# Patient Record
Sex: FEMALE | Race: WHITE | NOT HISPANIC OR LATINO | Employment: OTHER | URBAN - METROPOLITAN AREA
[De-identification: names, ages, dates, MRNs, and addresses within clinical notes are randomized per-mention and may not be internally consistent; named-entity substitution may affect disease eponyms.]

---

## 2019-12-09 ENCOUNTER — HOSPITAL ENCOUNTER (OUTPATIENT)
Dept: RADIOLOGY | Facility: OTHER | Age: 44
Discharge: HOME | End: 2019-12-09
Attending: OBSTETRICS & GYNECOLOGY
Payer: COMMERCIAL

## 2019-12-09 DIAGNOSIS — Z12.31 SCREENING MAMMOGRAM, ENCOUNTER FOR: ICD-10-CM

## 2019-12-16 ENCOUNTER — HOSPITAL ENCOUNTER (OUTPATIENT)
Dept: RADIOLOGY | Facility: OTHER | Age: 44
Discharge: HOME | End: 2019-12-16
Attending: OBSTETRICS & GYNECOLOGY
Payer: COMMERCIAL

## 2019-12-16 PROCEDURE — 77067 SCR MAMMO BI INCL CAD: CPT | Mod: TC

## 2019-12-16 PROCEDURE — 77067 SCR MAMMO BI INCL CAD: CPT | Mod: 26,,, | Performed by: RADIOLOGY

## 2019-12-16 PROCEDURE — 77063 MAMMO DIGITAL SCREENING BILAT WITH TOMOSYNTHESIS_CAD: ICD-10-PCS | Mod: 26,,, | Performed by: RADIOLOGY

## 2019-12-16 PROCEDURE — 77063 BREAST TOMOSYNTHESIS BI: CPT | Mod: 26,,, | Performed by: RADIOLOGY

## 2019-12-16 PROCEDURE — 77067 MAMMO DIGITAL SCREENING BILAT WITH TOMOSYNTHESIS_CAD: ICD-10-PCS | Mod: 26,,, | Performed by: RADIOLOGY

## 2020-05-22 ENCOUNTER — LAB VISIT (OUTPATIENT)
Dept: PRIMARY CARE CLINIC | Facility: CLINIC | Age: 45
End: 2020-05-22
Payer: COMMERCIAL

## 2020-05-22 DIAGNOSIS — R05.9 COUGH: Primary | ICD-10-CM

## 2020-05-22 PROCEDURE — U0003 INFECTIOUS AGENT DETECTION BY NUCLEIC ACID (DNA OR RNA); SEVERE ACUTE RESPIRATORY SYNDROME CORONAVIRUS 2 (SARS-COV-2) (CORONAVIRUS DISEASE [COVID-19]), AMPLIFIED PROBE TECHNIQUE, MAKING USE OF HIGH THROUGHPUT TECHNOLOGIES AS DESCRIBED BY CMS-2020-01-R: HCPCS

## 2020-05-24 LAB — SARS-COV-2 RNA RESP QL NAA+PROBE: NOT DETECTED

## 2020-12-24 ENCOUNTER — HOSPITAL ENCOUNTER (OUTPATIENT)
Dept: RADIOLOGY | Facility: OTHER | Age: 45
Discharge: HOME | End: 2020-12-24
Attending: OBSTETRICS & GYNECOLOGY
Payer: COMMERCIAL

## 2020-12-24 DIAGNOSIS — Z12.31 SCREENING MAMMOGRAM, ENCOUNTER FOR: ICD-10-CM

## 2020-12-24 PROCEDURE — 77067 SCR MAMMO BI INCL CAD: CPT | Mod: TC

## 2020-12-24 PROCEDURE — 77067 MAMMO DIGITAL SCREENING BILAT WITH TOMO: ICD-10-PCS | Mod: 26,,, | Performed by: RADIOLOGY

## 2020-12-24 PROCEDURE — 77063 MAMMO DIGITAL SCREENING BILAT WITH TOMO: ICD-10-PCS | Mod: 26,,, | Performed by: RADIOLOGY

## 2020-12-24 PROCEDURE — 77067 SCR MAMMO BI INCL CAD: CPT | Mod: 26,,, | Performed by: RADIOLOGY

## 2020-12-24 PROCEDURE — 77063 BREAST TOMOSYNTHESIS BI: CPT | Mod: 26,,, | Performed by: RADIOLOGY

## 2021-04-16 ENCOUNTER — PATIENT MESSAGE (OUTPATIENT)
Dept: RESEARCH | Facility: HOSPITAL | Age: 46
End: 2021-04-16

## 2021-12-30 ENCOUNTER — HOSPITAL ENCOUNTER (OUTPATIENT)
Dept: RADIOLOGY | Facility: OTHER | Age: 46
Discharge: HOME | End: 2021-12-30
Attending: OBSTETRICS & GYNECOLOGY
Payer: COMMERCIAL

## 2021-12-30 DIAGNOSIS — Z12.31 VISIT FOR SCREENING MAMMOGRAM: ICD-10-CM

## 2021-12-30 PROCEDURE — 77063 BREAST TOMOSYNTHESIS BI: CPT | Mod: 26,,, | Performed by: RADIOLOGY

## 2021-12-30 PROCEDURE — 77067 SCR MAMMO BI INCL CAD: CPT | Mod: TC

## 2021-12-30 PROCEDURE — 77067 MAMMO DIGITAL SCREENING BILAT WITH TOMO: ICD-10-PCS | Mod: 26,,, | Performed by: RADIOLOGY

## 2021-12-30 PROCEDURE — 77063 MAMMO DIGITAL SCREENING BILAT WITH TOMO: ICD-10-PCS | Mod: 26,,, | Performed by: RADIOLOGY

## 2021-12-30 PROCEDURE — 77067 SCR MAMMO BI INCL CAD: CPT | Mod: 26,,, | Performed by: RADIOLOGY

## 2022-01-04 ENCOUNTER — TELEPHONE (OUTPATIENT)
Dept: RADIOLOGY | Facility: OTHER | Age: 47
End: 2022-01-04
Payer: COMMERCIAL

## 2022-01-21 ENCOUNTER — HOSPITAL ENCOUNTER (OUTPATIENT)
Dept: RADIOLOGY | Facility: HOSPITAL | Age: 47
Discharge: HOME | End: 2022-01-21
Attending: OBSTETRICS & GYNECOLOGY
Payer: COMMERCIAL

## 2022-01-21 DIAGNOSIS — R92.8 ABNORMAL MAMMOGRAM: ICD-10-CM

## 2022-01-21 PROCEDURE — 77065 DX MAMMO INCL CAD UNI: CPT | Mod: TC,RT

## 2022-01-21 PROCEDURE — 76642 ULTRASOUND BREAST LIMITED: CPT | Mod: 26,RT,, | Performed by: RADIOLOGY

## 2022-01-21 PROCEDURE — 77061 BREAST TOMOSYNTHESIS UNI: CPT | Mod: 26,RT,, | Performed by: RADIOLOGY

## 2022-01-21 PROCEDURE — 77065 MAMMO DIGITAL DIAGNOSTIC RIGHT WITH TOMO: ICD-10-PCS | Mod: 26,RT,, | Performed by: RADIOLOGY

## 2022-01-21 PROCEDURE — 77065 DX MAMMO INCL CAD UNI: CPT | Mod: 26,RT,, | Performed by: RADIOLOGY

## 2022-01-21 PROCEDURE — 76642 US BREAST RIGHT LIMITED: ICD-10-PCS | Mod: 26,RT,, | Performed by: RADIOLOGY

## 2022-01-21 PROCEDURE — 77061 MAMMO DIGITAL DIAGNOSTIC RIGHT WITH TOMO: ICD-10-PCS | Mod: 26,RT,, | Performed by: RADIOLOGY

## 2022-01-21 PROCEDURE — 76642 ULTRASOUND BREAST LIMITED: CPT | Mod: TC,RT

## 2022-01-24 ENCOUNTER — TELEPHONE (OUTPATIENT)
Dept: RADIOLOGY | Facility: OTHER | Age: 47
End: 2022-01-24
Payer: COMMERCIAL

## 2022-01-24 NOTE — TELEPHONE ENCOUNTER
Spoke with patient regarding follow up mammogram and breast ultrasound. Verbalized understanding. All questions answered.

## 2023-02-03 ENCOUNTER — HOSPITAL ENCOUNTER (OUTPATIENT)
Dept: RADIOLOGY | Facility: HOSPITAL | Age: 48
Discharge: HOME | End: 2023-02-03
Attending: OBSTETRICS & GYNECOLOGY
Payer: COMMERCIAL

## 2023-02-03 VITALS — WEIGHT: 128 LBS | BODY MASS INDEX: 22.67 KG/M2

## 2023-02-03 DIAGNOSIS — Z12.31 VISIT FOR SCREENING MAMMOGRAM: ICD-10-CM

## 2023-02-03 PROCEDURE — 77067 SCR MAMMO BI INCL CAD: CPT | Mod: 26,,, | Performed by: RADIOLOGY

## 2023-02-03 PROCEDURE — 77063 MAMMO DIGITAL SCREENING BILAT WITH TOMO: ICD-10-PCS | Mod: 26,,, | Performed by: RADIOLOGY

## 2023-02-03 PROCEDURE — 77063 BREAST TOMOSYNTHESIS BI: CPT | Mod: 26,,, | Performed by: RADIOLOGY

## 2023-02-03 PROCEDURE — 77067 SCR MAMMO BI INCL CAD: CPT | Mod: TC

## 2023-02-03 PROCEDURE — 77067 MAMMO DIGITAL SCREENING BILAT WITH TOMO: ICD-10-PCS | Mod: 26,,, | Performed by: RADIOLOGY

## 2024-02-12 ENCOUNTER — HOSPITAL ENCOUNTER (OUTPATIENT)
Dept: RADIOLOGY | Facility: OTHER | Age: 49
Discharge: HOME | End: 2024-02-12
Attending: OBSTETRICS & GYNECOLOGY
Payer: COMMERCIAL

## 2024-02-12 VITALS — WEIGHT: 128.06 LBS | BODY MASS INDEX: 22.69 KG/M2 | HEIGHT: 63 IN

## 2024-02-12 DIAGNOSIS — Z12.31 ENCOUNTER FOR SCREENING MAMMOGRAM FOR MALIGNANT NEOPLASM OF BREAST: ICD-10-CM

## 2024-02-12 PROCEDURE — 77067 SCR MAMMO BI INCL CAD: CPT | Mod: TC

## 2024-02-12 PROCEDURE — 77063 BREAST TOMOSYNTHESIS BI: CPT | Mod: 26,,, | Performed by: RADIOLOGY

## 2024-02-12 PROCEDURE — 77067 SCR MAMMO BI INCL CAD: CPT | Mod: 26,,, | Performed by: RADIOLOGY

## 2024-06-07 ENCOUNTER — HOSPITAL ENCOUNTER (OUTPATIENT)
Dept: RADIOLOGY | Facility: HOSPITAL | Age: 49
Discharge: HOME | End: 2024-06-07
Attending: ORTHOPAEDIC SURGERY
Payer: COMMERCIAL

## 2024-06-07 ENCOUNTER — OFFICE VISIT (OUTPATIENT)
Dept: SPORTS MEDICINE | Facility: CLINIC | Age: 49
End: 2024-06-07
Payer: COMMERCIAL

## 2024-06-07 VITALS
HEIGHT: 63 IN | HEART RATE: 69 BPM | WEIGHT: 125.88 LBS | BODY MASS INDEX: 22.3 KG/M2 | DIASTOLIC BLOOD PRESSURE: 62 MMHG | SYSTOLIC BLOOD PRESSURE: 99 MMHG

## 2024-06-07 DIAGNOSIS — M25.511 RIGHT SHOULDER PAIN, UNSPECIFIED CHRONICITY: Primary | ICD-10-CM

## 2024-06-07 DIAGNOSIS — M25.511 RIGHT SHOULDER PAIN, UNSPECIFIED CHRONICITY: ICD-10-CM

## 2024-06-07 PROCEDURE — 3078F DIAST BP <80 MM HG: CPT | Mod: CPTII,S$GLB,, | Performed by: ORTHOPAEDIC SURGERY

## 2024-06-07 PROCEDURE — 99999 PR PBB SHADOW E&M-EST. PATIENT-LVL IV: CPT | Mod: PBBFAC,,, | Performed by: ORTHOPAEDIC SURGERY

## 2024-06-07 PROCEDURE — 73030 X-RAY EXAM OF SHOULDER: CPT | Mod: 26,RT,, | Performed by: RADIOLOGY

## 2024-06-07 PROCEDURE — 3074F SYST BP LT 130 MM HG: CPT | Mod: CPTII,S$GLB,, | Performed by: ORTHOPAEDIC SURGERY

## 2024-06-07 PROCEDURE — 1159F MED LIST DOCD IN RCRD: CPT | Mod: CPTII,S$GLB,, | Performed by: ORTHOPAEDIC SURGERY

## 2024-06-07 PROCEDURE — 73030 X-RAY EXAM OF SHOULDER: CPT | Mod: TC,RT

## 2024-06-07 PROCEDURE — 3008F BODY MASS INDEX DOCD: CPT | Mod: CPTII,S$GLB,, | Performed by: ORTHOPAEDIC SURGERY

## 2024-06-07 PROCEDURE — 99204 OFFICE O/P NEW MOD 45 MIN: CPT | Mod: S$GLB,,, | Performed by: ORTHOPAEDIC SURGERY

## 2024-06-07 NOTE — PROGRESS NOTES
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 Female RHD reports that the pain is severe and not responding to any conservative care.      Pain has been present since July 2023 from working out and work   On 9/15/23 patient saw Dr. Ash and had a cortisone injection, with no relief, and attended formal PT at [facility]   10/29/23 she had a fall in the parking lot and was barely able to lift her arm  She followed up with Dr. Ash and was told to continue PT  Her therapist recommended Dr. Hsu after she continued to have pain although her motion and strength improved   Surgery was recommended but she had a patient recommend Dr. Roman for a second opinion as she does not want to have to miss much work     She reports that the pain is worse with overhead activity and internal rotation activity. It also bothers her at night.    Is affecting ADLs.     She has also used ice and heat   Total time spent in formal PT was 6 months     Review of Systems   Constitution: Negative. Negative for chills, fever and night sweats.   HENT: Negative for congestion and headaches.    Eyes: Negative for blurred vision, left vision loss and right vision loss.   Cardiovascular: Negative for chest pain and syncope.   Respiratory: Negative for cough and shortness of breath.    Endocrine: Negative for polydipsia, polyphagia and polyuria.   Hematologic/Lymphatic: Negative for bleeding problem. Does not bruise/bleed easily.   Skin: Negative for dry skin, itching and rash.   Musculoskeletal: Negative for falls and muscle weakness.   Gastrointestinal: Negative for abdominal pain and bowel incontinence.   Genitourinary: Negative for bladder incontinence and nocturia.   Neurological: Negative for disturbances in coordination, loss of balance and seizures.   Psychiatric/Behavioral: Negative for depression. The patient does not have insomnia.    Allergic/Immunologic: Negative for hives and persistent  infections.     PAST MEDICAL HISTORY:   Past Medical History:   Diagnosis Date    Hypothyroidism     Thyroid nodule      PAST SURGICAL HISTORY: History reviewed. No pertinent surgical history.  FAMILY HISTORY:   Family History   Problem Relation Name Age of Onset    Breast cancer Paternal Grandmother      Colon cancer Neg Hx      Ovarian cancer Neg Hx       SOCIAL HISTORY:   Social History     Socioeconomic History    Marital status: Single   Tobacco Use    Smoking status: Never    Smokeless tobacco: Never     Social Determinants of Health     Financial Resource Strain: Low Risk  (6/6/2024)    Overall Financial Resource Strain (CARDIA)     Difficulty of Paying Living Expenses: Not hard at all   Food Insecurity: No Food Insecurity (6/6/2024)    Hunger Vital Sign     Worried About Running Out of Food in the Last Year: Never true     Ran Out of Food in the Last Year: Never true   Transportation Needs: No Transportation Needs (11/16/2023)    Received [facility]    [facility]     Lack of Transportation (Medical): No     Lack of Transportation (Non-Medical): No   Physical Activity: Insufficiently Active (6/6/2024)    Exercise Vital Sign     Days of Exercise per Week: 4 days     Minutes of Exercise per Session: 30 min   Stress: No Stress Concern Present (6/6/2024)    [employer] - Occupational Stress Questionnaire     Feeling of Stress : Only a little   Housing Stability: Unknown (6/6/2024)    Housing Stability Vital Sign     Unable to Pay for Housing in the Last Year: No       MEDICATIONS:   Current Outpatient Medications:     hydroxychloroquine (PLAQUENIL) 200 mg tablet, , Disp: , Rfl: 0    hydrOXYzine pamoate (VISTARIL) 25 MG Cap, take 1 capsule by mouth four times a day, Disp: , Rfl: 0    levocetirizine (XYZAL) 5 MG tablet, , Disp: , Rfl: 0    levothyroxine (SYNTHROID) 75 MCG tablet, Take 75 mcg by mouth once daily., Disp: , Rfl: 0     "norethindrone-e.estradiol-iron (JUNEL FE 24) 1 mg-20 mcg (24)/75 mg (4) Oral per tablet, Take 1 tablet by mouth once daily., Disp: 28 tablet, Rfl: 12    prednisoLONE acetate (PRED FORTE) 1 % DrpS, , Disp: , Rfl: 0    triamcinolone acetonide 0.1% (KENALOG) 0.1 % cream, Apply topically 2 (two) times daily., Disp: 45 g, Rfl: 2    valACYclovir (VALTREX) 500 MG tablet, Take 1 tablet (500 mg total) by mouth once daily. for 30 doses, Disp: 30 tablet, Rfl: 11  ALLERGIES: Review of patient's allergies indicates:  No Known Allergies    VITAL SIGNS: BP 99/62   Pulse 69   Ht 5' 3" (1.6 m)   Wt 57.1 kg (125 lb 14.1 oz)   LMP 11/28/2019   BMI 22.30 kg/m²      PHYSICAL EXAMINATION:  General:  The patient is alert and oriented x 3.  Mood is pleasant.  Observation of ears, eyes and nose reveal no gross abnormalities.  No labored breathing observed.  Gait is coordinated. Patient can toe walk and heel walk without difficulty.      right SHOULDER / UPPER EXTREMITY EXAM    OBSERVATION:     Swelling  none  Deformity  none   Discoloration  none   Scapular winging none   Scars   none  Atrophy  none    TENDERNESS / CREPITUS (T/C):          T/C      T/C   Clavicle   -/-  SUPRAspinatus    -/-     AC Jt.    -/-  INFRAspinatus  -/-    SC Jt.    -/-  Deltoid    -/-      G. Tuberosity  -/-  LH BICEP groove  +/-   Acromion:  -/-  Midline Neck   -/-     Scapular Spine -/-  Trapezium   -/-   SMA Scapula  -/-  GH jt. line - post  -/-     Scapulothoracic  -/-         ROM: (* = with pain)  Right shoulder   Left shoulder        AROM (PROM)   AROM (PROM)   FE    170° (175°)     170° (175°)     ER at 0°    50°  (55°)    60°  (65°)   ER at 90° ABD  90°  (90°)    90°  (90°)   IR at 90°  ABD   NA  (40°)     NA  (40°)      IR (spine level)   T11    T10    STRENGTH: (* = with pain) RIGHT SHOULDER  LEFT SHOULDER   SCAPTION at 0  5-/5    5/5   SCAPTION at " 30  5/5    5/5    IR    5/5    5/5   ER    5/5    5/5   BICEPS   5/5    5/5   Deltoid    5/5    5/5     SIGNS:  Painful side       NEER   +    OGILS  +    MEDINA   +    SPEEDS  +     DROP ARM   neg   BELLY PRESS neg   Superior escape none    LIFT-OFF  neg   X-Body ADD    neg    MOVING VALGUS neg        STABILITY TESTING    RIGHT SHOULDER   LEFT SHOULDER     Translation     Anterior  up face     up face    Posterior  up face    up face    Sulcus   < 10mm    < 10 mm     Signs   Apprehension   neg      neg       Relocation   no change     no change      Jerk test  neg     neg    EXTREMITY NEURO-VASCULAR EXAM    Sensation grossly intact to light touch all dermatomal regions.    DTR 2+ Biceps, Triceps, BR and Negative Kwames sign   Grossly intact motor function at Elbow, Wrist and Hand   Distal pulses radial and ulnar 2+, brisk cap refill, symmetric.      NECK:  Painless FROM and spinous processes non-tender. Negative Spurlings sign.      OTHER FINDINGS:  + scapular dyskinesia    XRAYS reviewed and interpreted personally by me:  Shoulder trauma series right,  were obtained and reviewed  Mild DJD. No fracture or dislocation. No bone destruction identified. Linear calcified density noted adjacent to the humeral head consistent with calcified tendinopathy       MRI Right shoulder reviewed and interpreted personally by me:     Supraspinatus tendinosis with acute focal full-thickness tear anterior/mid fibers with tendon retraction. Infraspinatus tendinosis with acute partial-thickness partial with low grade articular surface/bursal tear/microtear  Superior labral tear at the undersurface of the bicipital labral complex  Patchy bone marrow contusion in the proximal humerus may be sequels of prior fall with or without underlying changes or red marrow reconversion      ASSESSMENT:    Right Shoulder Rotator Cuff Tear, SLAP, biceps tendonitis.      she would benefit from an arthroscopy, given the above.       PLAN:    Right Shoulder rotator cuff tear     All questions were answered, pt will contact us for questions or concerns in the interim.  Had thorough discussion of non-operative vs operative intervention, and risks and benefits of both.     We have discussed the surgery and recovery of arthroscopic shoulder surgery. she understands that there may be limited mobility up to several weeks after surgery depending on procedures that are performed at the time of surgery.    The spectrum of treatment options were discussed with the patient, including nonoperative and operative options.  After thorough discussion, the patient has elected to undergo surgical treatment to include:    right   a. Shoulder arthroscopic calcific tendonitis debridement and rotator cuff debridement versus repair with Cuffmend   b. Shoulder arthroscopic SAD   c. Shoulder arthroscopic extensive debridement   d. Shoulder arthroscopic biceps tenodesis    e. Shoulder arthroscopic possible microfracture   f.  Shoulder open reduction internal fixation greater tuberosity    The details of the surgical procedure were explained, including the location of probable incisions and a description of likely hardware and/or grafts to be used.  The patient understands the likely convalescence after surgery.  Also, we have thoroughly discussed the risks, benefits and alternatives to surgery, including, but not limited to, the risk of infection, joint stiffness, blood clot (including DVT and/or pulmonary embolus), neurologic and vascular injury.  It was explained that, if tissue has been repaired or reconstructed, there is a chance of failure, which may require further management.  Consent form for surgery is signed and in chart.     I made the decision to obtain old records of the patient including previous notes and imaging. New imaging was ordered today of the extremity or extremities evaluated. I independently reviewed and interpreted the radiographs and/or MRIs today as  well as prior imaging.    Patient will likely be out of work for 1 week and return performing desk duties only, she will be limited in what she can do for the first 2-3 weeks back

## 2024-06-20 ENCOUNTER — TELEPHONE (OUTPATIENT)
Dept: SPORTS MEDICINE | Facility: CLINIC | Age: 49
End: 2024-06-20

## 2024-06-20 ENCOUNTER — OFFICE VISIT (OUTPATIENT)
Dept: SPORTS MEDICINE | Facility: CLINIC | Age: 49
End: 2024-06-20
Payer: COMMERCIAL

## 2024-06-20 VITALS — WEIGHT: 129.63 LBS | BODY MASS INDEX: 22.97 KG/M2 | HEIGHT: 63 IN

## 2024-06-20 DIAGNOSIS — M75.121 NONTRAUMATIC COMPLETE TEAR OF RIGHT ROTATOR CUFF: ICD-10-CM

## 2024-06-20 DIAGNOSIS — M25.511 RIGHT SHOULDER PAIN, UNSPECIFIED CHRONICITY: Primary | ICD-10-CM

## 2024-06-20 DIAGNOSIS — M75.21 BICEPS TENDINITIS OF RIGHT SHOULDER: ICD-10-CM

## 2024-06-20 PROCEDURE — 99999 PR PBB SHADOW E&M-EST. PATIENT-LVL II: CPT | Mod: PBBFAC,,, | Performed by: ORTHOPAEDIC SURGERY

## 2024-06-20 PROCEDURE — 99214 OFFICE O/P EST MOD 30 MIN: CPT | Mod: S$GLB,,, | Performed by: ORTHOPAEDIC SURGERY

## 2024-06-20 PROCEDURE — 3008F BODY MASS INDEX DOCD: CPT | Mod: CPTII,S$GLB,, | Performed by: ORTHOPAEDIC SURGERY

## 2024-06-20 NOTE — PROGRESS NOTES
CC: RIGHT shoulder pain     48 y.o. Female with a 1 year history of right shoulder atraumatic pain. Patient works as an orthodontist.     She states that the pain is severe and not responding to any conservative care.      She reports that the pain and weakness is worse with overhead activity. It also bothers her at night.    Is affecting ADLs.      She presents for evaluation of her right shoulder.  Last summer, her right shoulder started bothering her while working out.  It initially got better, however in October she suffered a ground level fall while jogging and landed onto right arm which exacerbated her pain.  She is done physical therapy for 7 months without any relief.  She had 1 corticosteroid injection into the right shoulder which did not help her.  Her pain wakes her up nightly from her sleep.  Her pain bothers when she is working      Past Medical History:   Diagnosis Date    Hypothyroidism     Thyroid nodule        History reviewed. No pertinent surgical history.    Family History   Problem Relation Name Age of Onset    Breast cancer Paternal Grandmother      Colon cancer Neg Hx      Ovarian cancer Neg Hx           Current Outpatient Medications:     hydroxychloroquine (PLAQUENIL) 200 mg tablet, , Disp: , Rfl: 0    hydrOXYzine pamoate (VISTARIL) 25 MG Cap, take 1 capsule by mouth four times a day, Disp: , Rfl: 0    levocetirizine (XYZAL) 5 MG tablet, , Disp: , Rfl: 0    levothyroxine (SYNTHROID) 75 MCG tablet, Take 75 mcg by mouth once daily., Disp: , Rfl: 0    norethindrone-e.estradiol-iron (JUNEL FE 24) 1 mg-20 mcg (24)/75 mg (4) Oral per tablet, Take 1 tablet by mouth once daily., Disp: 28 tablet, Rfl: 12    prednisoLONE acetate (PRED FORTE) 1 % DrpS, , Disp: , Rfl: 0    triamcinolone acetonide 0.1% (KENALOG) 0.1 % cream, Apply topically 2 (two) times daily., Disp: 45 g, Rfl: 2    valACYclovir (VALTREX) 500 MG tablet, Take 1 tablet (500 mg total) by mouth once daily. for 30 doses, Disp: 30 tablet,  "Rfl: 11    Review of patient's allergies indicates:  No Known Allergies       REVIEW OF SYSTEMS:  Constitution: Negative. Negative for chills, fever and night sweats.   HENT: Negative for congestion and headaches.    Eyes: Negative for blurred vision, left vision loss and right vision loss.   Cardiovascular: Negative for chest pain and syncope.   Respiratory: Negative for cough and shortness of breath.    Endocrine: Negative for polydipsia, polyphagia and polyuria.   Hematologic/Lymphatic: Negative for bleeding problem. Does not bruise/bleed easily.   Skin: Negative for dry skin, itching and rash.   Musculoskeletal: Negative for falls.  Positive for right shoulder pain and muscle weakness.   Gastrointestinal: Negative for abdominal pain and bowel incontinence.   Genitourinary: Negative for bladder incontinence and nocturia.   Neurological: Negative for disturbances in coordination, loss of balance and seizures.   Psychiatric/Behavioral: Negative for depression. The patient does not have insomnia.    Allergic/Immunologic: Negative for hives and persistent infections.      PHYSICAL EXAMINATION:  Vitals:  Ht 5' 3" (1.6 m)   Wt 58.8 kg (129 lb 10.1 oz)   LMP 11/28/2019   BMI 22.96 kg/m²    General: The patient is alert and oriented x 3.  Mood is pleasant.  Observation of ears, eyes and nose reveal no gross abnormalities.  No labored breathing observed.  Gait is coordinated. Patient can toe walk and heel walk without difficulty.      RIGHT SHOULDER / UPPER EXTREMITY EXAM    OBSERVATION:     Swelling  none  Deformity  none   Discoloration  none   Scapular winging none   Scars   none  Atrophy  none    TENDERNESS / CREPITUS (T/C):          T/C      T/C   Clavicle   -/-  SUPRAspinatus    -/-     AC Jt.    -/-  INFRAspinatus  -/-    SC Jt.    -/-  Deltoid    -/-      G. Tuberosity  -/-  LH BICEP groove  +/-   Acromion:  -/-  Midline Neck   -/-     Scapular Spine -/-  Trapezium   -/-   SMA Scapula  -/-  GH jt. line - " post  -/-     Scapulothoracic  -/-         ROM: (* = with pain)  Left shoulder   Right shoulder        AROM (PROM)   AROM (PROM)   FE    170° (175°)     170° (175°)     ER at 0°    60°  (65°)    60°  (65°)   ER at 90° ABD  90°  (90°)    90°  (90°)   IR at 90°  ABD   NA  (40°)     NA  (40°)      IR (spine level)   T10     T10    STRENGTH: (* = with pain) Left shoulder   Right shoulder   SCAPTION   5/5    5/5 *    IR    5/5    5/5   ER    5/5    5/5   BICEPS   5/5    5/5   Deltoid    5/5    5/5     SIGNS:  Painful side       NEER   +    OGILS  neg    MEDINA   +    SPEEDS  neg     DROP ARM   -   BELLY PRESS neg   Superior escape none    LIFT-OFF  neg   X-Body ADD    neg    MOVING VALGUS neg        STABILITY TESTING    Left shoulder   Right shoulder    Translation     Anterior  up face     up face    Posterior  up face    up face    Sulcus   < 10mm    < 10 mm     Signs   Apprehension   neg      neg       Relocation   no change     no change      Jerk test  neg     neg    EXTREMITY NEURO-VASCULAR EXAM:    Sensation grossly intact to light touch all dermatomal regions.    DTR 2+ Biceps, Triceps, BR and Negative Kwames sign   Grossly intact motor function at Elbow, Wrist and Hand   Distal pulses radial and ulnar 2+, brisk cap refill, symmetric.      NECK:  Painless FROM and spinous processes non-tender. Negative Spurlings sign.        XRAYS:  Xrays including AP, Outlet and Axillary Lateral of shoulder are ordered / images reviewed by me:   No fracture dislocation or other pathology   Acromion type 2   Proximal migration of humeral head: None   GH arthritis: None    MRI R shoulder reviewed by me: near full thickness tear of the supraspinatus, biceps tendinitis, SLAP           ASSESSMENT:   Right shoulder pain:  1. Right shoulder pain, unspecified chronicity    2. Nontraumatic complete tear of right rotator cuff    3. Biceps tendinitis of right shoulder        PLAN:    Treatment options were discussed with the  patient about shoulder.  I reviewed the MRI images with her and what this means for her shoulder.    We discussed both non-operative and operative options for her shoulder and the risks and benefits of each. Time was given for questions to be asked and all concerns were answered.    She would like to go forward with surgery for her shoulder which I think is reasonable.  All specific risks and benefits were reviewed.    These risks include but are not limited to: bleeding, infection, scarring, re-tear of repair, irreparability of the tear, damage to neurovascular structures, damage to cartilage, stiffness, blood clots, pulmonary embolism, swelling, compartment syndrome, need for further surgery, and the risks of anesthesia.      She verbalized her understanding of these risks and wished to proceed with surgery.    Time was spent face-to-face with the patient during this encounter on counseling about treatment options including surgery and coordination of her care for preoperative visits, surgery and post-operative rehab.     The operative plan will be:    right   1. Arthroscopic rotator cuff repair  2. Arthroscopic subacromial decompression  3. Arthroscopic distal clavicle excision  4. Possible open biceps subpectoral tenodesis    Patient will not  need medical clearance prior to the pre-operative appointment.    All questions were answered, patient will contact us for questions or concerns in the interim.

## 2024-06-20 NOTE — TELEPHONE ENCOUNTER
I spoke with the patient after I noticed she had a third opinion with Dr. Paredes for there shoulder. I wanted to confirm if she would still have surgery with Dr. Roman and she informed me that she will be having surgery with Dr. Paredes. I let her know I would cancel everything for Dr. Roman

## 2024-06-21 ENCOUNTER — TELEPHONE (OUTPATIENT)
Dept: SPORTS MEDICINE | Facility: CLINIC | Age: 49
End: 2024-06-21
Payer: COMMERCIAL

## 2024-06-21 DIAGNOSIS — M75.101 TEAR OF RIGHT ROTATOR CUFF, UNSPECIFIED TEAR EXTENT, UNSPECIFIED WHETHER TRAUMATIC: Primary | ICD-10-CM

## 2024-06-21 DIAGNOSIS — M19.011 ARTHRITIS OF RIGHT ACROMIOCLAVICULAR JOINT: ICD-10-CM

## 2024-06-21 DIAGNOSIS — M75.21 BICEPS TENDINITIS OF RIGHT UPPER EXTREMITY: ICD-10-CM

## 2024-06-21 NOTE — TELEPHONE ENCOUNTER
----- Message from Tereza Grant MA sent at 6/21/2024  9:33 AM CDT -----  Good morning,     Surgery is booked     Sx 8/28/24     PT location is [facility]

## 2024-07-03 ENCOUNTER — TELEPHONE (OUTPATIENT)
Dept: SPORTS MEDICINE | Facility: CLINIC | Age: 49
End: 2024-07-03
Payer: COMMERCIAL

## 2024-07-03 NOTE — TELEPHONE ENCOUNTER
Spoke to patient  Faxed clearance form to 's office  Gave her number to central pricing  Gave CPT code  60992, [id number]  Patient voiced understanding----- Message from Tati Gomes sent at 7/3/2024  4:33 PM CDT -----  Pt calling in regards to her needing a form with the exact details needed for her clearance      Fax [phone]   call back [phone]     Family 's       Confirmed patient's contact info below:  Contact Name: Skye Marte  Phone Number: [phone]

## 2024-07-24 ENCOUNTER — TELEPHONE (OUTPATIENT)
Dept: SPORTS MEDICINE | Facility: CLINIC | Age: 49
End: 2024-07-24
Payer: COMMERCIAL

## 2024-07-24 NOTE — TELEPHONE ENCOUNTER
NO ANSWER, VM FULL  Called to find out if she has been cleared for surgery by her primary care physician.

## 2024-08-15 ENCOUNTER — TELEPHONE (OUTPATIENT)
Dept: SPORTS MEDICINE | Facility: CLINIC | Age: 49
End: 2024-08-15
Payer: COMMERCIAL

## 2024-08-15 NOTE — TELEPHONE ENCOUNTER
Called patient to discuss authorization. That we are okay to proceed without code.       ----- Message from Juan Jose Barragan sent at 8/14/2024 12:13 PM CDT -----  Regarding: FW: Pt Advice  Contact: pt [phone]    ----- Message -----  From: Steven Mirza  Sent: 8/14/2024  12:13 PM CDT  To: Reggie CALDERA Staff  Subject: Pt Advice                                        Pt is requesting call back to discuss plan of care for surgery, pt received denial for part of the surgery, please call pt @[phone]    Cpt [phone] denied

## 2024-08-22 ENCOUNTER — PATIENT MESSAGE (OUTPATIENT)
Dept: PREADMISSION TESTING | Facility: HOSPITAL | Age: 49
End: 2024-08-22
Payer: COMMERCIAL

## 2024-08-22 RX ORDER — OMEPRAZOLE 40 MG/1
40 CAPSULE, DELAYED RELEASE ORAL EVERY MORNING
COMMUNITY

## 2024-08-22 RX ORDER — ESTRADIOL 1 MG/1
1 TABLET ORAL DAILY
COMMUNITY

## 2024-08-22 RX ORDER — CHOLECALCIFEROL (VITAMIN D3) 25 MCG
1000 TABLET ORAL DAILY
COMMUNITY

## 2024-08-22 NOTE — ANESTHESIA PAT ROS NOTE
08/22/2024  Skye Marte is a 49 y.o., female.      Pre-op Assessment    I have reviewed the Patient Summary Reports.       I have reviewed the Medications.     Review of Systems  Anesthesia Hx:  No previous Anesthesia   Neg history of prior surgery.             Social:  Non-Smoker, Social Alcohol Use       Hematology/Oncology:  Hematology Normal   Oncology Normal                                   EENT/Dental:   Thyroid nodule          Cardiovascular:  Exercise tolerance: good       Denies CAD.       Denies Angina.     hyperlipidemia  Denies MORENO.  ECG has been reviewed.                          Pulmonary:  Pulmonary Normal   Denies COPD.  Denies Asthma.   Denies Shortness of breath.                  Renal/:  Renal/ Normal                 Hepatic/GI:  Hepatic/GI Normal     Denies GERD. Denies Liver Disease.            Musculoskeletal:  Arthritis   Tear of right rotator cuff,   Arthritis of right acromioclavicular joint,  Biceps tendinitis of right upper extremity              Neurological:  Neurology Normal   Denies CVA.    Denies Headaches. Denies Seizures.                                Endocrine:   Hypothyroidism  Prediabetes, HA1C = 5.8 on 8/2/2024        Psych:  Psychiatric Normal                  Past Medical History:   Diagnosis Date    Hypothyroidism     Thyroid nodule      No past surgical history on file.       Anesthesia Assessment: Preoperative EQUATION    Planned Procedure: Procedure(s) (LRB):  DEBRIDEMENT, ROTATOR CUFF, ARTHROSCOPIC (Right)  ARTHROSCOPY, SHOULDER, WITH DISTAL CLAVICLE EXCISION (Right)  TENODESIS, BICEPS, OPEN (Right)  Requested Anesthesia Type:General  Surgeon: Isidro Paredes MD  Service: Orthopedics  Known or anticipated Date of Surgery:8/28/2024    Surgeon notes: reviewed    Electronic QUestionnaire Assessment completed via nurse interview with patient.     "    Triage considerations:     The patient has no apparent active cardiac condition (No unstable coronary Syndrome such as severe unstable angina or recent [<1 month] myocardial infarction, decompensated CHF, severe valvular   disease or significant arrhythmia)    Previous anesthesia records:None    Last PCP note: within 1 month , outside [facility]   Subspecialty notes: n/a    Other important co-morbidities: HLD and Hypothyroid       Outside EKG 8/2/2024: (scanned into media with clearance)  EKG 12 Lead -> sinus bradycardia, no ischemic change       Tests already available:  Results have been reviewed.             Instructions given. (See in Nurse's note)  Preop medication instructions sent via portal message.     Optimization:  Anesthesia Preop Clinic Assessment Not Indicated    Medical Opinion Indicated: Yes       Sub-specialist consult indicated:  No      Plan:  Consultation:Patient's PCP for a statement of optimization    Patient  has previously scheduled Medical Appointment: 8/2/2024    Navigation: Patient is cleared/ optimized for surgery by PCP.   "Patient is medically optimized for surgery under general anesthesia."      Ht: 5'3  Wt: 58.8 kg (129 lb)  BMI: 22.96  Vaccinated  "

## 2024-08-23 ENCOUNTER — OFFICE VISIT (OUTPATIENT)
Dept: SPORTS MEDICINE | Facility: CLINIC | Age: 49
End: 2024-08-23
Payer: COMMERCIAL

## 2024-08-23 VITALS
HEART RATE: 80 BPM | SYSTOLIC BLOOD PRESSURE: 106 MMHG | WEIGHT: 123.13 LBS | BODY MASS INDEX: 21.81 KG/M2 | DIASTOLIC BLOOD PRESSURE: 66 MMHG

## 2024-08-23 DIAGNOSIS — M75.121 NONTRAUMATIC COMPLETE TEAR OF RIGHT ROTATOR CUFF: Primary | ICD-10-CM

## 2024-08-23 PROCEDURE — 99999 PR PBB SHADOW E&M-EST. PATIENT-LVL IV: CPT | Mod: PBBFAC,,, | Performed by: PHYSICIAN ASSISTANT

## 2024-08-23 RX ORDER — OXYCODONE AND ACETAMINOPHEN 10; 325 MG/1; MG/1
1 TABLET ORAL EVERY 6 HOURS PRN
Qty: 28 TABLET | Refills: 0 | Status: SHIPPED | OUTPATIENT
Start: 2024-08-23

## 2024-08-23 RX ORDER — TRAMADOL HYDROCHLORIDE 50 MG/1
50 TABLET ORAL EVERY 6 HOURS PRN
Qty: 20 TABLET | Refills: 0 | Status: SHIPPED | OUTPATIENT
Start: 2024-08-23

## 2024-08-23 RX ORDER — NAPROXEN SODIUM 220 MG/1
81 TABLET, FILM COATED ORAL 2 TIMES DAILY
Qty: 28 TABLET | Refills: 0 | Status: SHIPPED | OUTPATIENT
Start: 2024-08-23 | End: 2024-09-06

## 2024-08-23 RX ORDER — PROMETHAZINE HYDROCHLORIDE 25 MG/1
25 TABLET ORAL EVERY 6 HOURS PRN
Qty: 20 TABLET | Refills: 0 | Status: SHIPPED | OUTPATIENT
Start: 2024-08-23

## 2024-08-23 NOTE — H&P (VIEW-ONLY)
Skye Marte  is here for a completion of her perioperative paperwork. she  Is scheduled to undergo:    right   1. Arthroscopic rotator cuff repair  2. Arthroscopic subacromial decompression  3. Arthroscopic distal clavicle excision  4. Possible open biceps subpectoral tenodesis     on 8/28/2024.      She is a healthy individual but does need clearance for this procedure.    Patient has been cleared to proceed with surgery.      PAST MEDICAL HISTORY:   Past Medical History:   Diagnosis Date    Hypothyroidism     Thyroid nodule      PAST SURGICAL HISTORY: History reviewed. No pertinent surgical history.  FAMILY HISTORY:   Family History   Problem Relation Name Age of Onset    Breast cancer Paternal Grandmother      Colon cancer Neg Hx      Ovarian cancer Neg Hx       SOCIAL HISTORY:   Social History     Socioeconomic History    Marital status: Single   Tobacco Use    Smoking status: Never    Smokeless tobacco: Never     Social Determinants of Health     Financial Resource Strain: Low Risk  (8/2/2024)    Received from [facility]    Overall Financial Resource Strain (CARDIA)     Difficulty of Paying Living Expenses: Not hard at all   Food Insecurity: No Food Insecurity (8/2/2024)    Received from [facility]    Hunger Vital Sign     Worried About Running Out of Food in the Last Year: Never true     Ran Out of Food in the Last Year: Never true   Transportation Needs: No Transportation Needs (8/2/2024)    Received from [facility]    PRAPARE - Transportation     Lack of Transportation (Medical): No     Lack of Transportation (Non-Medical): No   Physical Activity: Insufficiently Active (8/2/2024)    Received from [facility]    Exercise Vital Sign     Days of Exercise per Week: 4 days     Minutes of Exercise per Session: 30 min   Stress: No Stress Concern Present (8/2/2024)    Received from [facility]    [employer] - Occupational Stress Questionnaire     Feeling of Stress : Only a  alonzo   Housing Stability: Unknown (6/6/2024)    Housing Stability Vital Sign     Unable to Pay for Housing in the Last Year: No       MEDICATIONS:   Current Outpatient Medications:     calcium/folic ac/multivit-min (MULTIVITAMIN-MIN-CALCIUM-FA ORAL), Take 1 capsule by mouth once daily., Disp: , Rfl:     estradioL (ESTRACE) 1 MG tablet, Take 1 mg by mouth once daily., Disp: , Rfl:     hydroxychloroquine (PLAQUENIL) 200 mg tablet, , Disp: , Rfl: 0    hydrOXYzine pamoate (VISTARIL) 25 MG Cap, take 1 capsule by mouth four times a day, Disp: , Rfl: 0    levothyroxine (SYNTHROID) 75 MCG tablet, Take 75 mcg by mouth once daily., Disp: , Rfl: 0    omeprazole (PRILOSEC) 40 MG capsule, Take 40 mg by mouth every morning., Disp: , Rfl:     prednisoLONE acetate (PRED FORTE) 1 % DrpS, , Disp: , Rfl: 0    vitamin D (VITAMIN D3) 1000 units Tab, Take 1,000 Units by mouth once daily., Disp: , Rfl:     aspirin 81 MG Chew, Take 1 tablet (81 mg total) by mouth 2 (two) times a day. for 14 days, Disp: 28 tablet, Rfl: 0    BIFIDOBACTERIUM ANIMALIS ORAL, Take 1 tablet by mouth once daily., Disp: , Rfl:     levocetirizine (XYZAL) 5 MG tablet, Take 5 mg by mouth nightly as needed for Allergies. (Patient not taking: Reported on 8/23/2024), Disp: , Rfl: 0    oxyCODONE-acetaminophen (PERCOCET)  mg per tablet, Take 1 tablet by mouth every 6 (six) hours as needed for Pain., Disp: 28 tablet, Rfl: 0    promethazine (PHENERGAN) 25 MG tablet, Take 1 tablet (25 mg total) by mouth every 6 (six) hours as needed for Nausea., Disp: 20 tablet, Rfl: 0    traMADoL (ULTRAM) 50 mg tablet, Take 1 tablet (50 mg total) by mouth every 6 (six) hours as needed for Pain., Disp: 20 tablet, Rfl: 0    triamcinolone acetonide 0.1% (KENALOG) 0.1 % cream, Apply topically 2 (two) times daily., Disp: 45 g, Rfl: 2    turmeric-ging-olive-oreg-capry 100 mg-150 mg- 50 mg-150 mg Cap, Take 1 tablet by mouth once daily. (Patient not taking: Reported on 8/23/2024), Disp: ,  Rfl:     valACYclovir (VALTREX) 500 MG tablet, Take 1 tablet (500 mg total) by mouth once daily. for 30 doses, Disp: 30 tablet, Rfl: 11  ALLERGIES: Review of patient's allergies indicates:  No Known Allergies    VITAL SIGNS: /66   Pulse 80   Wt 55.8 kg (123 lb 2 oz)   LMP 11/28/2019   BMI 21.81 kg/m²      Risks, indications and benefits of the surgical procedure were discussed with the patient. All questions with regard to surgery, rehab, expected return to functional activities, activities of daily living and recreational endeavors were answered to her satisfaction.    It was explained to the patient that there may be an increase in surgical risks if the patient has certain co-morbidities such as but not limited to: Obesity, Cardiovascular issues (CHF, CAD, Arrhythmias), chronic pulmonary issues, previous or current neurovascular/neurological issues, previous strokes, diabetes mellitus, previous wound healing issues, previous wound or skin infections, PVD, clotting disorders, if the patient uses chronic steroids, if the patient takes or has immune compromising medications or diseases, or has previously or currently used tobacco products.     The patient verbalized that he/she does not have any additional clotting, bleeding, or blood disorders, other than what is list in her chart on today's review.     Then a brief history and physical exam were performed.    Review of Systems   Constitution: Negative. Negative for chills, fever and night sweats.   HENT: Negative for congestion and headaches.    Eyes: Negative for blurred vision, left vision loss and right vision loss.   Cardiovascular: Negative for chest pain and syncope.   Respiratory: Negative for cough and shortness of breath.    Endocrine: Negative for polydipsia, polyphagia and polyuria.   Hematologic/Lymphatic: Negative for bleeding problem. Does not bruise/bleed easily.   Skin: Negative for dry skin, itching and rash.   Musculoskeletal: Negative  for falls and muscle weakness.   Gastrointestinal: Negative for abdominal pain and bowel incontinence.   Genitourinary: Negative for bladder incontinence and nocturia.   Neurological: Negative for disturbances in coordination, loss of balance and seizures.   Psychiatric/Behavioral: Negative for depression. The patient does not have insomnia.    Allergic/Immunologic: Negative for hives and persistent infections.     PHYSICAL EXAM:  GEN: A&Ox3, WD WN NAD  HEENT: WNL  CHEST: CTAB, no W/R/R  HEART: RRR, no M/R/G  ABD: Soft, NT ND, BS x4 QUADS  MS; See Epic  NEURO: CN II-XII intact       The surgical consent was then reviewed with the patient, who agreed with all the contents of the consent form and it was signed. she was then given the [facility] surgery packet to bring with her to surgery for the anesthesia portion of her perioperative paperwork.   For all physicians except for Dr. Paredes, we will email and possibly fax the consent forms and booking sheets to [facility] pre-admit.    The patient was given the opportunity to ask questions about the surgical plan and consent form, and once no other questions were asked, I proceeded with the pre-op appointment.    PHYSICAL THERAPY:  She was also instructed regarding physical therapy and will begin on POD#3-5 at the [place] location.     POST OP CARE:instructions were reviewed including care of the wound and dressing after surgery and when she can shower.     CRUTCHES OR WALKER: It was explained to the patient that if they are having a lower extremity surgery that they will require either a walker or crutches to ambulate safely with after surgery. It was explained that a cane or other assistive devices are not sufficient to safely ambulate with after surgery. I explained to the patient that I will place an order for them to receive either crutches or a walker after surgery to go home with. It was explained that if they have crutches or a walker at  home already, that they are REQUIRED to bring them to the hospital on the day of surgery. It was explained that if they do not have them at the hospital on the day of surgery that they WILL be provided a new pair or crutches or a walker to go home with to ensure ambulation will be safe if the patient needs to stop somewhere on the way home.      PAIN MANAGEMENT: Skye Marte was also given their pain management regimen, which includes the TENS unit given to her by [facility] along with the education required for its use. She was also instructed regarding the Polar ice unit that will be in place after surgery and her postoperative pain medications.     PAIN MEDICATION:  Percocet 10/325mg 1 po q 4-6 hours prn pain  Ultram 50 mg Take 1-2 p.o. q.6 hours p.r.n. breakthrough pain,   Phenergan 25 mg one p.o. q.6 hours p.r.n. nausea and vomiting.    Post op meds to be delivered bedside prior to discharge. Deliver to family if patient is in surgery at 5pm.    The patient was told that narcotic pain medications may make them drowsy and instructions were given to not sign legal documents, drive or operate heavy machinery, cars, or equipment while under the influence of narcotic medications. The patient was told and understands that narcotic pain medications should only be used as needed to control pain and that other options of pain control include TENs unit and ice packs/unit.     Patient was instructed to purchase and take Colace to counter possible GI side effects of taking opiates.     DVT prophylaxis was discussed with the patient today including risk factors for developing DVTs and history of DVTs. The patient was asked if any specific recommendations were given from the doctor/s that did pre-operative surgical clearance. The patient was then given an education sheet about DVTs and PE with warning signs and symptoms of both and steps to take if they suspect either of these.    Patient was asked if they were  taking or using OCP pills or devices. If they answered yes, then they were instructed to stop using OCPs at this pre-operative appointment until 2 months post-op to help prevent DVT development. They understand that there are other forms of birth control that do not involve hormones. They expressed understanding that ignoring/not following this instruction could result in a DVT which could turn into a deadly pulmonary embolism.     This along with the Modified Caprini risk assessment model for VTE in general surgical patients was used to determine the patient's DVT risk.     From: Evelyn MK, Alfa DA, Amanda SM, et al. Prevention of VTE in nonorthopedic surgical patients: antithrombotic therapy and prevention of thrombosis, 9th ed: American College of Chest Physicians evidence-based clinical practical guidelines. Chest 2012; 141:e227S. Copyright © 2012. Reproduced with permission from the American College of Chest Physicians.    The below listed DVT prophylaxis regimen was discussed along with SCDs during surgery and bilateral DAISY compression stockings to be used post-op. Length of treatment has been determined to be 10-42 days post-op by the above noted Caprini assessment model. Early ambulation post-op was also discussed and emphasized with the patient.     The patient was instructed to buy and take:  Aspirin 81mg BID x 2 weeks for DVT prophylaxis starting on the morning after surgery.  Patient will also use bilateral TEDs on lower extremities, SCDs during surgery, and early ambulation post-op. If the patient was previously taking 81mg baby aspirin, they were told to not take it starting 5 days prior to surgery and to restart the 81mg aspirin after surgery.       Patient was also told to buy over the counter Prilosec medication if needed and take it once daily for GI protection as long as they are taking NSAIDs or Aspirin.    Patient denies history of seizures.     I explained to following and the patient expressed  understanding:  The patient is currently aware of the COVID19 pandemic and that proceeding with their surgical procedure could potentially increase exposure to coronavirus in the community. The patient understands that there is the possibility of delayed or cancelled appts or PT visits in the future. They understand that infection with the coronavirus could complicate their surgery recovery. They are aware of the current policies and procedures of [facility] and the government regarding the pandemic and they were given the option of delaying my surgery. The patient elects to proceed with surgery at this time.     The patient was instructed to practice strict social distancing, hand washing/hygiene, respiratory hygiene, and cough etiquette from now until 6 weeks following surgery to reduce the risk of cruz coronavirus.    As there were no other questions to be asked, she was given my business card along with Isidro Paredes MD business card if she has any questions or concerns prior to surgery or in the postop period.

## 2024-08-23 NOTE — H&P
Skye Marte  is here for a completion of her perioperative paperwork. she  Is scheduled to undergo:    right   1. Arthroscopic rotator cuff repair  2. Arthroscopic subacromial decompression  3. Arthroscopic distal clavicle excision  4. Possible open biceps subpectoral tenodesis     on 8/28/2024.      She is a healthy individual but does need clearance for this procedure.    Patient has been cleared to proceed with surgery.      PAST MEDICAL HISTORY:   Past Medical History:   Diagnosis Date    Hypothyroidism     Thyroid nodule      PAST SURGICAL HISTORY: History reviewed. No pertinent surgical history.  FAMILY HISTORY:   Family History   Problem Relation Name Age of Onset    Breast cancer Paternal Grandmother      Colon cancer Neg Hx      Ovarian cancer Neg Hx       SOCIAL HISTORY:   Social History     Socioeconomic History    Marital status: Single   Tobacco Use    Smoking status: Never    Smokeless tobacco: Never     Social Determinants of Health     Financial Resource Strain: Low Risk  (8/2/2024)    Received from [facility]    Overall Financial Resource Strain (CARDIA)     Difficulty of Paying Living Expenses: Not hard at all   Food Insecurity: No Food Insecurity (8/2/2024)    Received from [facility]    Hunger Vital Sign     Worried About Running Out of Food in the Last Year: Never true     Ran Out of Food in the Last Year: Never true   Transportation Needs: No Transportation Needs (8/2/2024)    Received from [facility]    PRAPARE - Transportation     Lack of Transportation (Medical): No     Lack of Transportation (Non-Medical): No   Physical Activity: Insufficiently Active (8/2/2024)    Received from [facility]    Exercise Vital Sign     Days of Exercise per Week: 4 days     Minutes of Exercise per Session: 30 min   Stress: No Stress Concern Present (8/2/2024)    Received from [facility]    [facility] - Occupational Stress Questionnaire     Feeling of Stress : Only a  alonzo   Housing Stability: Unknown (6/6/2024)    Housing Stability Vital Sign     Unable to Pay for Housing in the Last Year: No       MEDICATIONS:   Current Outpatient Medications:     calcium/folic ac/multivit-min (MULTIVITAMIN-MIN-CALCIUM-FA ORAL), Take 1 capsule by mouth once daily., Disp: , Rfl:     estradioL (ESTRACE) 1 MG tablet, Take 1 mg by mouth once daily., Disp: , Rfl:     hydroxychloroquine (PLAQUENIL) 200 mg tablet, , Disp: , Rfl: 0    hydrOXYzine pamoate (VISTARIL) 25 MG Cap, take 1 capsule by mouth four times a day, Disp: , Rfl: 0    levothyroxine (SYNTHROID) 75 MCG tablet, Take 75 mcg by mouth once daily., Disp: , Rfl: 0    omeprazole (PRILOSEC) 40 MG capsule, Take 40 mg by mouth every morning., Disp: , Rfl:     prednisoLONE acetate (PRED FORTE) 1 % DrpS, , Disp: , Rfl: 0    vitamin D (VITAMIN D3) 1000 units Tab, Take 1,000 Units by mouth once daily., Disp: , Rfl:     aspirin 81 MG Chew, Take 1 tablet (81 mg total) by mouth 2 (two) times a day. for 14 days, Disp: 28 tablet, Rfl: 0    BIFIDOBACTERIUM ANIMALIS ORAL, Take 1 tablet by mouth once daily., Disp: , Rfl:     levocetirizine (XYZAL) 5 MG tablet, Take 5 mg by mouth nightly as needed for Allergies. (Patient not taking: Reported on 8/23/2024), Disp: , Rfl: 0    oxyCODONE-acetaminophen (PERCOCET)  mg per tablet, Take 1 tablet by mouth every 6 (six) hours as needed for Pain., Disp: 28 tablet, Rfl: 0    promethazine (PHENERGAN) 25 MG tablet, Take 1 tablet (25 mg total) by mouth every 6 (six) hours as needed for Nausea., Disp: 20 tablet, Rfl: 0    traMADoL (ULTRAM) 50 mg tablet, Take 1 tablet (50 mg total) by mouth every 6 (six) hours as needed for Pain., Disp: 20 tablet, Rfl: 0    triamcinolone acetonide 0.1% (KENALOG) 0.1 % cream, Apply topically 2 (two) times daily., Disp: 45 g, Rfl: 2    turmeric-ging-olive-oreg-capry 100 mg-150 mg- 50 mg-150 mg Cap, Take 1 tablet by mouth once daily. (Patient not taking: Reported on 8/23/2024), Disp: ,  Rfl:     valACYclovir (VALTREX) 500 MG tablet, Take 1 tablet (500 mg total) by mouth once daily. for 30 doses, Disp: 30 tablet, Rfl: 11  ALLERGIES: Review of patient's allergies indicates:  No Known Allergies    VITAL SIGNS: /66   Pulse 80   Wt 55.8 kg (123 lb 2 oz)   LMP 11/28/2019   BMI 21.81 kg/m²      Risks, indications and benefits of the surgical procedure were discussed with the patient. All questions with regard to surgery, rehab, expected return to functional activities, activities of daily living and recreational endeavors were answered to her satisfaction.    It was explained to the patient that there may be an increase in surgical risks if the patient has certain co-morbidities such as but not limited to: Obesity, Cardiovascular issues (CHF, CAD, Arrhythmias), chronic pulmonary issues, previous or current neurovascular/neurological issues, previous strokes, diabetes mellitus, previous wound healing issues, previous wound or skin infections, PVD, clotting disorders, if the patient uses chronic steroids, if the patient takes or has immune compromising medications or diseases, or has previously or currently used tobacco products.     The patient verbalized that he/she does not have any additional clotting, bleeding, or blood disorders, other than what is list in her chart on today's review.     Then a brief history and physical exam were performed.    Review of Systems   Constitution: Negative. Negative for chills, fever and night sweats.   HENT: Negative for congestion and headaches.    Eyes: Negative for blurred vision, left vision loss and right vision loss.   Cardiovascular: Negative for chest pain and syncope.   Respiratory: Negative for cough and shortness of breath.    Endocrine: Negative for polydipsia, polyphagia and polyuria.   Hematologic/Lymphatic: Negative for bleeding problem. Does not bruise/bleed easily.   Skin: Negative for dry skin, itching and rash.   Musculoskeletal: Negative  for falls and muscle weakness.   Gastrointestinal: Negative for abdominal pain and bowel incontinence.   Genitourinary: Negative for bladder incontinence and nocturia.   Neurological: Negative for disturbances in coordination, loss of balance and seizures.   Psychiatric/Behavioral: Negative for depression. The patient does not have insomnia.    Allergic/Immunologic: Negative for hives and persistent infections.     PHYSICAL EXAM:  GEN: A&Ox3, WD WN NAD  HEENT: WNL  CHEST: CTAB, no W/R/R  HEART: RRR, no M/R/G  ABD: Soft, NT ND, BS x4 QUADS  MS; See Epic  NEURO: CN II-XII intact       The surgical consent was then reviewed with the patient, who agreed with all the contents of the consent form and it was signed. she was then given the [facility] surgery packet to bring with her to surgery for the anesthesia portion of her perioperative paperwork.   For all physicians except for Dr. Paredes, we will email and possibly fax the consent forms and booking sheets to [facility] pre-admit.    The patient was given the opportunity to ask questions about the surgical plan and consent form, and once no other questions were asked, I proceeded with the pre-op appointment.    PHYSICAL THERAPY:  She was also instructed regarding physical therapy and will begin on POD#3-5 at the [place] location.     POST OP CARE:instructions were reviewed including care of the wound and dressing after surgery and when she can shower.     CRUTCHES OR WALKER: It was explained to the patient that if they are having a lower extremity surgery that they will require either a walker or crutches to ambulate safely with after surgery. It was explained that a cane or other assistive devices are not sufficient to safely ambulate with after surgery. I explained to the patient that I will place an order for them to receive either crutches or a walker after surgery to go home with. It was explained that if they have crutches or a walker at  home already, that they are REQUIRED to bring them to the hospital on the day of surgery. It was explained that if they do not have them at the hospital on the day of surgery that they WILL be provided a new pair or crutches or a walker to go home with to ensure ambulation will be safe if the patient needs to stop somewhere on the way home.      PAIN MANAGEMENT: Skye Marte was also given their pain management regimen, which includes the TENS unit given to her by [facility] along with the education required for its use. She was also instructed regarding the Polar ice unit that will be in place after surgery and her postoperative pain medications.     PAIN MEDICATION:  Percocet 10/325mg 1 po q 4-6 hours prn pain  Ultram 50 mg Take 1-2 p.o. q.6 hours p.r.n. breakthrough pain,   Phenergan 25 mg one p.o. q.6 hours p.r.n. nausea and vomiting.    Post op meds to be delivered bedside prior to discharge. Deliver to family if patient is in surgery at 5pm.    The patient was told that narcotic pain medications may make them drowsy and instructions were given to not sign legal documents, drive or operate heavy machinery, cars, or equipment while under the influence of narcotic medications. The patient was told and understands that narcotic pain medications should only be used as needed to control pain and that other options of pain control include TENs unit and ice packs/unit.     Patient was instructed to purchase and take Colace to counter possible GI side effects of taking opiates.     DVT prophylaxis was discussed with the patient today including risk factors for developing DVTs and history of DVTs. The patient was asked if any specific recommendations were given from the doctor/s that did pre-operative surgical clearance. The patient was then given an education sheet about DVTs and PE with warning signs and symptoms of both and steps to take if they suspect either of these.    Patient was asked if they were  taking or using OCP pills or devices. If they answered yes, then they were instructed to stop using OCPs at this pre-operative appointment until 2 months post-op to help prevent DVT development. They understand that there are other forms of birth control that do not involve hormones. They expressed understanding that ignoring/not following this instruction could result in a DVT which could turn into a deadly pulmonary embolism.     This along with the Modified Caprini risk assessment model for VTE in general surgical patients was used to determine the patient's DVT risk.     From: Evelyn MK, Alfa DA, Amanda SM, et al. Prevention of VTE in nonorthopedic surgical patients: antithrombotic therapy and prevention of thrombosis, 9th ed: American College of Chest Physicians evidence-based clinical practical guidelines. Chest 2012; 141:e227S. Copyright © 2012. Reproduced with permission from the American College of Chest Physicians.    The below listed DVT prophylaxis regimen was discussed along with SCDs during surgery and bilateral DAISY compression stockings to be used post-op. Length of treatment has been determined to be 10-42 days post-op by the above noted Caprini assessment model. Early ambulation post-op was also discussed and emphasized with the patient.     The patient was instructed to buy and take:  Aspirin 81mg BID x 2 weeks for DVT prophylaxis starting on the morning after surgery.  Patient will also use bilateral TEDs on lower extremities, SCDs during surgery, and early ambulation post-op. If the patient was previously taking 81mg baby aspirin, they were told to not take it starting 5 days prior to surgery and to restart the 81mg aspirin after surgery.       Patient was also told to buy over the counter Prilosec medication if needed and take it once daily for GI protection as long as they are taking NSAIDs or Aspirin.    Patient denies history of seizures.     I explained to following and the patient expressed  understanding:  The patient is currently aware of the COVID19 pandemic and that proceeding with their surgical procedure could potentially increase exposure to coronavirus in the community. The patient understands that there is the possibility of delayed or cancelled appts or PT visits in the future. They understand that infection with the coronavirus could complicate their surgery recovery. They are aware of the current policies and procedures of [facility] and the government regarding the pandemic and they were given the option of delaying my surgery. The patient elects to proceed with surgery at this time.     The patient was instructed to practice strict social distancing, hand washing/hygiene, respiratory hygiene, and cough etiquette from now until 6 weeks following surgery to reduce the risk of cruz coronavirus.    As there were no other questions to be asked, she was given my business card along with Isidro Paredes MD business card if she has any questions or concerns prior to surgery or in the postop period.

## 2024-08-27 ENCOUNTER — TELEPHONE (OUTPATIENT)
Dept: SPORTS MEDICINE | Facility: CLINIC | Age: 49
End: 2024-08-27
Payer: COMMERCIAL

## 2024-08-27 ENCOUNTER — ANESTHESIA EVENT (OUTPATIENT)
Dept: SURGERY | Facility: HOSPITAL | Age: 49
End: 2024-08-27
Payer: COMMERCIAL

## 2024-08-28 ENCOUNTER — HOSPITAL ENCOUNTER (OUTPATIENT)
Facility: HOSPITAL | Age: 49
Discharge: HOME | End: 2024-08-28
Attending: ORTHOPAEDIC SURGERY | Admitting: ORTHOPAEDIC SURGERY
Payer: COMMERCIAL

## 2024-08-28 ENCOUNTER — ANESTHESIA (OUTPATIENT)
Dept: SURGERY | Facility: HOSPITAL | Age: 49
End: 2024-08-28
Payer: COMMERCIAL

## 2024-08-28 VITALS
TEMPERATURE: 98 F | SYSTOLIC BLOOD PRESSURE: 109 MMHG | BODY MASS INDEX: 21.79 KG/M2 | HEART RATE: 67 BPM | HEIGHT: 63 IN | WEIGHT: 123 LBS | DIASTOLIC BLOOD PRESSURE: 59 MMHG | OXYGEN SATURATION: 100 % | RESPIRATION RATE: 19 BRPM

## 2024-08-28 DIAGNOSIS — M75.101 RIGHT ROTATOR CUFF TEAR: Primary | ICD-10-CM

## 2024-08-28 DIAGNOSIS — R52 PAIN: ICD-10-CM

## 2024-08-28 PROCEDURE — 63600175 PHARM REV CODE 636 W HCPCS: Performed by: NURSE ANESTHETIST, CERTIFIED REGISTERED

## 2024-08-28 PROCEDURE — 63600175 PHARM REV CODE 636 W HCPCS: Performed by: ANESTHESIOLOGY

## 2024-08-28 PROCEDURE — 27100025 HC TUBING, SET FLUID WARMER: Performed by: ANESTHESIOLOGY

## 2024-08-28 PROCEDURE — 63600175 PHARM REV CODE 636 W HCPCS: Performed by: SURGERY

## 2024-08-28 PROCEDURE — 29826 SHO ARTHRS SRG DECOMPRESSION: CPT | Mod: RT,,, | Performed by: ORTHOPAEDIC SURGERY

## 2024-08-28 PROCEDURE — 36000710: Performed by: ORTHOPAEDIC SURGERY

## 2024-08-28 PROCEDURE — C1713 ANCHOR/SCREW BN/BN,TIS/BN: HCPCS | Performed by: ORTHOPAEDIC SURGERY

## 2024-08-28 PROCEDURE — 37000008 HC ANESTHESIA 1ST 15 MINUTES: Performed by: ORTHOPAEDIC SURGERY

## 2024-08-28 PROCEDURE — 71000033 HC RECOVERY, INTIAL HOUR: Performed by: ORTHOPAEDIC SURGERY

## 2024-08-28 PROCEDURE — 25000003 PHARM REV CODE 250: Performed by: NURSE ANESTHETIST, CERTIFIED REGISTERED

## 2024-08-28 PROCEDURE — 23430 REPAIR BICEPS TENDON: CPT | Mod: 51,RT,, | Performed by: ORTHOPAEDIC SURGERY

## 2024-08-28 PROCEDURE — 94761 N-INVAS EAR/PLS OXIMETRY MLT: CPT

## 2024-08-28 PROCEDURE — 25000003 PHARM REV CODE 250: Performed by: STUDENT IN AN ORGANIZED HEALTH CARE EDUCATION/TRAINING PROGRAM

## 2024-08-28 PROCEDURE — 71000015 HC POSTOP RECOV 1ST HR: Performed by: ORTHOPAEDIC SURGERY

## 2024-08-28 PROCEDURE — 27201423 OPTIME MED/SURG SUP & DEVICES STERILE SUPPLY: Performed by: ORTHOPAEDIC SURGERY

## 2024-08-28 PROCEDURE — 36000711: Performed by: ORTHOPAEDIC SURGERY

## 2024-08-28 PROCEDURE — 64416 NJX AA&/STRD BRCH PL NFS IMG: CPT | Performed by: SURGERY

## 2024-08-28 PROCEDURE — 37000009 HC ANESTHESIA EA ADD 15 MINS: Performed by: ORTHOPAEDIC SURGERY

## 2024-08-28 PROCEDURE — 64416 NJX AA&/STRD BRCH PL NFS IMG: CPT | Mod: 59,RT,, | Performed by: SURGERY

## 2024-08-28 PROCEDURE — 99900035 HC TECH TIME PER 15 MIN (STAT)

## 2024-08-28 PROCEDURE — 29824 SHO ARTHRS SRG DSTL CLAVICLC: CPT | Mod: 51,RT,, | Performed by: ORTHOPAEDIC SURGERY

## 2024-08-28 PROCEDURE — 63600175 PHARM REV CODE 636 W HCPCS: Performed by: ORTHOPAEDIC SURGERY

## 2024-08-28 PROCEDURE — 25000003 PHARM REV CODE 250: Performed by: SURGERY

## 2024-08-28 PROCEDURE — 25000003 PHARM REV CODE 250: Performed by: ANESTHESIOLOGY

## 2024-08-28 PROCEDURE — 29827 SHO ARTHRS SRG RT8TR CUF RPR: CPT | Mod: RT,,, | Performed by: ORTHOPAEDIC SURGERY

## 2024-08-28 PROCEDURE — 63600175 PHARM REV CODE 636 W HCPCS: Performed by: STUDENT IN AN ORGANIZED HEALTH CARE EDUCATION/TRAINING PROGRAM

## 2024-08-28 DEVICE — PROXIMAL TENODESIS IMPLANT SYSTEM REV: 0
Type: IMPLANTABLE DEVICE | Site: SHOULDER | Status: FUNCTIONAL
Brand: ARTHREX®

## 2024-08-28 DEVICE — ANCHOR HEALIX 5.5 ADV BR3: Type: IMPLANTABLE DEVICE | Site: SHOULDER | Status: FUNCTIONAL

## 2024-08-28 DEVICE — IMPLANTABLE DEVICE: Type: IMPLANTABLE DEVICE | Site: SHOULDER | Status: FUNCTIONAL

## 2024-08-28 RX ORDER — ACETAMINOPHEN 500 MG
1000 TABLET ORAL
Status: COMPLETED | OUTPATIENT
Start: 2024-08-28 | End: 2024-08-28

## 2024-08-28 RX ORDER — EPINEPHRINE 1 MG/ML
INJECTION, SOLUTION, CONCENTRATE INTRAVENOUS
Status: DISCONTINUED | OUTPATIENT
Start: 2024-08-28 | End: 2024-08-28 | Stop reason: HOSPADM

## 2024-08-28 RX ORDER — ROPIVACAINE HYDROCHLORIDE 5 MG/ML
INJECTION, SOLUTION EPIDURAL; INFILTRATION; PERINEURAL
Status: COMPLETED | OUTPATIENT
Start: 2024-08-28 | End: 2024-08-28

## 2024-08-28 RX ORDER — ONDANSETRON HYDROCHLORIDE 2 MG/ML
INJECTION, SOLUTION INTRAMUSCULAR; INTRAVENOUS
Status: DISCONTINUED | OUTPATIENT
Start: 2024-08-28 | End: 2024-08-28

## 2024-08-28 RX ORDER — CELECOXIB 200 MG/1
400 CAPSULE ORAL
Status: COMPLETED | OUTPATIENT
Start: 2024-08-28 | End: 2024-08-28

## 2024-08-28 RX ORDER — BUPIVACAINE HYDROCHLORIDE 2.5 MG/ML
INJECTION, SOLUTION EPIDURAL; INFILTRATION; INTRACAUDAL
Status: DISCONTINUED | OUTPATIENT
Start: 2024-08-28 | End: 2024-08-28 | Stop reason: HOSPADM

## 2024-08-28 RX ORDER — FAMOTIDINE 10 MG/ML
INJECTION INTRAVENOUS
Status: DISCONTINUED | OUTPATIENT
Start: 2024-08-28 | End: 2024-08-28

## 2024-08-28 RX ORDER — PROPOFOL 10 MG/ML
VIAL (ML) INTRAVENOUS
Status: DISCONTINUED | OUTPATIENT
Start: 2024-08-28 | End: 2024-08-28

## 2024-08-28 RX ORDER — EPHEDRINE SULFATE 50 MG/ML
INJECTION, SOLUTION INTRAVENOUS
Status: DISCONTINUED | OUTPATIENT
Start: 2024-08-28 | End: 2024-08-28

## 2024-08-28 RX ORDER — NEOSTIGMINE METHYLSULFATE 0.5 MG/ML
INJECTION INTRAVENOUS
Status: DISCONTINUED | OUTPATIENT
Start: 2024-08-28 | End: 2024-08-28

## 2024-08-28 RX ORDER — FENTANYL CITRATE 50 UG/ML
25 INJECTION, SOLUTION INTRAMUSCULAR; INTRAVENOUS EVERY 5 MIN PRN
Status: DISCONTINUED | OUTPATIENT
Start: 2024-08-28 | End: 2024-08-28 | Stop reason: HOSPADM

## 2024-08-28 RX ORDER — MUPIROCIN 20 MG/G
OINTMENT TOPICAL
Status: DISCONTINUED | OUTPATIENT
Start: 2024-08-28 | End: 2024-08-28 | Stop reason: HOSPADM

## 2024-08-28 RX ORDER — METHOCARBAMOL 500 MG/1
1000 TABLET, FILM COATED ORAL ONCE
Status: COMPLETED | OUTPATIENT
Start: 2024-08-28 | End: 2024-08-28

## 2024-08-28 RX ORDER — DEXAMETHASONE SODIUM PHOSPHATE 4 MG/ML
INJECTION, SOLUTION INTRA-ARTICULAR; INTRALESIONAL; INTRAMUSCULAR; INTRAVENOUS; SOFT TISSUE
Status: DISCONTINUED | OUTPATIENT
Start: 2024-08-28 | End: 2024-08-28

## 2024-08-28 RX ORDER — LANOLIN ALCOHOL/MO/W.PET/CERES
1 CREAM (GRAM) TOPICAL
COMMUNITY

## 2024-08-28 RX ORDER — HALOPERIDOL 5 MG/ML
0.5 INJECTION INTRAMUSCULAR EVERY 10 MIN PRN
Status: DISCONTINUED | OUTPATIENT
Start: 2024-08-28 | End: 2024-08-28 | Stop reason: HOSPADM

## 2024-08-28 RX ORDER — FENTANYL CITRATE 50 UG/ML
25-200 INJECTION, SOLUTION INTRAMUSCULAR; INTRAVENOUS
Status: DISCONTINUED | OUTPATIENT
Start: 2024-08-28 | End: 2024-08-28 | Stop reason: HOSPADM

## 2024-08-28 RX ORDER — ROCURONIUM BROMIDE 10 MG/ML
INJECTION, SOLUTION INTRAVENOUS
Status: DISCONTINUED | OUTPATIENT
Start: 2024-08-28 | End: 2024-08-28

## 2024-08-28 RX ORDER — CETIRIZINE HYDROCHLORIDE 10 MG/1
10 TABLET ORAL DAILY
COMMUNITY

## 2024-08-28 RX ORDER — ROPIVACAINE HYDROCHLORIDE 2 MG/ML
INJECTION, SOLUTION EPIDURAL; INFILTRATION; PERINEURAL CONTINUOUS
Status: DISCONTINUED | OUTPATIENT
Start: 2024-08-28 | End: 2024-08-28 | Stop reason: HOSPADM

## 2024-08-28 RX ORDER — MIDAZOLAM HYDROCHLORIDE 1 MG/ML
.5-4 INJECTION, SOLUTION INTRAMUSCULAR; INTRAVENOUS
Status: DISCONTINUED | OUTPATIENT
Start: 2024-08-28 | End: 2024-08-28 | Stop reason: HOSPADM

## 2024-08-28 RX ORDER — OXYCODONE HYDROCHLORIDE 5 MG/1
5 TABLET ORAL
Status: DISCONTINUED | OUTPATIENT
Start: 2024-08-28 | End: 2024-08-28 | Stop reason: HOSPADM

## 2024-08-28 RX ORDER — SODIUM CHLORIDE 0.9 % (FLUSH) 0.9 %
3 SYRINGE (ML) INJECTION
Status: DISCONTINUED | OUTPATIENT
Start: 2024-08-28 | End: 2024-08-28 | Stop reason: HOSPADM

## 2024-08-28 RX ORDER — KETAMINE HCL IN 0.9 % NACL 50 MG/5 ML
SYRINGE (ML) INTRAVENOUS
Status: DISCONTINUED | OUTPATIENT
Start: 2024-08-28 | End: 2024-08-28

## 2024-08-28 RX ADMIN — MIDAZOLAM 2 MG: 1 INJECTION INTRAMUSCULAR; INTRAVENOUS at 12:08

## 2024-08-28 RX ADMIN — HALOPERIDOL LACTATE 0.5 MG: 5 INJECTION, SOLUTION INTRAMUSCULAR at 05:08

## 2024-08-28 RX ADMIN — FENTANYL CITRATE 100 MCG: 50 INJECTION, SOLUTION INTRAMUSCULAR; INTRAVENOUS at 12:08

## 2024-08-28 RX ADMIN — ACETAMINOPHEN 1000 MG: 500 TABLET ORAL at 10:08

## 2024-08-28 RX ADMIN — NEOSTIGMINE METHYLSULFATE 3 MG: 0.5 INJECTION INTRAVENOUS at 04:08

## 2024-08-28 RX ADMIN — ROCURONIUM BROMIDE 50 MG: 10 INJECTION, SOLUTION INTRAVENOUS at 01:08

## 2024-08-28 RX ADMIN — OXYCODONE HYDROCHLORIDE 5 MG: 5 TABLET ORAL at 05:08

## 2024-08-28 RX ADMIN — CELECOXIB 400 MG: 200 CAPSULE ORAL at 10:08

## 2024-08-28 RX ADMIN — DEXAMETHASONE SODIUM PHOSPHATE 8 MG: 4 INJECTION, SOLUTION INTRAMUSCULAR; INTRAVENOUS at 01:08

## 2024-08-28 RX ADMIN — FAMOTIDINE 20 MG: 10 INJECTION, SOLUTION INTRAVENOUS at 01:08

## 2024-08-28 RX ADMIN — Medication: at 04:08

## 2024-08-28 RX ADMIN — FENTANYL CITRATE 50 MCG: 50 INJECTION, SOLUTION INTRAMUSCULAR; INTRAVENOUS at 03:08

## 2024-08-28 RX ADMIN — EPHEDRINE SULFATE 15 MG: 50 INJECTION INTRAVENOUS at 02:08

## 2024-08-28 RX ADMIN — ONDANSETRON 4 MG: 2 INJECTION INTRAMUSCULAR; INTRAVENOUS at 04:08

## 2024-08-28 RX ADMIN — GLYCOPYRROLATE 0.4 MG: 0.2 INJECTION, SOLUTION INTRAMUSCULAR; INTRAVENOUS at 04:08

## 2024-08-28 RX ADMIN — EPHEDRINE SULFATE 10 MG: 50 INJECTION INTRAVENOUS at 02:08

## 2024-08-28 RX ADMIN — CEFAZOLIN 2 G: 2 INJECTION, POWDER, FOR SOLUTION INTRAMUSCULAR; INTRAVENOUS at 01:08

## 2024-08-28 RX ADMIN — SODIUM CHLORIDE: 0.9 INJECTION, SOLUTION INTRAVENOUS at 10:08

## 2024-08-28 RX ADMIN — METHOCARBAMOL 1000 MG: 500 TABLET ORAL at 05:08

## 2024-08-28 RX ADMIN — FENTANYL CITRATE 50 MCG: 50 INJECTION, SOLUTION INTRAMUSCULAR; INTRAVENOUS at 01:08

## 2024-08-28 RX ADMIN — SODIUM CHLORIDE, SODIUM GLUCONATE, SODIUM ACETATE, POTASSIUM CHLORIDE, MAGNESIUM CHLORIDE, SODIUM PHOSPHATE, DIBASIC, AND POTASSIUM PHOSPHATE: .53; .5; .37; .037; .03; .012; .00082 INJECTION, SOLUTION INTRAVENOUS at 02:08

## 2024-08-28 RX ADMIN — Medication 20 MG: at 01:08

## 2024-08-28 RX ADMIN — PROPOFOL 150 MG: 10 INJECTION, EMULSION INTRAVENOUS at 01:08

## 2024-08-28 RX ADMIN — ROPIVACAINE HYDROCHLORIDE 7.5 ML: 5 INJECTION EPIDURAL; INFILTRATION; PERINEURAL at 12:08

## 2024-08-28 NOTE — ANESTHESIA PREPROCEDURE EVALUATION
Pre-operative evaluation for Procedure(s) (LRB):  DEBRIDEMENT, ROTATOR CUFF, ARTHROSCOPIC--[facility] CARE (Right)  ARTHROSCOPY, SHOULDER, WITH DISTAL CLAVICLE EXCISION (Right)  TENODESIS, BICEPS, OPEN (Right)    Skye Marte is a 49 y.o. female     There is no problem list on file for this patient.      Review of patient's allergies indicates:  No Known Allergies    No current facility-administered medications on file prior to encounter.     Current Outpatient Medications on File Prior to Encounter   Medication Sig Dispense Refill    calcium/folic ac/multivit-min (MULTIVITAMIN-MIN-CALCIUM-FA ORAL) Take 1 capsule by mouth once daily.      cetirizine (ZYRTEC) 10 MG tablet Take 10 mg by mouth once daily.      estradioL (ESTRACE) 1 MG tablet Take 1 mg by mouth once daily.      ferrous sulfate (FEOSOL) Tab tablet Take 1 tablet by mouth daily with breakfast.      hydroxychloroquine (PLAQUENIL) 200 mg tablet   0    levothyroxine (SYNTHROID) 75 MCG tablet Take 75 mcg by mouth once daily.  0    omeprazole (PRILOSEC) 40 MG capsule Take 40 mg by mouth every morning.      prednisoLONE acetate (PRED FORTE) 1 % DrpS   0    vitamin D (VITAMIN D3) 1000 units Tab Take 1,000 Units by mouth once daily.      zinc sulfate (ZINC-15 ORAL) Take by mouth.      BIFIDOBACTERIUM ANIMALIS ORAL Take 1 tablet by mouth once daily.      hydrOXYzine pamoate (VISTARIL) 25 MG Cap take 1 capsule by mouth four times a day  0    levocetirizine (XYZAL) 5 MG tablet Take 5 mg by mouth nightly as needed for Allergies. (Patient not taking: Reported on 8/23/2024)  0    triamcinolone acetonide 0.1% (KENALOG) 0.1 % cream Apply topically 2 (two) times daily. 45 g 2    turmeric-ging-olive-oreg-capry 100 mg-150 mg- 50 mg-150 mg Cap Take 1 tablet by mouth once daily. (Patient not taking: Reported on 8/23/2024)      valACYclovir (VALTREX) 500 MG tablet Take 1 tablet (500 mg total) by mouth once daily. for 30 doses 30 tablet 11       Past Surgical History:  "  Procedure Laterality Date    COLONOSCOPY      TONSILLECTOMY           CBC:  No results found for: "WBC", "RBC", "HGB", "HCT", "PLT", "MCV", "MCH", "MCHC"    CMP:   Lab Results   Component Value Date     08/02/2024    K 4.5 08/02/2024    CO2 28 08/02/2024    BUN 13 08/02/2024    CREATININE 0.87 08/02/2024    GLU 82 08/02/2024    CALCIUM 9.1 08/02/2024    ALBUMIN 4.4 08/02/2024    ALKPHOS 46 08/02/2024    ALT 14 08/02/2024    AST 17 08/02/2024    BILITOT 0.5 08/02/2024       INR:  No results found for: "PT", "INR", "PROTIME", "APTT"      Diagnostic Studies:      EKG:   No results found for this or any previous visit.     2D Echo:  No results found for this or any previous visit.    Stress Test:   No results found for this or any previous visit.      Pre-op Vitals [08/28/24 1022]   BP Pulse Resp Temp SpO2   117/66 68 20 36.7 °C (98.1 °F) 98 %      Height Weight BMI (Calculated)     5' 3" 123 lb 21.8         Pre-op Vitals [08/28/24 1022]   BP Pulse Resp Temp SpO2   117/66 68 20 36.7 °C (98.1 °F) 98 %      Height Weight BMI (Calculated)     5' 3" 123 lb 21.8            Pre-op Assessment    I have reviewed the Patient Summary Reports.       I have reviewed the Medications.     Review of Systems  Anesthesia Hx:  No previous Anesthesia   Neg history of prior surgery.             Social:  Non-Smoker, Social Alcohol Use       Hematology/Oncology:  Hematology Normal   Oncology Normal                                   EENT/Dental:   Thyroid nodule          Cardiovascular:  Exercise tolerance: good       Denies CAD.       Denies Angina.     hyperlipidemia  Denies MORENO.  ECG has been reviewed.                          Pulmonary:  Pulmonary Normal   Denies COPD.  Denies Asthma.   Denies Shortness of breath.                  Renal/:  Renal/ Normal                 Hepatic/GI:  Hepatic/GI Normal     Denies GERD. Denies Liver Disease.            Musculoskeletal:  Arthritis   Tear of right rotator cuff,   Arthritis of right " acromioclavicular joint,  Biceps tendinitis of right upper extremity              Neurological:  Neurology Normal   Denies CVA.    Denies Headaches. Denies Seizures.                                Endocrine:   Hypothyroidism  Prediabetes, HA1C = 5.8 on 8/2/2024        Psych:  Psychiatric Normal                    Physical Exam  General: Well nourished, Cooperative, Alert and Oriented    Airway:  Mallampati: II   Mouth Opening: Normal  TM Distance: Normal  Tongue: Normal  Neck ROM: Normal ROM    Dental:  Intact    Heart:  Rate: Normal  Rhythm: Regular Rhythm        Anesthesia Plan  Type of Anesthesia, risks & benefits discussed:    Anesthesia Type: Gen ETT, Regional  Intra-op Monitoring Plan: Standard ASA Monitors  Post Op Pain Control Plan: multimodal analgesia, IV/PO Opioids PRN and peripheral nerve block  Induction:  IV  Airway Plan: Video, Post-Induction  Informed Consent: Patient consented to blood products? No  ASA Score: 1  Day of Surgery Review of History & Physical: H&P Update referred to the surgeon/provider.    Ready For Surgery From Anesthesia Perspective.     .

## 2024-08-28 NOTE — ANESTHESIA POSTPROCEDURE EVALUATION
Anesthesia Post Evaluation    Patient: Skye Marte    Procedure(s) Performed: Procedure(s) (LRB):  DEBRIDEMENT, ROTATOR CUFF, ARTHROSCOPIC--[facility] (Right)  ARTHROSCOPY, SHOULDER, WITH DISTAL CLAVICLE EXCISION (Right)  TENODESIS, BICEPS, OPEN (Right)  ARTHROSCOPY, SHOULDER, WITH SUBACROMIAL SPACE DECOMPRESSION (Right)  REPAIR, ROTATOR CUFF, ARTHROSCOPIC    Final Anesthesia Type: general      Patient location during evaluation: PACU  Patient participation: Yes- Able to Participate  Level of consciousness: awake and alert  Post-procedure vital signs: reviewed and stable  Pain management: adequate  Airway patency: patent  ANN MARIE mitigation strategies: Multimodal analgesia  PONV status at discharge: No PONV  Anesthetic complications: no      Cardiovascular status: blood pressure returned to baseline  Respiratory status: unassisted  Hydration status: euvolemic  Follow-up not needed.              Vitals Value Taken Time   /59 08/28/24 1715   Temp 97.6 08/28/24 1726   Pulse 67 08/28/24 1715   Resp 19 08/28/24 1715   SpO2 100 % 08/28/24 1715         No case tracking events are documented in the log.      Pain/Regine Score: Pain Rating Prior to Med Admin: 5 (8/28/2024  5:09 PM)  Regine Score: 10 (8/28/2024  5:11 PM)

## 2024-08-28 NOTE — OP NOTE
[facility])  Surgery Department  Operative Note    SUMMARY     Date of Procedure: 8/28/2024     Procedure: Procedure(s) (LRB):  DEBRIDEMENT, ROTATOR CUFF, ARTHROSCOPIC--POLAR CARE (Right)  ARTHROSCOPY, SHOULDER, WITH DISTAL CLAVICLE EXCISION (Right)  TENODESIS, BICEPS, OPEN (Right)  ARTHROSCOPY, SHOULDER, WITH SUBACROMIAL SPACE DECOMPRESSION (Right)  REPAIR, ROTATOR CUFF, ARTHROSCOPIC     Surgeons and Role:     * Isidro Paredes MD - Primary    Assisting Surgeon:   Neri Connell MD    Pre-Operative Diagnosis: Arthritis of right acromioclavicular joint [M19.011]  Biceps tendinitis of right upper extremity [M75.21]  Tear of right rotator cuff, unspecified tear extent, unspecified whether traumatic [M75.101]    Post-Operative Diagnosis: Post-Op Diagnosis Codes:     * Arthritis of right acromioclavicular joint [M19.011]     * Biceps tendinitis of right upper extremity [M75.21]     * Tear of right rotator cuff, unspecified tear extent, unspecified whether traumatic [M75.101]    Anesthesia: General    Technical Procedures Used:     DATE OF SURGERY:  8/28/2024     PREOPERATIVE DIAGNOSES:   1. Right shoulder rotator cuff tear.   2. Right shoulder biceps tendinopathy  3. Right shoulder AC joint arthritis  4. Right shoulder labral tear    POSTOPERATIVE DIAGNOSES:   1. Right shoulder rotator cuff tear.   2. Right shoulder biceps tendinopathy  3. Right shoulder AC joint arthritis  4. Right shoulder labral tear    PROCEDURE:   1. Right shoulder arthroscopic rotator cuff repair.   2. Right shoulder open subpectoral biceps tenodesis  3. Right shoulder arthroscopic distal clavicle excision  4. Right shoulder arthroscopic subacromial decompression.   5. Right shoulder arthroscopic debridement labrum    SURGEON: Isidro Paredes M.D.     ASSISTANT:   Neri Connell MD      COMPLICATIONS: None.     POSITION: Beach chair.     ANESTHESIA: General endotracheal plus right upper extremity interscalene   block.      EXAMINATION UNDER ANESTHESIA: Right shoulder forward flexion 180 degrees,   abduction 120 degrees, full internal and external rotation   1+ anterior drawer, 1+ sulcus sign, 1+ posterior drawer.     ARTHROSCOPIC FINDINGS:   1. Full thickness, crescent-shaped medium size supraspinatus / upper infraspinatus rotator cuff tear.   2. Small anterior acromial spur.   3. Arthritic distal clavicle  4. Intact glenohumeral cartilage surface  5. Biceps: moderate grade proximal tendinopathy  6. Subscapularis: min fraying  7. Labrum:  min fraying superior labrum      INDICATIONS FOR PROCEDURE: The patient is a 49 y.o.  year-old female  who has pain in her right shoulder. MRI confirms a tear of her rotator cuff.  After risks and benefits of surgery were discussed, she elects to proceed with operative  intervention. All risks and benefits have been discussed including the risks of stiffness for recurrent instability, irreparability of the tear, damage to neurovascular structures, damage to cartilage and the risk of anesthesia including stroke and heart attack. The patient seemed to understand and wished to proceed.     DESCRIPTION OF PROCEDURE: The patient was brought in the room. After undergoing general endotracheal anesthesia and right upper extremity interscalene block, she was placed in a well-padded modified beachchair position. Perioperative antibiotics were given.  her right upper extremity was prepped and draped in usual sterile fashion including the use of a sterile Spider arm kelly.     After time-out was performed, the standard posterior portal and anterior superior portal were created. Diagnostic arthroscopy performed. The glenohumeral joint was entered. There was no chondromalacia noted in the glenoid or humeral head. There was minimal mild fraying at the superior labrum. The anterior inferior labrum and posterior labrum were intact without evidence of tearing. The subscapularis had mild fraying.  Damaged subscap  tissue was debrided down.  The remainder of the insertion was intact.    There was a medium size full-thickness delaminated crescent shaped tear of the supraspinatus tendon.     DEBRIDEMENT: Oscillating shaver, straight and curved biters were used to debride a small flap of tissue off the superior labrum. The biceps had proximal biceps tendinopathy and was released for planned open tenodesis.    SUBACROMIAL DECOMPRESSION: The scope was taken out and redirected in the subacromial space. After excellent visualization was achieved, a lateral portal was created. The bursal tissue was cleaned off. The full-thickness crescent-shaped medium size rotator cuff tear was identified. The area was cleaned off for later repair. The undersurface of the acromion was cleaned off of soft tissues including releasing the CA ligament. A 5-mm aditya was introduced through the posterior portal and decompression was completed using cutting block technique down to a type 1 configuration.     DISTAL CLAVICLE EXCISION:  The soft tissues were cleaned off of the undersurface of the AC joint with Mitek VAPR device. The arthritic aspect of the distal clavicle was visualized and excellent hemostasis was achieved.  A 5-mm aditya was used to resect between 8-9 mm of bone from the distal aspect of the clavicle.  Careful attention was paid to resect adequate bone from the posterior and superior aspect of the AC joint and not to disrupt the superior aspect of the AC joint capsule.    ROTATOR CUFF REPAIR: The footprint of rotator cuff was cleaned off with Mitek VAPR device and oscillating shaver / aditya. This was judged to be amenable for repair with suture anchors.  One triple loaded 5.5 mm anchor was placed at the medial aspect of the footprint of the supraspinatus. All 6 suture limbs from each anchor were brought through in a large horizontal mattress convergence type sutures configuration using a suture penetrator and Esspressew. These were tied  arthroscopically down from lateral to medial.  One knotless lateral anchor was placed and two luggage tag stitches were placed and one of the mattress sutures from the medial anchor was placed through the knotless lateral anchor.  Excellent footprint coverage was achieved after all arthroscopic knots were tied down. Internal and external rotation confirmed excellent footprint compression with no evidence of gap formation.     OPEN SUBPECTORAL BICEPS TENODESIS: The scope was taken out of the joint.  A 3 cm incision was made in the axillary fold for our open subpec biceps tenodesis.  Excellent hemostasis was achieved.  Blunt dissection was taken down to the fascia.  The fascia was released.  A pointed Kylie was placed laterally under the pec.  A blunt Greg was placed medially to protect the neurovascular structures.  An Army-Navy was placed superiorly to complete exposure. Excellent visualization of the biceps groove and biceps tendon was achieved.  The tendon was brought into the wound with a right angle hemostat.  Frayed and tendinopathic aspect were debrided down.  An Arthrex Fiberloop suture started at the musculotendinous junction and was sutured one cm distally.  The excess tendon was removed and excellent control of our biceps was achieved.  A guide wire and 3.5 mm  hole was drilled into the humerus in the sub-pec position approximately one centimeter proximal to the inferior border of the pec major tendon.  Excess bone debris was removed.  An Arthrex proximal biceps 7mm button was loaded with the sutures from the biceps and was inserted into the  hole in the humerus.  Once it was deployed, it was judged to be stable in a unicortical position within the humerus.  The biceps was easily able to be tensioned down to give us an onlay position with proper tension of the biceps muscle.  A curved lange needle was used to shuttle one of the sutures through the tendon and the knot was tied to secure the  biceps in excellent position on the humerus.  The correct length-tension relationship was restored for the biceps as the muscle tendon junction rested directly deep to the pectoralis major.  All areas were irrigated.  The wound was closed with 3-0 vicryl and 4-0 subcuticular monocryl.      Portal sites were closed with 4-0 Monocryl, covered with Mastisol, Steri-Strips, Xeroform, 4 x 4 fluffs, ABD pads and tape. The patient was placed in a sling and pillow for protection, was extubated in the room, transferred to recovery room in stable condition accompanied by her physician.    There were no complications in the case. I was present, scrubbed and did perform all critical portions of the case.     Postop plan for the patient is to follow the medium size rotator cuff repair protocol.         Isidro Paredes M.D.     Description of the Findings of the Procedure: above    Significant Surgical Tasks Conducted by the Assistant(s), if Applicable: none    Complications: No    Estimated Blood Loss (EBL): * No values recorded between 8/28/2024  2:17 PM and 8/28/2024  4:33 PM *           Implants:   Implant Name Type Inv. Item Serial No.  Lot No. LRB No. Used Action   ANCHOR HEALIX 5.5 ADV BR3 - [id number]  ANCHOR HEALIX 5.5 ADV BR3  [employer] [id number] Right 1 Implanted   HEALIX ADVANCE SP PEEK ANCHOR    [employer] [id number] Right 1 Implanted   SYS IMPL PROXIMAL TENODESIS - [id number]  SYS IMPL PROXIMAL TENODESIS  AR[employer]X [id number] Right 1 Implanted       Specimens:   Specimen (24h ago, onward)      None                    Condition: Good    Disposition: PACU - hemodynamically stable.    Attestation: I was present and scrubbed for the entire procedure.    Discharge Note    SUMMARY     Admit Date: 8/28/2024    Discharge Date and Time:  08/28/2024 4:38 PM    Hospital Course (synopsis of major diagnoses, care, treatment, and services provided during the course of the hospital stay): outpatient surgery      Final Diagnosis: Post-Op Diagnosis Codes:     * Arthritis of right acromioclavicular joint [M19.011]     * Biceps tendinitis of right upper extremity [M75.21]     * Tear of right rotator cuff, unspecified tear extent, unspecified whether traumatic [M75.101]    Disposition: Home or Self Care    Follow Up/Patient Instructions:     Medications:  Reconciled Home Medications:      Medication List        CONTINUE taking these medications      aspirin 81 MG Chew  Chew and swallow 1 tablet (81 mg total) by mouth 2 (two) times a day for 14 days     BIFIDOBACTERIUM ANIMALIS ORAL  Take 1 tablet by mouth once daily.     cetirizine 10 MG tablet  Commonly known as: ZYRTEC  Take 10 mg by mouth once daily.     estradioL 1 MG tablet  Commonly known as: ESTRACE  Take 1 mg by mouth once daily.     ferrous sulfate Tab tablet  Commonly known as: FEOSOL  Take 1 tablet by mouth daily with breakfast.     hydroxychloroquine 200 mg tablet  Commonly known as: PLAQUENIL     hydrOXYzine pamoate 25 MG Cap  Commonly known as: VISTARIL  take 1 capsule by mouth four times a day     levothyroxine 75 MCG tablet  Commonly known as: SYNTHROID  Take 75 mcg by mouth once daily.     MULTIVITAMIN-MIN-CALCIUM-FA ORAL  Take 1 capsule by mouth once daily.     omeprazole 40 MG capsule  Commonly known as: PRILOSEC  Take 40 mg by mouth every morning.     oxyCODONE-acetaminophen  mg per tablet  Commonly known as: PERCOCET  Take 1 tablet by mouth every 6 (six) hours as needed for Pain.     prednisoLONE acetate 1 % Drps  Commonly known as: PRED FORTE     promethazine 25 MG tablet  Commonly known as: PHENERGAN  Take 1 tablet (25 mg total) by mouth every 6 (six) hours as needed for Nausea.     traMADoL 50 mg tablet  Commonly known as: ULTRAM  Take 1 tablet (50 mg total) by mouth every 6 (six) hours as needed for Pain.     triamcinolone acetonide 0.1% 0.1 % cream  Commonly known as: KENALOG  Apply topically 2 (two) times daily.     vitamin D 1000 units  Tab  Commonly known as: VITAMIN D3  Take 1,000 Units by mouth once daily.     ZINC-15 ORAL  Take by mouth.            STOP taking these medications      valACYclovir 500 MG tablet  Commonly known as: VALTREX            ASK your doctor about these medications      levocetirizine 5 MG tablet  Commonly known as: XYZAL  Take 5 mg by mouth nightly as needed for Allergies.     turmeric-ging-olive-oreg-capry 100 mg-150 mg- 50 mg-150 mg Cap  Take 1 tablet by mouth once daily.            Discharge Procedure Orders   Diet general     Call MD for:  temperature >100.4     Call MD for:  persistent nausea and vomiting     Call MD for:  severe uncontrolled pain     Call MD for:  difficulty breathing, headache or visual disturbances     Call MD for:  redness, tenderness, or signs of infection (pain, swelling, redness, odor or green/yellow discharge around incision site)     Call MD for:  hives     Call MD for:  persistent dizziness or light-headedness     Call MD for:  extreme fatigue

## 2024-08-28 NOTE — TRANSFER OF CARE
"Anesthesia Transfer of Care Note    Patient: Skye Marte    Procedure(s) Performed: Procedure(s) (LRB):  DEBRIDEMENT, ROTATOR CUFF, ARTHROSCOPIC--POLAR CARE (Right)  ARTHROSCOPY, SHOULDER, WITH DISTAL CLAVICLE EXCISION (Right)  TENODESIS, BICEPS, OPEN (Right)  ARTHROSCOPY, SHOULDER, WITH SUBACROMIAL SPACE DECOMPRESSION (Right)  REPAIR, ROTATOR CUFF, ARTHROSCOPIC    Patient location: PACU    Anesthesia Type: general    Transport from OR: Transported from OR on 6-10 L/min O2 by face mask with adequate spontaneous ventilation    Post pain: adequate analgesia    Post assessment: no apparent anesthetic complications and tolerated procedure well    Post vital signs: stable    Level of consciousness: responds to stimulation and sedated    Nausea/Vomiting: no nausea/vomiting    Complications: none    Transfer of care protocol was followed      Last vitals: Visit Vitals  BP (!) 118/59   Pulse (!) 58   Temp 36.7 °C (98.1 °F) (Oral)   Resp (!) 54   Ht 5' 3" (1.6 m)   Wt 55.8 kg (123 lb)   LMP 11/28/2019   SpO2 100%   Breastfeeding No   BMI 21.79 kg/m²     "

## 2024-08-28 NOTE — PLAN OF CARE
Pre op checklist complete. Pt resting in bed with call light in reach. All questions answered. Pt belongings at bedside and plan to place clothes in locker and partner will hold cell phone and purse. Pt still needs anes consent. S/o brought to bedside.

## 2024-08-28 NOTE — ANESTHESIA PROCEDURE NOTES
Right Interscalene Catheter    Patient location during procedure: pre-op   Block not for primary anesthetic.  Reason for block: at surgeon's request and post-op pain management   Post-op Pain Location: right shoulder   Start time: 8/28/2024 12:11 PM  Timeout: 8/28/2024 12:10 PM   End time: 8/28/2024 12:35 PM    Staffing  Authorizing Provider: Stacey Mays MD  Performing Provider: Frandy Araiza MD    Staffing  Performed by: Frandy Araiza MD  Authorized by: Stacey Mays MD    Preanesthetic Checklist  Completed: patient identified, IV checked, site marked, risks and benefits discussed, surgical consent, monitors and equipment checked, pre-op evaluation and timeout performed  Peripheral Block  Patient position: sitting  Prep: ChloraPrep and site prepped and draped  Patient monitoring: heart rate, cardiac monitor, continuous pulse ox, continuous capnometry and frequent blood pressure checks  Block type: interscalene  Laterality: right  Injection technique: continuous  Needle  Needle type: Tuohy   Needle gauge: 17 G  Needle length: 3.5 in  Needle localization: anatomical landmarks and ultrasound guidance  Catheter type: spring wound  Catheter size: 20 G  Test dose: lidocaine 1.5% with Epi 1-to-200,000 and negative   -ultrasound image captured on disc.  Assessment  Injection assessment: negative aspiration, negative parasthesia and local visualized surrounding nerve  Paresthesia pain: none  Heart rate change: no  Slow fractionated injection: yes  Pain Tolerance: comfortable throughout block and no complaints  Medications:    Medications: ropivacaine (NAROPIN) injection 0.5% - Perineural   7.5 mL - 8/28/2024 12:20:00 PM    Additional Notes  VSS.  [facility] RN monitoring vitals throughout procedure.  Patient tolerated procedure well.  15 cc of 0.25% ropivacaine with epi 1:300k administered for the block.

## 2024-08-28 NOTE — BRIEF OP NOTE
[place] - Surgery ([facility])  Brief Operative Note    Surgery Date: 8/28/2024     Surgeons and Role:     * Isidro Paredes MD - Primary    Assisting Surgeon: None    Pre-op Diagnosis:  Arthritis of right acromioclavicular joint [M19.011]  Biceps tendinitis of right upper extremity [M75.21]  Tear of right rotator cuff, unspecified tear extent, unspecified whether traumatic [M75.101]    Post-op Diagnosis:  Post-Op Diagnosis Codes:     * Arthritis of right acromioclavicular joint [M19.011]     * Biceps tendinitis of right upper extremity [M75.21]     * Tear of right rotator cuff, unspecified tear extent, unspecified whether traumatic [M75.101]    Procedure(s) (LRB):  DEBRIDEMENT, ROTATOR CUFF, ARTHROSCOPIC--POLAR CARE (Right)  ARTHROSCOPY, SHOULDER, WITH DISTAL CLAVICLE EXCISION (Right)  TENODESIS, BICEPS, OPEN (Right)  ARTHROSCOPY, SHOULDER, WITH SUBACROMIAL SPACE DECOMPRESSION (Right)  REPAIR, ROTATOR CUFF, ARTHROSCOPIC    Anesthesia: General    Operative Findings: see op note    Estimated Blood Loss: * No values recorded between 8/28/2024  2:17 PM and 8/28/2024  4:33 PM *         Specimens:   Specimen (24h ago, onward)      None              Discharge Note    OUTCOME: Patient tolerated treatment/procedure well without complication and is now ready for discharge.    DISPOSITION: Home or Self Care    FINAL DIAGNOSIS:    Problem List Items Addressed This Visit    None  Visit Diagnoses       Right rotator cuff tear    -  Primary    Relevant Orders    Diet general    Call MD for:  temperature >100.4    Call MD for:  persistent nausea and vomiting    Call MD for:  severe uncontrolled pain    Call MD for:  difficulty breathing, headache or visual disturbances    Call MD for:  redness, tenderness, or signs of infection (pain, swelling, redness, odor or green/yellow discharge around incision site)    Call MD for:  hives    Call MD for:  persistent dizziness or light-headedness    Call MD for:  extreme fatigue    Pain         Relevant Medications    midazolam injection 0.5-4 mg    fentaNYL 50 mcg/mL injection  mcg    celecoxib capsule 400 mg (Completed)    acetaminophen tablet 1,000 mg (Completed)    ropivacaine 0.2% Perineural Pump infusion 500 ML    Other Relevant Orders    Pulse Oximetry Q4H    Nursing to confirm two phone numbers for patient contact    Patient and caregiver given teaching instructions on home catheter    Call [facility]              FOLLOWUP: In clinic    DISCHARGE INSTRUCTIONS:    Discharge Procedure Orders   Diet general     Call MD for:  temperature >100.4     Call MD for:  persistent nausea and vomiting     Call MD for:  severe uncontrolled pain     Call MD for:  difficulty breathing, headache or visual disturbances     Call MD for:  redness, tenderness, or signs of infection (pain, swelling, redness, odor or green/yellow discharge around incision site)     Call MD for:  hives     Call MD for:  persistent dizziness or light-headedness     Call MD for:  extreme fatigue

## 2024-08-28 NOTE — DISCHARGE INSTRUCTIONS
[address], [place]                                    [phone]             Postoperative Instructions for Shoulder Surgery               Your Surgery Included:   Open              Arthroscopic [] Instability Repair     [] Diagnostic   [] Rotator Cuff Repair     [] Lysis of Adhesions / Manipulation [] Distal Clavicle Resection    [] Debridement [] Biceps Tenodesis       [] Labrum  [] Rotator Cuff   [] Cartilage   [] Contracture Release    [] SLAP Repair   [] Fracture Fixation    [] Instability Repair  [] AC Joint Reconstruction      [x] Rotator Cuff Repair [] Joint Replacement     [x] Subacromial Decompression / Bursectomy   [] Hemiarthroplasty  [] Total Shoulder    [x] Biceps Tenotomy / Tenodesis    [] Reverse Total Shoulder       [] Distal Clavicle Resection          [] Amniox Arthrocentesis    [] Contracture Release                 Call our office ([phone]) immediately if you experience any of the following:      Excessive bleeding or pus like drainage at the incision site      Uncontrollable pain not relieved by pain medication      Excessive swelling or redness at the incision site      Fever above 101.5 degrees not controlled with Tylenol or Motrin      Shortness of Breath      Any foul odor or blistering from the surgery site   FOR EMERGENCIES: If any unusual problems or difficulties occur, call our office at [phone], or page the  at [phone] who will direct your call appropriately   1.   Pain Management: A cold therapy cuff, pain medications, local injections, and in some cases, regional anesthesia injections are used to manage your post-operative pain. The decision to use each of these options is based on their risks and benefits.    Medications: You were given one or more of the following medication prescriptions before leaving the hospital. Have the prescriptions filled at a pharmacy on your way home and follow the instructions on the  bottles. If you need a refill, please call your pharmacy.     Narcotic Medication (usually Vicodin ES, Lortab, Percocet or Nucynta): Begin taking the medication before your shoulder starts to hurt. Some patients do not like to take any medication, but if you wait until your pain is severe before taking, you will be very uncomfortable for several hours waiting for the narcotic to work. Always take with food.    Nausea / Vomiting: For this issue, we prescribe Phenergan, use this medication as directed.    Cold Therapy: You may have been sent home with a Tran[employer] cold therapy unit and wrap for your shoulder. Fill with ice and water to the indicated fill line and use throughout the day for the first two days and then as needed to help relieve pain and control swelling.     Regional Anesthesia Injections (Blocks): You may have been given a regional nerve block either before or after surgery. This may make your entire shoulder numb for 24-36 hours.                    2.   Diet: Eat a bland diet for the first day after surgery. Progress your diet as tolerated. Constipation may occur with Narcotic usage, contact our office if you are experiencing constipation.   3.   Activity: After you arrive at home, spend most of the first 24 hours resting in bed, on the couch, or in a reclining chair. After the first 24 hours at home, slowly increase your activity level based on your symptoms.   4.   Dressing Change: Remove the dressing on the 3rd day. It is normal for some blood to be seen on the dressings. It is also normal for you to see apparent bruising on the skin around your shoulder when you remove the dressing. If present, leave the steri-strip tape across the incisions. If you are concerned by the drainage or the appearance of your shoulder, please call one of the numbers listed below.   5.   Showering: You may shower on the 3rd day after surgery with waterproof bandages over small incisions. If you have an incision with  Prineo (clear mesh), it does not need to be covered. Do not submerge in any water until after your postoperative appointment in clinic.   6.   Shoulder Sling (with/without Pillow attachment): You may have been sent home with a sling / pillow attachment holding your arm away from your body. You may remove the sling when changing clothes or bathing. Make sure to wear the sling while sleeping unless instructed otherwise. You may remove at rest or for exercises.           [x] You need to wear the sling with pillow for 24 hours a day for 6 weeks.   7.  Shoulder Exercises: Begin these exercises the first day after surgery in order to help you regain your motion and strength. You may do the following marked exercises for 2-5 mins five times a day:   [] Shoulder shrugs - Shrug your shoulders up and down.   [] Pendulums - Bend forward allowing your arm to hang down in front of you. Gently swing your arm side-to-side and front to back.                                                                                                                               [] Passive abduction - Have a family member gently lift your arm away from your body bringing your elbow up to the level of your shoulder.                                                                                                                   [] Shoulder rotation - With your arm at your side, have a family member gently rotate your arm internally and externally.                                                                                                                  [] Scapular retractions - (Squeeze shoulder blades together): Squeeze shoulder blades together while slightly pulling them down (do not shrug your shoulders upward); You can perform 10-15 reps, several times throughout the day, when seated at your desk, driving in the car, etc.                                                                                                                      [] Pulley exercises - Put a towel or long sleeve shirt over the top of a door. Stand facing the door. Use your good arm to gently pull your operative arm up in front of you.   [x] Elbow motion - Straighten and bend your elbow.                                                                                                               [x] Ball squeezes - use ball attached to sling/pillow or soft (nerf) ball for  strengthening                                                                                                                 8.  Physical Therapy: Physical therapy is an essential component to your recovery from surgery. Your physical therapy will start in 3 days.   FIRST POSTOPERATIVE VISIT: As scheduled.

## 2024-08-28 NOTE — PLAN OF CARE
VSS.  Patient tolerating oral liquids without difficulty.   No apparent s&s of distress noted at this time, no complaints voiced at this time.   Discharge instructions reviewed with patient/family/friend with good verbal feedback received.   Post op medications bedside delivery, DEIDRE [email or web address].  Patient ready for discharge.

## 2024-08-29 NOTE — ANESTHESIA POST-OP PAIN MANAGEMENT
"Acute Pain Service Progress Note    8/29/2024 1446    Patient contacted regarding CADD infusion follow up. Reports that pain has been well controlled with the infusion pump. Denies signs of local anesthetic toxicity. PNC dressing remains clean, dry, and intact. All questions answered. Reminded patient that the infusion should be discontinued and PNC removed whenever the "infusion complete" alert is seen on the display screen or by POD 5 (9/2/24) at 12pm, whichever comes first. Encouraged patient to call the CADD 24/7 support line at [phone] or the [facility] line at [phone] for any CADD related questions/issues. Verbalizes understanding.              "

## 2024-08-30 ENCOUNTER — TELEPHONE (OUTPATIENT)
Dept: SPORTS MEDICINE | Facility: CLINIC | Age: 49
End: 2024-08-30
Payer: COMMERCIAL

## 2024-08-30 NOTE — TELEPHONE ENCOUNTER
Spoke to patient about questions below.   Patient stated understanding.     ----- Message from Tati Gomes sent at 8/30/2024  9:33 AM CDT -----  Pt calling to see when she starts her tens machine and when can she take off her compression     socks , make sure she doing right exercises    Confirmed patient's contact info below:  Contact Name: Skye Marte  Phone Number: [phone]

## 2024-09-01 ENCOUNTER — PATIENT MESSAGE (OUTPATIENT)
Dept: SPORTS MEDICINE | Facility: CLINIC | Age: 49
End: 2024-09-01
Payer: COMMERCIAL

## 2024-09-01 ENCOUNTER — NURSE TRIAGE (OUTPATIENT)
Dept: ADMINISTRATIVE | Facility: CLINIC | Age: 49
End: 2024-09-01
Payer: COMMERCIAL

## 2024-09-01 NOTE — TELEPHONE ENCOUNTER
"Spoke with pt seems to be upset. She reports that she took bandage off today and reports concern at surgical site. Pt had rotator cuff surgery 8/28. Pt did send picture through [employer] portal. Denies drainage to site. Reports there look to be "meat" that can be seen sticking out of site.Denies fever. Advised will reach out to on call provider    Ortho paged 5:54    Ortho paged 6:09    6:12 Spoke with ortho, and gave MRN of pt. States will call triage nurse back    Spoke with Dr. Leigh who states that site look normal. She states that piece look like your skin, but should go down with time. Advised to keep sites covered, can bath but so not get incisions or dressings wet. Monitor for purulent drainage. Light bloody discharge can be normal if seen. Should wear DAISY hose as much as possible. Will give further instruction during post op visit.    Pt had question regarding bandages to use    6:43 secure chat message sent    6:46 ortho paged.    Dr. Calderon states the use of gauze and tape is fine, main concern is keeping are clean.    Pt informed, and verbalized understanding        Reason for Disposition   [1] Caller has URGENT question AND [2] triager unable to answer question    Additional Information   Negative: [1] Major abdominal surgical incision AND [2] wound gaping open AND [3] visible internal organs   Negative: Sounds like a life-threatening emergency to the triager   Negative: [1] Bleeding from incision AND [2] won't stop after 10 minutes of direct pressure   Negative: [1] Bleeding (more than a few drops) from incision AND [2] tracheostomy or blood vessel surgery (e.g., carotid endarterectomy, femoral bypass graft, kidney dialysis fistula)   Negative: [1] Widespread rash AND [2] bright red, sunburn-like   Negative: Severe pain in the incision   Negative: [1] Incision gaping open AND [2] < 48 hours since wound re-opened   Negative: [1] Incision gaping open AND [2] length of opening > 2 inches (5 cm)   Negative: " Patient sounds very sick or weak to the triager   Negative: Sounds like a serious complication to the triager   Negative: Fever > 100.4 F (38.0 C)   Negative: [1] Incision looks infected (e.g., spreading redness, pus) AND [2] fever > 99.5 F (37.5 C)   Negative: [1] Incision looks infected (e.g., spreading redness, pus) AND [2] large red area (> 2 in. or 5 cm)   Negative: [1] Incision looks infected (e.g., spreading redness, pus) AND [2] face wound   Negative: [1] Red streak runs from the incision AND [2] longer than 1 inch (2.5 cm)   Negative: [1] Pus or bad-smelling fluid draining from incision AND [2] no fever   Negative: [1] Post-op pain AND [2] not controlled with pain medications   Negative: Dressing soaked with blood or body fluid (e.g., drainage)   Negative: [1] Scant bleeding (e.g., few drops) from incision AND [2] tracheostomy or blood vessel surgery (e.g., carotid endarterectomy, femoral bypass graft, kidney dialysis fistula)   Negative: [1] Raised bruise and [2] size > 2 inches (5 cm) and expanding    Protocols used: Post-Op Incision Symptoms and [id number]

## 2024-09-03 ENCOUNTER — CLINICAL SUPPORT (OUTPATIENT)
Dept: REHABILITATION | Facility: HOSPITAL | Age: 49
End: 2024-09-03
Attending: ORTHOPAEDIC SURGERY
Payer: COMMERCIAL

## 2024-09-03 ENCOUNTER — PATIENT MESSAGE (OUTPATIENT)
Dept: REHABILITATION | Facility: HOSPITAL | Age: 49
End: 2024-09-03

## 2024-09-03 DIAGNOSIS — M75.101 TEAR OF RIGHT ROTATOR CUFF, UNSPECIFIED TEAR EXTENT, UNSPECIFIED WHETHER TRAUMATIC: ICD-10-CM

## 2024-09-03 DIAGNOSIS — R29.3 POSTURE ABNORMALITY: Primary | ICD-10-CM

## 2024-09-03 DIAGNOSIS — M25.611 DECREASED RANGE OF MOTION OF RIGHT SHOULDER: ICD-10-CM

## 2024-09-03 DIAGNOSIS — R29.898 UPPER EXTREMITY WEAKNESS: ICD-10-CM

## 2024-09-03 DIAGNOSIS — M75.21 BICEPS TENDINITIS OF RIGHT UPPER EXTREMITY: ICD-10-CM

## 2024-09-03 DIAGNOSIS — M19.011 ARTHRITIS OF RIGHT ACROMIOCLAVICULAR JOINT: ICD-10-CM

## 2024-09-03 PROCEDURE — 97110 THERAPEUTIC EXERCISES: CPT

## 2024-09-03 PROCEDURE — 97161 PT EVAL LOW COMPLEX 20 MIN: CPT

## 2024-09-03 NOTE — PLAN OF CARE
[facility]  Physical Therapy Initial Evaluation    Name: Skye Marte  Clinic Number: [id number]    Therapy Diagnosis:   Encounter Diagnoses   Name Primary?    Arthritis of right acromioclavicular joint     Biceps tendinitis of right upper extremity     Tear of right rotator cuff, unspecified tear extent, unspecified whether traumatic     Posture abnormality Yes    Decreased range of motion of right shoulder     Upper extremity weakness      Physician: Isidro Paredes MD    Physician Orders: PT Eval and Treat  Medical Diagnosis from Referral:   M19.011 (ICD-10-CM) - Arthritis of right acromioclavicular joint   M75.21 (ICD-10-CM) - Biceps tendinitis of right upper extremity   M75.101 (ICD-10-CM) - Tear of right rotator cuff, unspecified tear extent, unspecified whether traumatic   Evaluation Date: 9/3/2024  Authorization Period Expiration: 12/31/2024  Plan of Care Expiration: 11/26/2024  Progress Note Due: 10/01/2024  Visit # / Visits authorized: 1/ 1  FOTO: #1 not given at eval, will give at 1st follow up    Time In: 1000 am  Time Out: 1100 am  Total Billable Time: 60 minutes    DOS: 08/28/2024  S/p:   1. Right shoulder arthroscopic rotator cuff repair.   2. Right shoulder open subpectoral biceps tenodesis  3. Right shoulder arthroscopic distal clavicle excision  4. Right shoulder arthroscopic subacromial decompression.   5. Right shoulder arthroscopic debridement labrum  Post-Op Precautions: Follow medium size rotator cuff repair       Precautions: Standard and post-op    Subjective     Date of onset: 08/28/2024  History of current condition - Skye reports: approx 1 year ago she began having increased R shoulder pain while working out. This became more intense following a FOOSH injury she had while jogging. She attempted conservative management for 7 months with minimal improvement. She underwent the above procedure on 8/28 and presents today with questions regarding sling  management and bandaging. Reports she wasn't clear about covering incisions with gauze or waterproof bandaging so she has taped it with gauze and avoided getting it in the shower     Imaging MRI: near full thickness tear of the supraspinatus, biceps tendinitis, SLAP     Prior Falls: none since injur     Prior Therapy: PT for 7 months prior to sx  Exercise Routine/Sport Participation: works on house projects/yardwork, group workout class 4x/wk, swimming 2x/wk  Social History: lives with boyfriend   Occupation: orthodontist  Prior Level of Function: no limitations in ADLs or fitness  Current Level of Function: unable to utilize R UE 2/2 sling precautions    Pain:  Current 1/10, worst 4/10, best 1/10   Location: right shoulder  Description: Aching, Dull, and Tight  Aggravating Factors: Night Time  Easing Factors: pain medication, ice, TENS unit, and rest    Pts goals: to have full use of UE again      Medical History:   Past Medical History:   Diagnosis Date    Hypothyroidism     Thyroid nodule        Surgical History:   Skye Marte  has a past surgical history that includes Tonsillectomy; Colonoscopy; Arthroscopic debridement of rotator cuff (Right, 8/28/2024); Arthroscopy of shoulder with removal of distal clavicle (Right, 8/28/2024); tenodesis, biceps, open (Right, 8/28/2024); Arthroscopy of shoulder with decompression of subacromial space (Right, 8/28/2024); and Arthroscopic repair of rotator cuff of shoulder (8/28/2024).    Medications:   Skye has a current medication list which includes the following prescription(s): aspirin, bifidobacterium animalis, calcium/folic ac/multivit-min, cetirizine, estradiol, ferrous sulfate, hydroxychloroquine, hydroxyzine pamoate, levocetirizine, levothyroxine, omeprazole, oxycodone-acetaminophen, prednisolone acetate, promethazine, tramadol, triamcinolone acetonide 0.1%, turmeric-ging-olive-oreg-capry, vitamin d, and zinc sulfate.    Allergies:   Review of patient's  "allergies indicates:  No Known Allergies     Objective     Observation: sling donned but malpositioned    Posture: R shoulder depressed, scapula anteriorly tilted and internally rotated       Shoulder Passive Range of Motion within Protcol Limits:    Right Left   Flexion   80 deg 180 deg   ER at 0   NT 60 deg   ER at 90   NT 95 deg   IR   NT 50 deg     Elbow Active Range of Motion within Protcol Limits   Right Left   Flexion   120 deg 135 deg   Extension   0 deg 5 deg hyper     Cervical Active Range of Motion: WFL    Wrist Active Range of Motion : WFL      Palpation: TTP at and around incision sites    Sensation: Intact    Pulses: Intact     Special Tests: Not performed today due to post-op status     Strength: Not assessed today due to post-op status.     Joint Mobility: Not assessed today due to post-op status.        CMS Impairment/Limitation/Restriction for FOTO Shoulder Survey    Therapist reviewed FOTO scores for Skye Marte on 9/3/2024.   FOTO documents entered into [employer] - see Media section.    Limitation Score: will give at 1st follow up       Treatment     Treatment Time In: 1035 am  Treatment Time Out: 1100 am  Total Treatment time separate from Evaluation: 25 minutes    Skye received the following:     Therapeutic exercises to develop strength, endurance, ROM, flexibility, and posture for 25 minutes including:  Scap retractions 20 x 5"  Pendulum hang; 2 x 30"  AA Elbow Flex/Ext; 3 x 10  Wrist flex/ext 3 x 10  Wrist RD/UD 3 x 10    Home Exercises and Patient Education Provided     Education provided:   - Sling wear and lifting restrictions were reviewed  -  Pt was educated and demonstrated good understanding of post-op precautions, timeframe for recovery, prognosis, objective findings and progress/goals, Tx rationale/options, HEP review/discussion on modification and or progression/regression, expectations for rehab, normal and expected soreness, activity modification/avoidance/pacing, use of " ICE/elevation of extremity, activity pacing, anatomy/physiology of pathology, benefits of exercise and physical therapy, use/fit of sling, wound care, s/s infection and DVT.     - Prognosis, activity modification, goals for therapy, role of therapy for care, exercises/HEP    Written Home Exercises Provided: yes.  Exercises were reviewed and Skye was able to demonstrate them prior to the end of the session.   Pt received a written copy of exercises to perform at home. Skye demonstrated good  understanding of the education provided.     See EMR under patient instructions for exercises given.     Assessment     Skye is a 49 y.o. female referred to outpatient Physical Therapy s/p R medium rotator cuff repair on 08/28/2024. The patient demonstrates normal post operative restrictions in shoulder AROM/PROM, underlying strength deficits, pain, difficulty sleeping and decreased ability to independently complete ADLs and IADLs decreasing quality. Pt will benefit from skilled outpatient Physical Therapy to address the deficits stated above and in the chart below, provide pt/family education, and to maximize pt's level of independence. Pt prognosis is Good.     Plan of care discussed with patient: Yes  Pt's spiritual, cultural and educational needs considered and patient is agreeable to the plan of care and goals as stated below:       Anticipated Barriers for therapy: none    Medical Necessity is demonstrated by the following  History  Co-morbidities and personal factors that may impact the plan of care [x] LOW: no personal factors / co-morbidities  [] MODERATE: 1-2 personal factors / co-morbidities  [] HIGH: 3+ personal factors / co-morbidities    Moderate / High Support Documentation:   Co-morbidities affecting plan of care: none    Personal Factors:   no deficits     Examination  Body Structures and Functions, activity limitations and participation restrictions that may impact the plan of care [] LOW: addressing 1-2  elements  [] MODERATE: 3+ elements  [x] HIGH: 4+ elements (please support below)    Moderate / High Support Documentation: posture, range of motion, strength, joint mobility, motor control     Clinical Presentation [x] LOW: stable  [] MODERATE: Evolving  [] HIGH: Unstable     Decision Making/ Complexity Score: low       Goals:  Short Term Goals: 6 weeks  1. Pt will be compliant with HEP 50% of prescribed amount.   2. The pt to demo improvement in R shoulder PROM to by 80% of the L  3.  The pt to demo tolerance to being out of the sling for 24 hours with pain <2/10     Long Term Goals: 24 weeks   Pt will be compliant with % of prescribed amount.   Pt will score 20% improvement on FOTO Shoulder Mobility   The pt to improvement in AROM of R shoulder within 80% of L shoulder to improve tolerance to OH movement   The pt to  Demo at least 4+/5 of RTC muscle testing to demo improvement in tolerance to activity  The pt to tolerate lifting 10# overhead to improve tolerance to ADLs and work related activities   The pt will report full participation in ADLs and IADLs without restrictions related to R Shoulder.      Plan   Plan of care Certification: 9/3/2024 to 11/26/2024.    Outpatient Physical Therapy 2 times weekly for 12 weeks to include the following interventions: Manual Therapy, Moist Heat/ Ice, Neuromuscular Re-ed, Patient Education, Therapeutic Activites, and Therapeutic Exercise.     Lelo Everett, PT , DPT, SCS

## 2024-09-10 ENCOUNTER — OFFICE VISIT (OUTPATIENT)
Dept: SPORTS MEDICINE | Facility: CLINIC | Age: 49
End: 2024-09-10
Payer: COMMERCIAL

## 2024-09-10 DIAGNOSIS — Z09 SURGERY FOLLOW-UP EXAMINATION: Primary | ICD-10-CM

## 2024-09-10 DIAGNOSIS — Z98.890 S/P ROTATOR CUFF REPAIR: ICD-10-CM

## 2024-09-10 PROCEDURE — 1160F RVW MEDS BY RX/DR IN RCRD: CPT | Mod: CPTII,S$GLB,, | Performed by: PHYSICIAN ASSISTANT

## 2024-09-10 PROCEDURE — 99999 PR PBB SHADOW E&M-EST. PATIENT-LVL III: CPT | Mod: PBBFAC,,, | Performed by: PHYSICIAN ASSISTANT

## 2024-09-10 PROCEDURE — 1159F MED LIST DOCD IN RCRD: CPT | Mod: CPTII,S$GLB,, | Performed by: PHYSICIAN ASSISTANT

## 2024-09-10 PROCEDURE — 99024 POSTOP FOLLOW-UP VISIT: CPT | Mod: S$GLB,,, | Performed by: PHYSICIAN ASSISTANT

## 2024-09-10 NOTE — PROGRESS NOTES
CC: Right shoulder post op 2 weeks    Patient is here for her 2 week post op appointment s/p below and is doing well. Patient is doing PT at [facility] and is progressing as expected. Patient is taking pain medication on occasion as needed after doing her home exercises. she denies any chest pain, SOB, fevers, chills, nausea, vomiting, or drainage from incision sites.     DATE OF SURGERY:  8/28/2024      PREOPERATIVE DIAGNOSES:   1. Right shoulder rotator cuff tear.   2. Right shoulder biceps tendinopathy  3. Right shoulder AC joint arthritis  4. Right shoulder labral tear     POSTOPERATIVE DIAGNOSES:   1. Right shoulder rotator cuff tear.   2. Right shoulder biceps tendinopathy  3. Right shoulder AC joint arthritis  4. Right shoulder labral tear     PROCEDURE:   1. Right shoulder arthroscopic rotator cuff repair.   2. Right shoulder open subpectoral biceps tenodesis  3. Right shoulder arthroscopic distal clavicle excision  4. Right shoulder arthroscopic subacromial decompression.   5. Right shoulder arthroscopic debridement labrum     SURGEON: Isidro Paredes M.D.     Pain well tolerated on pain medication  Sling in place  No issues reported    Review of Systems   Constitution: Negative. Negative for chills, fever and night sweats.    Cardiovascular: Negative for chest pain and syncope.   Respiratory: Negative for cough and shortness of breath.    Gastrointestinal: Negative for abdominal pain and bowel incontinence.      PE:    LMP 11/28/2019      Right shoulder    Incision healed  No sign of infection  Swelling resolved  Compartments soft  Neurovascular status intact in extremity    PROM  Forward elevation 65 degrees  External rotation 5 degrees    Assessment:  2 weeks s/p right shoulder arthroscopic rotator cuff repair, subacromial decompression, distal clavicle excision, labral debridement, open subpectoral biceps tenodesis    Plan:  1. Continue PT   2. Pain medication as needed for PT; try to wean off  for next visit  3. Return to clinic in 4 weeks for 6 week post op follow up      All questions were answered. Instructed patient to call with questions or concerns in the interim.       Medical Dictation software was used during the dictation of portions or the entirety of this medical record.  Phonetic or grammatic errors may exist due to the use of this software. For clarification, refer to the author of the document.

## 2024-09-12 ENCOUNTER — CLINICAL SUPPORT (OUTPATIENT)
Dept: REHABILITATION | Facility: HOSPITAL | Age: 49
End: 2024-09-12
Attending: ORTHOPAEDIC SURGERY
Payer: COMMERCIAL

## 2024-09-12 DIAGNOSIS — M25.611 DECREASED RANGE OF MOTION OF RIGHT SHOULDER: ICD-10-CM

## 2024-09-12 DIAGNOSIS — R29.3 POSTURE ABNORMALITY: Primary | ICD-10-CM

## 2024-09-12 DIAGNOSIS — R29.898 UPPER EXTREMITY WEAKNESS: ICD-10-CM

## 2024-09-12 PROCEDURE — 97110 THERAPEUTIC EXERCISES: CPT

## 2024-09-12 PROCEDURE — 97112 NEUROMUSCULAR REEDUCATION: CPT

## 2024-09-12 PROCEDURE — 97140 MANUAL THERAPY 1/> REGIONS: CPT

## 2024-09-12 NOTE — PROGRESS NOTES
[facility]   Physical Therapy Treatment Note     Name: Skye Marte  [facility] Number: [id number]    Therapy Diagnosis:   Encounter Diagnoses   Name Primary?    Posture abnormality Yes    Decreased range of motion of right shoulder     Upper extremity weakness      Physician: Isidro Paredes MD    Visit Date: 9/12/2024  Physician Orders: PT Eval and Treat  Medical Diagnosis from Referral:   M19.011 (ICD-10-CM) - Arthritis of right acromioclavicular joint   M75.21 (ICD-10-CM) - Biceps tendinitis of right upper extremity   M75.101 (ICD-10-CM) - Tear of right rotator cuff, unspecified tear extent, unspecified whether traumatic   Evaluation Date: 9/3/2024  Authorization Period Expiration: 12/31/2024  Plan of Care Expiration: 11/26/2024  Progress Note Due: 10/01/2024  Visit # / Visits authorized: 1/ 20  FOTO: #1 not given at eval, will give at 1st follow up     Time In: 0500 pm  Time Out: 0600 pm  Total Billable Time: 60 minutes     DOS: 08/28/2024  S/p:   1. Right shoulder arthroscopic rotator cuff repair.   2. Right shoulder open subpectoral biceps tenodesis  3. Right shoulder arthroscopic distal clavicle excision  4. Right shoulder arthroscopic subacromial decompression.   5. Right shoulder arthroscopic debridement labrum  Post-Op Precautions: Follow medium size rotator cuff repair         Precautions: Standard and post-op    SUBJECTIVE     Pt reports: she had to rearrange several pillows in her bed in order get sleep, but has been able to sleep the last few nights.  She was compliant with home exercise program.  Response to previous treatment: improved sling management  Functional change: improved sling management    Pain: 2/10  Location: right shoulder      OBJECTIVE     Objective Measures updated at progress report unless specified.     2 weeks and 2 day s/p as of 9/12    Shoulder Passive Range of Motion within Protcol Limits:     Right Left   Flexion    90 deg 180 deg   ER at 0    " 15 deg 60 deg   ER at 90    NT 95 deg   IR    NT 50 deg       Treatment     Skye received the treatments listed below:      THERAPEUTIC EXERCISES to develop strength, endurance, ROM, flexibility, posture, and core stabilization for 20 minutes including:  Pendulum hang; 2 x 2 min   AA Elbow flex/ext; 20x  Wrist flex/ext; 20x  Pt education x 10 min     MANUAL THERAPY TECHNIQUES including Joint mobilizations and Soft tissue Mobilization were applied to R shoulder for 25 minutes.  PROM within protocol limits  GH oscillations/ grade 1-2  for pain control   Elbow flex/ext PROM    NEUROMUSCULAR RE-EDUCATION ACTIVITIES to improve Balance, Coordination, Kinesthetic, Sense, Proprioception, and Posture for 15 minutes.  The following were included:   Scap retraction (in sling); 20 x 5"   Shrugs (in sling); 20 x 5"       THERAPEUTIC ACTIVITIES to improve dynamic and functional performance for 00 minutes including.      Patient Education and Home Exercises     Home Exercises Provided and Patient Education Provided     Education provided:   - continue HEP    Written Home Exercises Provided: Patient instructed to cont prior HEP. Exercises were reviewed and Skye was able to demonstrate them prior to the end of the session.  Skye demonstrated good understanding of the education provided. See EMR under Patient Instructions for exercises provided during therapy sessions    ASSESSMENT     Skye presented with appropriate range of motion for this stage post-operatively. Increased guarding 2/2 fear avoidance which improved with manual interventions and pt education on normal outcomes and expectation following RCR sx. She was educated on importance of good scapular mobility in shrugs and scap retractions, pt demonstrated significantly improved technique following sling donning 2/2 decreased apprehension. Will continue to progress and monitor stiffness, will adjust frequency as needed pending range.     Skye is progressing well " towards her goals.   Pt prognosis is Good.     Pt will continue to benefit from skilled outpatient physical therapy to address the deficits listed in the problem list box on initial evaluation, provide pt/family education and to maximize pt's level of independence in the home and community environment.     Pt's spiritual, cultural and educational needs considered and pt agreeable to plan of care and goals.     Anticipated barriers to physical therapy: none    Goals:   Short Term Goals: 6 weeks  1. Pt will be compliant with HEP 50% of prescribed amount. (Progressing, not met)  2. The pt to demo improvement in R shoulder PROM to by 80% of the L (Progressing, not met)  3.  The pt to demo tolerance to being out of the sling for 24 hours with pain <2/10 (Progressing, not met)     Long Term Goals: 24 weeks   Pt will be compliant with % of prescribed amount.  (Progressing, not met)  Pt will score 20% improvement on FOTO Shoulder Mobility  (Progressing, not met)  The pt to improvement in AROM of R shoulder within 80% of L shoulder to improve tolerance to OH movement  (Progressing, not met)  The pt to  Demo at least 4+/5 of RTC muscle testing to demo improvement in tolerance to activity (Progressing, not met)  The pt to tolerate lifting 10# overhead to improve tolerance to ADLs and work related activities  (Progressing, not met)  The pt will report full participation in ADLs and IADLs without restrictions related to R Shoulder.   (Progressing, not met)    PLAN     Progress PROM per protocol    Lelo Evreett, PT, DPT, SCS

## 2024-09-13 ENCOUNTER — TELEPHONE (OUTPATIENT)
Dept: SPORTS MEDICINE | Facility: CLINIC | Age: 49
End: 2024-09-13
Payer: COMMERCIAL

## 2024-09-13 NOTE — TELEPHONE ENCOUNTER
spoke to patient and let her know the form will be ready for  this afternoon----- Message from Barbie Mancini MA sent at 9/13/2024 12:11 PM CDT -----  Regarding: Handicap sticker  Contact: pt [phone]  Type:  Needs Medical Advice    Who Called: Skye is calling for a handicap sticker  she is not sure if she have to bring the  form or if it is in the office   Would the patient rather a call back or a response via [employer]?  Both   Best Call Back Number: pt [phone]   Additional Information:

## 2024-09-20 ENCOUNTER — CLINICAL SUPPORT (OUTPATIENT)
Dept: REHABILITATION | Facility: HOSPITAL | Age: 49
End: 2024-09-20
Payer: COMMERCIAL

## 2024-09-20 DIAGNOSIS — M25.611 DECREASED RANGE OF MOTION OF RIGHT SHOULDER: ICD-10-CM

## 2024-09-20 DIAGNOSIS — R29.3 POSTURE ABNORMALITY: Primary | ICD-10-CM

## 2024-09-20 DIAGNOSIS — R29.898 UPPER EXTREMITY WEAKNESS: ICD-10-CM

## 2024-09-20 PROCEDURE — 97112 NEUROMUSCULAR REEDUCATION: CPT | Mod: CQ

## 2024-09-20 PROCEDURE — 97110 THERAPEUTIC EXERCISES: CPT | Mod: CQ

## 2024-09-20 PROCEDURE — 97140 MANUAL THERAPY 1/> REGIONS: CPT | Mod: CQ

## 2024-09-20 NOTE — PROGRESS NOTES
[facility]   Physical Therapy Treatment Note     Name: Skye Marte  [facility] Number: [id number]    Therapy Diagnosis:   Encounter Diagnoses   Name Primary?    Posture abnormality Yes    Decreased range of motion of right shoulder     Upper extremity weakness      Physician: Isidro Paredes MD    Visit Date: 9/20/2024  Physician Orders: PT Eval and Treat  Medical Diagnosis from Referral:   M19.011 (ICD-10-CM) - Arthritis of right acromioclavicular joint   M75.21 (ICD-10-CM) - Biceps tendinitis of right upper extremity   M75.101 (ICD-10-CM) - Tear of right rotator cuff, unspecified tear extent, unspecified whether traumatic   Evaluation Date: 9/3/2024  Authorization Period Expiration: 12/31/2024  Plan of Care Expiration: 11/26/2024  Progress Note Due: 10/01/2024  Visit # / Visits authorized: 2/20  FOTO: 1/3     Time In: 1110  Time Out: 1144  Total Billable Time: 32 minutes    FOTO IE 9/12: 30  FOTO 2:   FOTO 3:   FOTO goal: 63     Procedure by Reggie: 08/28/2024  1. Right shoulder arthroscopic rotator cuff repair.   2. Right shoulder open subpectoral biceps tenodesis  3. Right shoulder arthroscopic distal clavicle excision  4. Right shoulder arthroscopic subacromial decompression.   5. Right shoulder arthroscopic debridement labrum  Post-Op Precautions: Follow medium size rotator cuff repair      Precautions: Standard and post-op    SUBJECTIVE     Pt reports: overall her pain is relatively controlled. Reports consistent compliance with HEP. Presents in sling.     She was compliant with home exercise program.  Response to previous treatment: improved sling management  Functional change: improved sling management    Pain: 1/10  Location: right shoulder      OBJECTIVE     DOS: 8/28/24  POD: 3 weeks and 2 day s/p as of 9/20    Objective Measures updated at progress report unless specified.     Shoulder Passive Range of Motion: 9/20    Right Left   Flexion    90 deg 180 deg   ER at  "0    15 deg 60 deg   ER at 90    NT 95 deg   IR    NT 50 deg     Treatment     Skye received the treatments listed below:      THERAPEUTIC EXERCISES to develop strength, endurance, ROM, flexibility, posture, and core stabilization for 12 minutes including:    Pendulum hang 3 x 30"   Elbow flex AROM x 20  Wrist flex/ext x 30 each  Pt education: HEP compliance, use of sling x 6 weeks     MANUAL THERAPY TECHNIQUES including Joint mobilizations and Soft tissue Mobilization were applied to R shoulder for 12 minutes.    Assessment of shldr ROM  GH oscillations/ grade 1-2  for pain/guarding  Inferior GH mob (gr II)  PROM within protocol limits  Elbow flex/ext PROM    NEUROMUSCULAR RE-EDUCATION ACTIVITIES to improve Balance, Coordination, Kinesthetic, Sense, Proprioception, and Posture for 08 minutes.  The following were included:     Ballet bar:   Scap retraction x 30  Shrugs x 30    THERAPEUTIC ACTIVITIES to improve dynamic and functional performance for 00 minutes including.      Patient Education and Home Exercises     Home Exercises Provided and Patient Education Provided     Education provided:   - continue HEP    Written Home Exercises Provided: Patient instructed to cont prior HEP. Exercises were reviewed and Skye was able to demonstrate them prior to the end of the session.  Skye demonstrated good understanding of the education provided. See EMR under Patient Instructions for exercises provided during therapy sessions    ASSESSMENT     Skye did well today. She presents with sig stiffness in ER with pain/guarding at end range. Pt tolerated scap mobility much better with use of ballet bar for support. Encouraged continued compliance with HEP and use of sling. No adverse effects reported p tx.     Skye is progressing well towards her goals.   Pt prognosis is Good.     Pt will continue to benefit from skilled outpatient physical therapy to address the deficits listed in the problem list box on initial " evaluation, provide pt/family education and to maximize pt's level of independence in the home and community environment.     Pt's spiritual, cultural and educational needs considered and pt agreeable to plan of care and goals.     Anticipated barriers to physical therapy: none    Goals:   Short Term Goals: 6 weeks  1. Pt will be compliant with HEP 50% of prescribed amount. (Progressing, not met)  2. The pt to demo improvement in R shoulder PROM to by 80% of the L (Progressing, not met)  3.  The pt to demo tolerance to being out of the sling for 24 hours with pain <2/10 (Progressing, not met)     Long Term Goals: 24 weeks   Pt will be compliant with % of prescribed amount.  (Progressing, not met)  Pt will score 20% improvement on FOTO Shoulder Mobility  (Progressing, not met)  The pt to improvement in AROM of R shoulder within 80% of L shoulder to improve tolerance to OH movement  (Progressing, not met)  The pt to  Demo at least 4+/5 of RTC muscle testing to demo improvement in tolerance to activity (Progressing, not met)  The pt to tolerate lifting 10# overhead to improve tolerance to ADLs and work related activities  (Progressing, not met)  The pt will report full participation in ADLs and IADLs without restrictions related to R Shoulder.   (Progressing, not met)    PLAN     Progress PROM per protocol    Viridiana Haney PTA

## 2024-09-23 ENCOUNTER — CLINICAL SUPPORT (OUTPATIENT)
Dept: REHABILITATION | Facility: HOSPITAL | Age: 49
End: 2024-09-23
Payer: COMMERCIAL

## 2024-09-23 DIAGNOSIS — M25.611 DECREASED RANGE OF MOTION OF RIGHT SHOULDER: ICD-10-CM

## 2024-09-23 DIAGNOSIS — R29.3 POSTURE ABNORMALITY: Primary | ICD-10-CM

## 2024-09-23 DIAGNOSIS — R29.898 UPPER EXTREMITY WEAKNESS: ICD-10-CM

## 2024-09-23 PROCEDURE — 97112 NEUROMUSCULAR REEDUCATION: CPT

## 2024-09-23 PROCEDURE — 97140 MANUAL THERAPY 1/> REGIONS: CPT

## 2024-09-23 PROCEDURE — 97110 THERAPEUTIC EXERCISES: CPT

## 2024-09-23 NOTE — PROGRESS NOTES
[facility]   Physical Therapy Treatment Note     Name: Skye Marte  [facility] Number: [id number]    Therapy Diagnosis:   Encounter Diagnoses   Name Primary?    Posture abnormality Yes    Decreased range of motion of right shoulder     Upper extremity weakness      Physician: Isidro Paredes MD    Visit Date: 9/23/2024  Physician Orders: PT Eval and Treat  Medical Diagnosis from Referral:   M19.011 (ICD-10-CM) - Arthritis of right acromioclavicular joint   M75.21 (ICD-10-CM) - Biceps tendinitis of right upper extremity   M75.101 (ICD-10-CM) - Tear of right rotator cuff, unspecified tear extent, unspecified whether traumatic   Evaluation Date: 9/3/2024  Authorization Period Expiration: 12/31/2024  Plan of Care Expiration: 11/26/2024  Progress Note Due: 10/01/2024  Visit # / Visits authorized: 3/20  FOTO: 1/3     Time In: 0530 pm  Time Out: 0630 pm  Total Billable Time: 60 minutes    FOTO IE 9/12: 30  FOTO 2:   FOTO 3:   FOTO goal: 63     Procedure by Reggie: 08/28/2024  1. Right shoulder arthroscopic rotator cuff repair.   2. Right shoulder open subpectoral biceps tenodesis  3. Right shoulder arthroscopic distal clavicle excision  4. Right shoulder arthroscopic subacromial decompression.   5. Right shoulder arthroscopic debridement labrum  Post-Op Precautions: Follow medium size rotator cuff repair      Precautions: Standard and post-op    SUBJECTIVE     Pt reports: she had a lizard fall on her and it made her jump, since then her whole shoulder has been very sore    She was compliant with home exercise program.  Response to previous treatment: improved sling management  Functional change: improved sling management    Pain: 1/10  Location: right shoulder      OBJECTIVE     DOS: 8/28/24  POD: 3 weeks and 5 day s/p as of 9/23    Objective Measures updated at progress report unless specified.     Shoulder Passive Range of Motion: 9/20    Right Left   Flexion    93 deg 180 deg   ER  "at 0    28 deg 60 deg   ER at 90    NT 95 deg   IR    NT 50 deg     Treatment     Skye received the treatments listed below:      THERAPEUTIC EXERCISES to develop strength, endurance, ROM, flexibility, posture, and core stabilization for 23 minutes including:  Pendulum hang 3 x 30"   Elbow flex AROM x 20  Table walkback 20x 5"   Elbow ext hand; 3 x 1 min   Supine wand ER at 20 deg abd; 30 x 5"   Pt education: HEP compliance, use of sling x 6 weeks     MANUAL THERAPY TECHNIQUES including Joint mobilizations and Soft tissue Mobilization were applied to R shoulder for 20 minutes.    Assessment of shldr ROM  GH oscillations/ grade 1-2  for pain/guarding  Inferior GH mob (gr II)  PROM within protocol limits  Elbow flex/ext PROM    NEUROMUSCULAR RE-EDUCATION ACTIVITIES to improve Balance, Coordination, Kinesthetic, Sense, Proprioception, and Posture for 17 minutes.  The following were included:     Ballet bar:   Scap retraction x 30  Shrugs x 30  Retro rolls 20 x    THERAPEUTIC ACTIVITIES to improve dynamic and functional performance for 00 minutes including.      Patient Education and Home Exercises     Home Exercises Provided and Patient Education Provided     Education provided:   - continue HEP    Written Home Exercises Provided: Patient instructed to cont prior HEP. Exercises were reviewed and Skye was able to demonstrate them prior to the end of the session.  Skye demonstrated good understanding of the education provided. See EMR under Patient Instructions for exercises provided during therapy sessions    ASSESSMENT     Skye presented with significant guarding and stiffness into inferior glide as well as passive ER. She responded well to gentle glenohumeral oscillations and heavy education on tissue healing, course of rehab, and importance of passive movement. She was initially guarded with ER stretch exercise but improved with repetition. Added wand ER and table walkback to HEP and increased frequency to 2x " per week until out of sling to maintain good joint mobility.     Skye is progressing well towards her goals.   Pt prognosis is Good.     Pt will continue to benefit from skilled outpatient physical therapy to address the deficits listed in the problem list box on initial evaluation, provide pt/family education and to maximize pt's level of independence in the home and community environment.     Pt's spiritual, cultural and educational needs considered and pt agreeable to plan of care and goals.     Anticipated barriers to physical therapy: none    Goals:   Short Term Goals: 6 weeks  1. Pt will be compliant with HEP 50% of prescribed amount. (Progressing, not met)  2. The pt to demo improvement in R shoulder PROM to by 80% of the L (Progressing, not met)  3.  The pt to demo tolerance to being out of the sling for 24 hours with pain <2/10 (Progressing, not met)     Long Term Goals: 24 weeks   Pt will be compliant with % of prescribed amount.  (Progressing, not met)  Pt will score 20% improvement on FOTO Shoulder Mobility  (Progressing, not met)  The pt to improvement in AROM of R shoulder within 80% of L shoulder to improve tolerance to OH movement  (Progressing, not met)  The pt to  Demo at least 4+/5 of RTC muscle testing to demo improvement in tolerance to activity (Progressing, not met)  The pt to tolerate lifting 10# overhead to improve tolerance to ADLs and work related activities  (Progressing, not met)  The pt will report full participation in ADLs and IADLs without restrictions related to R Shoulder.   (Progressing, not met)    PLAN     Progress PROM per protocol    Lelo Everett, PT, DPT, SCS

## 2024-09-27 ENCOUNTER — CLINICAL SUPPORT (OUTPATIENT)
Dept: REHABILITATION | Facility: HOSPITAL | Age: 49
End: 2024-09-27
Payer: COMMERCIAL

## 2024-09-27 DIAGNOSIS — R29.3 POSTURE ABNORMALITY: Primary | ICD-10-CM

## 2024-09-27 DIAGNOSIS — M25.611 DECREASED RANGE OF MOTION OF RIGHT SHOULDER: ICD-10-CM

## 2024-09-27 DIAGNOSIS — R29.898 UPPER EXTREMITY WEAKNESS: ICD-10-CM

## 2024-09-27 PROCEDURE — 97112 NEUROMUSCULAR REEDUCATION: CPT | Mod: CQ

## 2024-09-27 PROCEDURE — 97140 MANUAL THERAPY 1/> REGIONS: CPT | Mod: CQ

## 2024-09-27 PROCEDURE — 97110 THERAPEUTIC EXERCISES: CPT | Mod: CQ

## 2024-09-27 NOTE — PROGRESS NOTES
[facility]   Physical Therapy Treatment Note     Name: Skye Marte  [facility] Number: [id number]    Therapy Diagnosis:   Encounter Diagnoses   Name Primary?    Posture abnormality Yes    Decreased range of motion of right shoulder     Upper extremity weakness      Physician: Isidro Paredes MD    Visit Date: 9/27/2024  Physician Orders: PT Eval and Treat  Medical Diagnosis from Referral:   M19.011 (ICD-10-CM) - Arthritis of right acromioclavicular joint   M75.21 (ICD-10-CM) - Biceps tendinitis of right upper extremity   M75.101 (ICD-10-CM) - Tear of right rotator cuff, unspecified tear extent, unspecified whether traumatic   Evaluation Date: 9/3/2024  Authorization Period Expiration: 12/31/2024  Plan of Care Expiration: 11/26/2024  Progress Note Due: 10/01/2024  Visit # / Visits authorized: 4/20  FOTO: 1/3     Time In: 1105  Time Out: 1205  Total Billable Time: 48 minutes    FOTO IE 9/12: 30  FOTO 2:   FOTO 3:   FOTO goal by #19: 63     Procedure by Reggie: 08/28/2024  1. Right shoulder arthroscopic rotator cuff repair.   2. Right shoulder open subpectoral biceps tenodesis  3. Right shoulder arthroscopic distal clavicle excision  4. Right shoulder arthroscopic subacromial decompression.   5. Right shoulder arthroscopic debridement labrum  Post-Op Precautions: Follow medium size rotator cuff repair      Precautions: Standard and post-op    SUBJECTIVE     Pt reports: she has been working hard on her HEP. Presents in sling.     She was compliant with home exercise program.  Response to previous treatment: improved sling management  Functional change: improved sling management    Pain: 1/10  Location: right shoulder      OBJECTIVE     DOS: 8/28/24  POD: 4 weeks, 2 days as of 9/27    Objective Measures updated at progress report unless specified.     Shoulder Passive Range of Motion: 9/27    Right Left   Flexion    105 deg 180 deg   ER at 0    42 deg 60 deg   ER at 90    NT 95 deg  "  IR    NT 50 deg     Treatment     Skye received the treatments listed below:      THERAPEUTIC EXERCISES to develop strength, endurance, ROM, flexibility, posture, and core stabilization for 28 minutes including:    Pendulum hang 3 x 30"   Flexion walkaway stretch 10 x 10"   Table slides (flx, scaption) x 4' each  Elbow flex AROM x 30  Wrist ext, RD, flx 1# x 30 each  Elbow ext hand; 3 x 1 min - np  Supine wand ER stretch @30deg 5" hold x 3'  Supine AA wand chest press 3 x 8  Pt education: HEP compliance    MANUAL THERAPY TECHNIQUES including Joint mobilizations and Soft tissue Mobilization were applied to R shoulder for 10 minutes.    Assessment of shldr ROM  GH oscillations/ grade 1-2  for pain/guarding  Inferior GH mob (gr II)  PROM within protocol limits  Elbow flex/ext PROM    NEUROMUSCULAR RE-EDUCATION ACTIVITIES to improve Balance, Coordination, Kinesthetic, Sense, Proprioception, and Posture for 10 minutes.  The following were included:     Ballet bar:   Scap retraction x 30  Shrugs x 30  Retro rolls - held d/t discomfort    THERAPEUTIC ACTIVITIES to improve dynamic and functional performance for 00 minutes including.      Patient Education and Home Exercises     Home Exercises Provided and Patient Education Provided     Education provided:   - continue HEP    Written Home Exercises Provided: Patient instructed to cont prior HEP. Exercises were reviewed and Skye was able to demonstrate them prior to the end of the session.  Skye demonstrated good understanding of the education provided. See EMR under Patient Instructions for exercises provided during therapy sessions    ASSESSMENT     Skye did great today. She displays improvements in both flexion and ER PROM with less pain/guarding noted during manual therapy. Good tolerance to initiation of table slides and supine AA dowel chest press. Added table slides to HEP and encouraged continued compliance with HEP for motion recovery. Will assess response " to today's progressed tx and continue as appropriate. No adverse effects reported p tx.     Skye is progressing well towards her goals.   Pt prognosis is Good.     Pt will continue to benefit from skilled outpatient physical therapy to address the deficits listed in the problem list box on initial evaluation, provide pt/family education and to maximize pt's level of independence in the home and community environment.     Pt's spiritual, cultural and educational needs considered and pt agreeable to plan of care and goals.     Anticipated barriers to physical therapy: none    Goals:   Short Term Goals: 6 weeks  1. Pt will be compliant with HEP 50% of prescribed amount. (Progressing, not met)  2. The pt to demo improvement in R shoulder PROM to by 80% of the L (Progressing, not met)  3.  The pt to demo tolerance to being out of the sling for 24 hours with pain <2/10 (Progressing, not met)     Long Term Goals: 24 weeks   Pt will be compliant with % of prescribed amount.  (Progressing, not met)  Pt will score 20% improvement on FOTO Shoulder Mobility  (Progressing, not met)  The pt to improvement in AROM of R shoulder within 80% of L shoulder to improve tolerance to OH movement  (Progressing, not met)  The pt to  Demo at least 4+/5 of RTC muscle testing to demo improvement in tolerance to activity (Progressing, not met)  The pt to tolerate lifting 10# overhead to improve tolerance to ADLs and work related activities  (Progressing, not met)  The pt will report full participation in ADLs and IADLs without restrictions related to R Shoulder.   (Progressing, not met)    PLAN     Progress PROM per protocol    Viridiana Haney, PTA

## 2024-09-30 ENCOUNTER — CLINICAL SUPPORT (OUTPATIENT)
Dept: REHABILITATION | Facility: HOSPITAL | Age: 49
End: 2024-09-30
Payer: COMMERCIAL

## 2024-09-30 ENCOUNTER — PATIENT MESSAGE (OUTPATIENT)
Dept: SPORTS MEDICINE | Facility: CLINIC | Age: 49
End: 2024-09-30
Payer: COMMERCIAL

## 2024-09-30 DIAGNOSIS — R29.3 POSTURE ABNORMALITY: Primary | ICD-10-CM

## 2024-09-30 DIAGNOSIS — R29.898 UPPER EXTREMITY WEAKNESS: ICD-10-CM

## 2024-09-30 DIAGNOSIS — M25.611 DECREASED RANGE OF MOTION OF RIGHT SHOULDER: ICD-10-CM

## 2024-09-30 PROCEDURE — 97110 THERAPEUTIC EXERCISES: CPT | Mod: CQ

## 2024-09-30 PROCEDURE — 97140 MANUAL THERAPY 1/> REGIONS: CPT | Mod: CQ

## 2024-09-30 PROCEDURE — 97112 NEUROMUSCULAR REEDUCATION: CPT | Mod: CQ

## 2024-09-30 NOTE — PROGRESS NOTES
[facility]   Physical Therapy Treatment Note     Name: Skye Marte  [facility] Number: [id number]    Therapy Diagnosis:   Encounter Diagnoses   Name Primary?    Posture abnormality Yes    Decreased range of motion of right shoulder     Upper extremity weakness      Physician: Isidro Paredes MD    Visit Date: 9/30/2024  Physician Orders: PT Eval and Treat  Medical Diagnosis from Referral:   M19.011 (ICD-10-CM) - Arthritis of right acromioclavicular joint   M75.21 (ICD-10-CM) - Biceps tendinitis of right upper extremity   M75.101 (ICD-10-CM) - Tear of right rotator cuff, unspecified tear extent, unspecified whether traumatic   Evaluation Date: 9/3/2024  Authorization Period Expiration: 12/31/2024  Plan of Care Expiration: 11/26/2024  Progress Note Due: 10/01/2024  Visit # / Visits authorized: 5/20  FOTO: 1/3     Time In: 0803  Time Out: 0850  Total Billable Time: 44 minutes    FOTO IE 9/12: 30  FOTO 2:   FOTO 3:   FOTO goal by #19: 63     Procedure by Reggie: 08/28/2024  1. Right shoulder arthroscopic rotator cuff repair.   2. Right shoulder open subpectoral biceps tenodesis  3. Right shoulder arthroscopic distal clavicle excision  4. Right shoulder arthroscopic subacromial decompression.   5. Right shoulder arthroscopic debridement labrum  Post-Op Precautions: Follow medium size rotator cuff repair      Precautions: Standard and post-op    SUBJECTIVE     Pt reports: she has been working hard on her HEP. No increased soreness p last tx. Presents in sling.     She was compliant with home exercise program.  Response to previous treatment: no adverse effects  Functional change: improved sling management    Pain: 1/10  Location: right shoulder      OBJECTIVE     DOS: 8/28/24  POD: 4 weeks, 5 days as of 9/30    Objective Measures updated at progress report unless specified.     Shoulder Passive Range of Motion: 9/30    Right Left   Flexion    106 deg 180 deg   ER at 0    45 deg 60  "deg   ER at 90    NT 95 deg   IR    NT 50 deg     Treatment     Skye received the treatments listed below:      THERAPEUTIC EXERCISES to develop strength, endurance, ROM, flexibility, posture, and core stabilization for 24 minutes including:    Pendulum hang 3 x 30"   Flexion walkaway stretch 12 x 10"   Table slides (flx, scaption) x 4' each  LLLD elbow ext stretch x 2'  Supine wand ER stretch @30deg 5" hold x 3'  Supine AA wand chest press 3 x 8  Pt education: HEP compliance    NP:  Elbow flex AROM x 30  Wrist ext, RD, flx 1# x 30 each    MANUAL THERAPY TECHNIQUES including Joint mobilizations and Soft tissue Mobilization were applied to R shoulder for 10 minutes.    Assessment of shldr ROM  GH oscillations/ grade 1-2  for pain/guarding  Inferior GH mob (gr II)  PROM within protocol limits  Elbow flex/ext PROM    NEUROMUSCULAR RE-EDUCATION ACTIVITIES to improve Balance, Coordination, Kinesthetic, Sense, Proprioception, and Posture for 08 minutes.  The following were included:     Unsupported as tolerated:   Scap retraction x 30  Shrugs x 30    THERAPEUTIC ACTIVITIES to improve dynamic and functional performance for 00 minutes including.        Patient Education and Home Exercises     Home Exercises Provided and Patient Education Provided     Education provided:   - continue HEP    Written Home Exercises Provided: Patient instructed to cont prior HEP. Exercises were reviewed and Skye was able to demonstrate them prior to the end of the session.  Skye demonstrated good understanding of the education provided. See EMR under Patient Instructions for exercises provided during therapy sessions    ASSESSMENT     Skye did great today. PROM flexion and ER consistent with previous tx. Added LLLD elbow ext stretch to HEP and encouraged continued compliance with HEP for motion recovery. No adverse effects reported p tx.     Skye is progressing well towards her goals.   Pt prognosis is Good.     Pt will continue to " benefit from skilled outpatient physical therapy to address the deficits listed in the problem list box on initial evaluation, provide pt/family education and to maximize pt's level of independence in the home and community environment.     Pt's spiritual, cultural and educational needs considered and pt agreeable to plan of care and goals.     Anticipated barriers to physical therapy: none    Goals:   Short Term Goals: 6 weeks  1. Pt will be compliant with HEP 50% of prescribed amount. (Progressing, not met)  2. The pt to demo improvement in R shoulder PROM to by 80% of the L (Progressing, not met)  3.  The pt to demo tolerance to being out of the sling for 24 hours with pain <2/10 (Progressing, not met)     Long Term Goals: 24 weeks   Pt will be compliant with % of prescribed amount.  (Progressing, not met)  Pt will score 20% improvement on FOTO Shoulder Mobility  (Progressing, not met)  The pt to improvement in AROM of R shoulder within 80% of L shoulder to improve tolerance to OH movement  (Progressing, not met)  The pt to  Demo at least 4+/5 of RTC muscle testing to demo improvement in tolerance to activity (Progressing, not met)  The pt to tolerate lifting 10# overhead to improve tolerance to ADLs and work related activities  (Progressing, not met)  The pt will report full participation in ADLs and IADLs without restrictions related to R Shoulder.   (Progressing, not met)    PLAN     Progress PROM per protocol    Viridiana Haney, ALEXANDRA

## 2024-10-04 ENCOUNTER — CLINICAL SUPPORT (OUTPATIENT)
Dept: REHABILITATION | Facility: HOSPITAL | Age: 49
End: 2024-10-04
Payer: COMMERCIAL

## 2024-10-04 DIAGNOSIS — M25.611 DECREASED RANGE OF MOTION OF RIGHT SHOULDER: ICD-10-CM

## 2024-10-04 DIAGNOSIS — R29.898 UPPER EXTREMITY WEAKNESS: ICD-10-CM

## 2024-10-04 DIAGNOSIS — R29.3 POSTURE ABNORMALITY: Primary | ICD-10-CM

## 2024-10-04 PROCEDURE — 97110 THERAPEUTIC EXERCISES: CPT

## 2024-10-04 PROCEDURE — 97112 NEUROMUSCULAR REEDUCATION: CPT

## 2024-10-04 PROCEDURE — 97140 MANUAL THERAPY 1/> REGIONS: CPT

## 2024-10-04 NOTE — PROGRESS NOTES
[facility]   Physical Therapy Treatment Note     Name: Skye Marte  [facility] Number: [id number]    Therapy Diagnosis:   Encounter Diagnoses   Name Primary?    Posture abnormality Yes    Decreased range of motion of right shoulder     Upper extremity weakness      Physician: Isidro Paredes MD    Visit Date: 10/4/2024  Physician Orders: PT Eval and Treat  Medical Diagnosis from Referral:   M19.011 (ICD-10-CM) - Arthritis of right acromioclavicular joint   M75.21 (ICD-10-CM) - Biceps tendinitis of right upper extremity   M75.101 (ICD-10-CM) - Tear of right rotator cuff, unspecified tear extent, unspecified whether traumatic   Evaluation Date: 9/3/2024  Authorization Period Expiration: 12/31/2024  Plan of Care Expiration: 11/26/2024  Progress Note Due: 10/01/2024  Visit # / Visits authorized: 7/20  FOTO: 1/3     Time In: 1000 am  Time Out: 1100 am  Total Billable Time: 60 minutes    FOTO IE 9/12: 30  FOTO 2:   FOTO 3:   FOTO goal by #19: 63     Procedure by Reggie: 08/28/2024  1. Right shoulder arthroscopic rotator cuff repair.   2. Right shoulder open subpectoral biceps tenodesis  3. Right shoulder arthroscopic distal clavicle excision  4. Right shoulder arthroscopic subacromial decompression.   5. Right shoulder arthroscopic debridement labrum  Post-Op Precautions: Follow medium size rotator cuff repair      Precautions: Standard and post-op    SUBJECTIVE     Pt reports: she's still working hard on her HEP    She was compliant with home exercise program.  Response to previous treatment: no adverse effects  Functional change: improved sling management    Pain: 1/10  Location: right shoulder      OBJECTIVE     DOS: 8/28/24  POD: 5 weeks, 2 days as of 10/4    Objective Measures updated at progress report unless specified.     Shoulder Passive Range of Motion: 9/30    Right Left   Flexion    130 deg 180 deg   ER at 0    40 deg (pre tx)  60 deg (post tx) 70 deg   ER at 90    NT 95  "deg   IR    NT 50 deg     Treatment     Skye received the treatments listed below:      THERAPEUTIC EXERCISES to develop strength, endurance, ROM, flexibility, posture, and core stabilization for 33 minutes including:    Pendulum hang 3 x 30"   Flexion walkaway stretch 12 x 10"   LLLD elbow ext stretch x 2'  Supine wand ER stretch @30deg 5" hold x 3'  Supine AA wand chest press 3 x 10  Supine AA wand chest press to OH; 3 x 10   Pt education: HEP compliance    NP:  Elbow flex AROM x 30  Wrist ext, RD, flx 1# x 30 each  Table slides (flx, scaption) x 4' each    MANUAL THERAPY TECHNIQUES including Joint mobilizations and Soft tissue Mobilization were applied to R shoulder for 12 minutes.    Assessment of shldr ROM  GH oscillations/ grade 1-2  for pain/guarding  Inferior GH mob (gr II)  PROM within protocol limits  Elbow flex/ext PROM    NEUROMUSCULAR RE-EDUCATION ACTIVITIES to improve Balance, Coordination, Kinesthetic, Sense, Proprioception, and Posture for 15 minutes.  The following were included:   Walkouts ER/IR; YTB; 3 x 10   Unsupported as tolerated:   Scap retraction x 30  Shrugs x 30    THERAPEUTIC ACTIVITIES to improve dynamic and functional performance for 00 minutes including.        Patient Education and Home Exercises     Home Exercises Provided and Patient Education Provided     Education provided:   - continue HEP    Written Home Exercises Provided: Patient instructed to cont prior HEP. Exercises were reviewed and Skye was able to demonstrate them prior to the end of the session.  Skye demonstrated good understanding of the education provided. See EMR under Patient Instructions for exercises provided during therapy sessions    ASSESSMENT     Skye is demonstrating continual progression in passive range of motion as well as improved tolerance to passive range. She will benefit continued progression of passive range to full prior d/c from sling  Skye is progressing well towards her goals.   Pt " prognosis is Good.     Pt will continue to benefit from skilled outpatient physical therapy to address the deficits listed in the problem list box on initial evaluation, provide pt/family education and to maximize pt's level of independence in the home and community environment.     Pt's spiritual, cultural and educational needs considered and pt agreeable to plan of care and goals.     Anticipated barriers to physical therapy: none    Goals:   Short Term Goals: 6 weeks  1. Pt will be compliant with HEP 50% of prescribed amount. (Progressing, not met)  2. The pt to demo improvement in R shoulder PROM to by 80% of the L (Progressing, not met)  3.  The pt to demo tolerance to being out of the sling for 24 hours with pain <2/10 (Progressing, not met)     Long Term Goals: 24 weeks   Pt will be compliant with % of prescribed amount.  (Progressing, not met)  Pt will score 20% improvement on FOTO Shoulder Mobility  (Progressing, not met)  The pt to improvement in AROM of R shoulder within 80% of L shoulder to improve tolerance to OH movement  (Progressing, not met)  The pt to  Demo at least 4+/5 of RTC muscle testing to demo improvement in tolerance to activity (Progressing, not met)  The pt to tolerate lifting 10# overhead to improve tolerance to ADLs and work related activities  (Progressing, not met)  The pt will report full participation in ADLs and IADLs without restrictions related to R Shoulder.   (Progressing, not met)    PLAN     Progress PROM per protocol    Lelo Everett, PT, DPT, SCS

## 2024-10-07 ENCOUNTER — CLINICAL SUPPORT (OUTPATIENT)
Dept: REHABILITATION | Facility: HOSPITAL | Age: 49
End: 2024-10-07
Payer: COMMERCIAL

## 2024-10-07 DIAGNOSIS — R29.898 UPPER EXTREMITY WEAKNESS: ICD-10-CM

## 2024-10-07 DIAGNOSIS — R29.3 POSTURE ABNORMALITY: Primary | ICD-10-CM

## 2024-10-07 DIAGNOSIS — M25.611 DECREASED RANGE OF MOTION OF RIGHT SHOULDER: ICD-10-CM

## 2024-10-07 PROCEDURE — 97112 NEUROMUSCULAR REEDUCATION: CPT | Mod: CQ

## 2024-10-07 PROCEDURE — 97110 THERAPEUTIC EXERCISES: CPT | Mod: CQ

## 2024-10-07 PROCEDURE — 97140 MANUAL THERAPY 1/> REGIONS: CPT | Mod: CQ

## 2024-10-07 NOTE — PROGRESS NOTES
[facility]   Physical Therapy Treatment Note     Name: Skye Marte  [facility] Number: [id number]    Therapy Diagnosis:   Encounter Diagnoses   Name Primary?    Posture abnormality Yes    Decreased range of motion of right shoulder     Upper extremity weakness      Physician: Isidro Paredes MD    Visit Date: 10/7/2024  Physician Orders: PT Eval and Treat  Medical Diagnosis from Referral:   M19.011 (ICD-10-CM) - Arthritis of right acromioclavicular joint   M75.21 (ICD-10-CM) - Biceps tendinitis of right upper extremity   M75.101 (ICD-10-CM) - Tear of right rotator cuff, unspecified tear extent, unspecified whether traumatic   Evaluation Date: 9/3/2024  Authorization Period Expiration: 12/31/2024  Plan of Care Expiration: 11/26/2024  Progress Note Due: 10/01/2024  Visit # / Visits authorized: 7/20  FOTO: 1/3     Time In: 0705  Time Out: 0801  Total Billable Time: 53 minutes    FOTO next tx if appropriate    FOTO IE 9/12: 30  FOTO 2:   FOTO 3:   FOTO goal by #19: 63     Procedure by Reggie: 08/28/2024  1. Right shoulder arthroscopic rotator cuff repair.   2. Right shoulder open subpectoral biceps tenodesis  3. Right shoulder arthroscopic distal clavicle excision  4. Right shoulder arthroscopic subacromial decompression.   5. Right shoulder arthroscopic debridement labrum  Post-Op Precautions: Follow medium size rotator cuff repair      Precautions: Standard and post-op    SUBJECTIVE     Pt reports: consistent compliance with HEP over the weekend. No issues with newly added walkouts.     She was compliant with home exercise program.  Response to previous treatment: no adverse effects  Functional change: improved sling management    Pain: 1/10  Location: right shoulder      OBJECTIVE     DOS: 8/28/24  POD: 5 weeks, 5 days as of 10/7    Objective Measures updated at progress report unless specified.     Shoulder Passive Range of Motion: 10/7    Right Left   Flexion    120 deg 180  "deg   ER at 0    62 deg  70 deg   ER at 90    NT 95 deg   IR    NT 50 deg     Treatment     Skye received the treatments listed below:      THERAPEUTIC EXERCISES to develop strength, endurance, ROM, flexibility, posture, and core stabilization for 30 minutes including:    Pendulum hang 3 x 30"   Flexion walkaway stretch 12 x 10"   Table slides (flx, scaption) x 3' each  LLLD elbow ext stretch x 2' - np  Supine wand ER stretch @30deg 5" hold x 3'  Supine AA wand chest press 3 x 10  Supine AA wand chest press to OH 3 x 10   Wrist ext, RD, flx, sup/pro 2# x 45 each  Pt education: HEP compliance    MANUAL THERAPY TECHNIQUES including Joint mobilizations and Soft tissue Mobilization were applied to R shoulder for 10 minutes.    Assessment of shldr ROM  GH oscillations/ grade 1-2  for pain/guarding  Inferior GH mob (gr II)  PROM within protocol limits  Elbow flex/ext PROM    NEUROMUSCULAR RE-EDUCATION ACTIVITIES to improve Balance, Coordination, Kinesthetic, Sense, Proprioception, and Posture for 13 minutes.  The following were included:     Shrugs x 30  Scap retraction x 30  ER walkouts OTB 2 x 10  IR walkouts OTB 2 x 10    THERAPEUTIC ACTIVITIES to improve dynamic and functional performance for 00 minutes including.        Patient Education and Home Exercises     Home Exercises Provided and Patient Education Provided     Education provided:   - continue HEP    Written Home Exercises Provided: Patient instructed to cont prior HEP. Exercises were reviewed and Skye was able to demonstrate them prior to the end of the session.  Skye demonstrated good understanding of the education provided. See EMR under Patient Instructions for exercises provided during therapy sessions    ASSESSMENT     Skye did great today. PROM flexion and ER improved since previous tx. Will review sling d/c next tx p MD appt. Encouraged continued compliance with HEP for motion recovery. No adverse effects reported p tx.     Skye is " progressing well towards her goals.   Pt prognosis is Good.     Pt will continue to benefit from skilled outpatient physical therapy to address the deficits listed in the problem list box on initial evaluation, provide pt/family education and to maximize pt's level of independence in the home and community environment.     Pt's spiritual, cultural and educational needs considered and pt agreeable to plan of care and goals.     Anticipated barriers to physical therapy: none    Goals:   Short Term Goals: 6 weeks  1. Pt will be compliant with HEP 50% of prescribed amount. (Progressing, not met)  2. The pt to demo improvement in R shoulder PROM to by 80% of the L (Progressing, not met)  3.  The pt to demo tolerance to being out of the sling for 24 hours with pain <2/10 (Progressing, not met)     Long Term Goals: 24 weeks   Pt will be compliant with % of prescribed amount.  (Progressing, not met)  Pt will score 20% improvement on FOTO Shoulder Mobility  (Progressing, not met)  The pt to improvement in AROM of R shoulder within 80% of L shoulder to improve tolerance to OH movement  (Progressing, not met)  The pt to  Demo at least 4+/5 of RTC muscle testing to demo improvement in tolerance to activity (Progressing, not met)  The pt to tolerate lifting 10# overhead to improve tolerance to ADLs and work related activities  (Progressing, not met)  The pt will report full participation in ADLs and IADLs without restrictions related to R Shoulder.   (Progressing, not met)    PLAN     Progress PROM per protocol    Viridiana Haney, PTA

## 2024-10-10 ENCOUNTER — OFFICE VISIT (OUTPATIENT)
Dept: SPORTS MEDICINE | Facility: CLINIC | Age: 49
End: 2024-10-10
Payer: COMMERCIAL

## 2024-10-10 ENCOUNTER — CLINICAL SUPPORT (OUTPATIENT)
Dept: REHABILITATION | Facility: HOSPITAL | Age: 49
End: 2024-10-10
Payer: COMMERCIAL

## 2024-10-10 VITALS
SYSTOLIC BLOOD PRESSURE: 114 MMHG | DIASTOLIC BLOOD PRESSURE: 73 MMHG | HEIGHT: 63 IN | WEIGHT: 123 LBS | HEART RATE: 73 BPM | BODY MASS INDEX: 21.79 KG/M2

## 2024-10-10 DIAGNOSIS — M25.611 DECREASED RANGE OF MOTION OF RIGHT SHOULDER: ICD-10-CM

## 2024-10-10 DIAGNOSIS — Z98.890 S/P ROTATOR CUFF REPAIR: Primary | ICD-10-CM

## 2024-10-10 DIAGNOSIS — R29.898 UPPER EXTREMITY WEAKNESS: ICD-10-CM

## 2024-10-10 DIAGNOSIS — R29.3 POSTURE ABNORMALITY: Primary | ICD-10-CM

## 2024-10-10 PROCEDURE — 97110 THERAPEUTIC EXERCISES: CPT | Mod: CQ

## 2024-10-10 PROCEDURE — 1159F MED LIST DOCD IN RCRD: CPT | Mod: CPTII,S$GLB,, | Performed by: ORTHOPAEDIC SURGERY

## 2024-10-10 PROCEDURE — 3078F DIAST BP <80 MM HG: CPT | Mod: CPTII,S$GLB,, | Performed by: ORTHOPAEDIC SURGERY

## 2024-10-10 PROCEDURE — 97112 NEUROMUSCULAR REEDUCATION: CPT | Mod: CQ

## 2024-10-10 PROCEDURE — 99999 PR PBB SHADOW E&M-EST. PATIENT-LVL IV: CPT | Mod: PBBFAC,,, | Performed by: ORTHOPAEDIC SURGERY

## 2024-10-10 PROCEDURE — 97140 MANUAL THERAPY 1/> REGIONS: CPT | Mod: CQ

## 2024-10-10 PROCEDURE — 99024 POSTOP FOLLOW-UP VISIT: CPT | Mod: S$GLB,,, | Performed by: ORTHOPAEDIC SURGERY

## 2024-10-10 PROCEDURE — 3074F SYST BP LT 130 MM HG: CPT | Mod: CPTII,S$GLB,, | Performed by: ORTHOPAEDIC SURGERY

## 2024-10-10 RX ORDER — PROGESTERONE 200 MG/1
200 CAPSULE ORAL NIGHTLY
COMMUNITY

## 2024-10-10 NOTE — PROGRESS NOTES
"CC: Right shoulder post op 6 weeks    Patient is here for her 6 week post op appointment s/p below and is doing well. Patient is doing PT at [facility] and is progressing as expected.     DATE OF SURGERY:  8/28/2024      PREOPERATIVE DIAGNOSES:   1. Right shoulder rotator cuff tear.   2. Right shoulder biceps tendinopathy  3. Right shoulder AC joint arthritis  4. Right shoulder labral tear     POSTOPERATIVE DIAGNOSES:   1. Right shoulder rotator cuff tear.   2. Right shoulder biceps tendinopathy  3. Right shoulder AC joint arthritis  4. Right shoulder labral tear     PROCEDURE:   1. Right shoulder arthroscopic rotator cuff repair.   2. Right shoulder open subpectoral biceps tenodesis  3. Right shoulder arthroscopic distal clavicle excision  4. Right shoulder arthroscopic subacromial decompression.   5. Right shoulder arthroscopic debridement labrum     SURGEON: Isidro Paredes M.D.     Pain well tolerated on pain medication  Sling in place  No issues reported    Review of Systems   Constitution: Negative. Negative for chills, fever and night sweats.    Cardiovascular: Negative for chest pain and syncope.   Respiratory: Negative for cough and shortness of breath.    Gastrointestinal: Negative for abdominal pain and bowel incontinence.      PE:    /73   Pulse 73   Ht 5' 3" (1.6 m)   Wt 55.8 kg (123 lb 0.3 oz)   LMP 11/28/2019   BMI 21.79 kg/m²      Right shoulder    Incision healed  No sign of infection  Swelling resolved  Compartments soft  Neurovascular status intact in extremity    PROM  Forward elevation 65 degrees  External rotation 5 degrees    Assessment:  6 weeks s/p right shoulder arthroscopic rotator cuff repair, subacromial decompression, distal clavicle excision, labral debridement, open subpectoral biceps tenodesis    Plan:  1. Continue PT   2. Discontinue sling  3. F/u in 6 weeks.       All questions were answered. Instructed patient to call with questions or concerns in the interim. "

## 2024-10-10 NOTE — PROGRESS NOTES
[facility]   Physical Therapy Treatment Note     Name: Skye Marte  [facility] Number: [id number]    Therapy Diagnosis:   Encounter Diagnoses   Name Primary?    Posture abnormality Yes    Decreased range of motion of right shoulder     Upper extremity weakness      Physician: Isidro Paredes MD    Visit Date: 10/10/2024  Physician Orders: PT Eval and Treat  Medical Diagnosis from Referral:   M19.011 (ICD-10-CM) - Arthritis of right acromioclavicular joint   M75.21 (ICD-10-CM) - Biceps tendinitis of right upper extremity   M75.101 (ICD-10-CM) - Tear of right rotator cuff, unspecified tear extent, unspecified whether traumatic   Evaluation Date: 9/3/2024  Authorization Period Expiration: 12/31/2024  Plan of Care Expiration: 11/26/2024  Progress Note Due: 10/01/2024  Visit # / Visits authorized: 8/20  FOTO: 1/3     Time In: 1620 (late arrival)  Time Out: 1707  Total Billable Time: 43 minutes    FOTO next tx if appropriate    FOTO IE 9/12: 30  FOTO 2:   FOTO 3:   FOTO goal by #19: 63     Procedure by Reggie: 08/28/2024  1. Right shoulder arthroscopic rotator cuff repair.   2. Right shoulder open subpectoral biceps tenodesis  3. Right shoulder arthroscopic distal clavicle excision  4. Right shoulder arthroscopic subacromial decompression.   5. Right shoulder arthroscopic debridement labrum  Post-Op Precautions: Follow medium size rotator cuff repair      Precautions: Standard and post-op    SUBJECTIVE     Pt reports: good report from MD who discharged her sling. Reports consistent compliance with HEP. Presents in protective posture.     She was compliant with home exercise program.  Response to previous treatment: no adverse effects  Functional change: improved sling management    Pain: 1/10  Location: right shoulder      OBJECTIVE     DOS: 8/28/24  POD: 6 weeks, 1 days as of 10/10    Objective Measures updated at progress report unless specified.     Shoulder Passive Range of  "Motion: 10/10    Right Left   Flexion    130 deg 180 deg   ER at 0    62 deg  70 deg   ER at 90    NT 95 deg   IR    NT 50 deg     Treatment     Skye received the treatments listed below:      THERAPEUTIC EXERCISES to develop strength, endurance, ROM, flexibility, posture, and core stabilization for 20 minutes including:    Flexion walkaway stretch 12 x 10"   Supine wand ER stretch @30deg 10" hold x 4'  Supine AA wand chest press 3 x 10  Supine AA wand flexion to tolerance 3 x 10   Pt education: HEP compliance    NP:  Table slides (flx, scaption) x 3' each  LLLD elbow ext stretch x 2'   Pendulum hang 3 x 30"   Wrist ext, RD, flx, sup/pro 2# x 45 each    MANUAL THERAPY TECHNIQUES including Joint mobilizations and Soft tissue Mobilization were applied to R shoulder for 10 minutes.    Assessment of shldr ROM  GH oscillations/ grade 1-2  for pain/guarding  Inferior GH mob (gr II)  PROM within protocol limits  Elbow flex/ext PROM    NEUROMUSCULAR RE-EDUCATION ACTIVITIES to improve Balance, Coordination, Kinesthetic, Sense, Proprioception, and Posture for 13 minutes.  The following were included:     Landmines 3 x 10  Ext c scap retraction OTB 3 x 10    NP:  Shrugs x 30  Scap retraction x 30  ER walkouts OTB 2 x 10  IR walkouts OTB 2 x 10    THERAPEUTIC ACTIVITIES to improve dynamic and functional performance for 00 minutes including.        Patient Education and Home Exercises     Home Exercises Provided and Patient Education Provided     Education provided:   - continue HEP    Written Home Exercises Provided: Patient instructed to cont prior HEP. Exercises were reviewed and Skye was able to demonstrate them prior to the end of the session.  Skye demonstrated good understanding of the education provided. See EMR under Patient Instructions for exercises provided during therapy sessions    ASSESSMENT     Skye did well today. PROM continue to improve with time. Good tolerance to initiation of landmines and resisted " ext. Reviewed HEP, lifting restrictions, and sling wean. No adverse effects reported p txStephie Cheung is progressing well towards her goals.   Pt prognosis is Good.     Pt will continue to benefit from skilled outpatient physical therapy to address the deficits listed in the problem list box on initial evaluation, provide pt/family education and to maximize pt's level of independence in the home and community environment.     Pt's spiritual, cultural and educational needs considered and pt agreeable to plan of care and goals.     Anticipated barriers to physical therapy: none    Goals:   Short Term Goals: 6 weeks  1. Pt will be compliant with HEP 50% of prescribed amount. (Progressing, not met)  2. The pt to demo improvement in R shoulder PROM to by 80% of the L (Progressing, not met)  3.  The pt to demo tolerance to being out of the sling for 24 hours with pain <2/10 (Progressing, not met)     Long Term Goals: 24 weeks   Pt will be compliant with % of prescribed amount.  (Progressing, not met)  Pt will score 20% improvement on FOTO Shoulder Mobility  (Progressing, not met)  The pt to improvement in AROM of R shoulder within 80% of L shoulder to improve tolerance to OH movement  (Progressing, not met)  The pt to  Demo at least 4+/5 of RTC muscle testing to demo improvement in tolerance to activity (Progressing, not met)  The pt to tolerate lifting 10# overhead to improve tolerance to ADLs and work related activities  (Progressing, not met)  The pt will report full participation in ADLs and IADLs without restrictions related to R Shoulder.   (Progressing, not met)    PLAN     Progress PROM per protocol    Viridiana Haney, PTA

## 2024-10-11 DIAGNOSIS — Z98.890 S/P ROTATOR CUFF REPAIR: Primary | ICD-10-CM

## 2024-10-11 RX ORDER — MELOXICAM 7.5 MG/1
7.5 TABLET ORAL DAILY
Qty: 30 TABLET | Refills: 3 | Status: SHIPPED | OUTPATIENT
Start: 2024-10-11

## 2024-10-11 NOTE — TELEPHONE ENCOUNTER
Mobic refill   ----- Message from Steven sent at 10/11/2024 10:06 AM CDT -----  Regarding: Pharm Auth  Contact: pt [phone]  Rx Refill/Request    Is this a Refill or New Rx:  new    Rx Name and Strength:      - antiinflammatory    Preferred Pharmacy with phone number:    [facility] # [id number] - [address] [address]  Phone: [phone] Fax: [phone]     Communication Preference: please call pt w/update @[phone]    Additional Information:  pt was seen in office provider stated will call in new prescription, pharmacy does not have, pt does not know name of medication

## 2024-10-14 ENCOUNTER — CLINICAL SUPPORT (OUTPATIENT)
Dept: REHABILITATION | Facility: HOSPITAL | Age: 49
End: 2024-10-14
Payer: COMMERCIAL

## 2024-10-14 DIAGNOSIS — R29.898 UPPER EXTREMITY WEAKNESS: ICD-10-CM

## 2024-10-14 DIAGNOSIS — M25.611 DECREASED RANGE OF MOTION OF RIGHT SHOULDER: ICD-10-CM

## 2024-10-14 DIAGNOSIS — R29.3 POSTURE ABNORMALITY: Primary | ICD-10-CM

## 2024-10-14 PROCEDURE — 97140 MANUAL THERAPY 1/> REGIONS: CPT

## 2024-10-14 PROCEDURE — 97112 NEUROMUSCULAR REEDUCATION: CPT

## 2024-10-14 PROCEDURE — 97110 THERAPEUTIC EXERCISES: CPT

## 2024-10-14 NOTE — PROGRESS NOTES
[facility]   Physical Therapy Treatment Note     Name: Skye Marte  [facility] Number: [id number]    Therapy Diagnosis:   Encounter Diagnoses   Name Primary?    Posture abnormality Yes    Decreased range of motion of right shoulder     Upper extremity weakness      Physician: Isidro Paredes MD    Visit Date: 10/14/2024  Physician Orders: PT Eval and Treat  Medical Diagnosis from Referral:   M19.011 (ICD-10-CM) - Arthritis of right acromioclavicular joint   M75.21 (ICD-10-CM) - Biceps tendinitis of right upper extremity   M75.101 (ICD-10-CM) - Tear of right rotator cuff, unspecified tear extent, unspecified whether traumatic   Evaluation Date: 9/3/2024  Authorization Period Expiration: 12/31/2024  Plan of Care Expiration: 11/26/2024  Progress Note Due: 10/01/2024  Visit # / Visits authorized: 9/20  FOTO: 1/3     Time In: 0530 pm  Time Out: 0630 pm  Total Billable Time: 60 minutes    FOTO next tx if appropriate    FOTO IE 9/12: 30  FOTO 2:   FOTO 3:   FOTO goal by #19: 63     Procedure by Reggie: 08/28/2024  1. Right shoulder arthroscopic rotator cuff repair.   2. Right shoulder open subpectoral biceps tenodesis  3. Right shoulder arthroscopic distal clavicle excision  4. Right shoulder arthroscopic subacromial decompression.   5. Right shoulder arthroscopic debridement labrum  Post-Op Precautions: Follow medium size rotator cuff repair      Precautions: Standard and post-op    SUBJECTIVE     Pt reports: she didn't wean out of her sling, but rather only wore it at night and reports her shoulder is very sore and painful now     She was compliant with home exercise program.  Response to previous treatment: no adverse effects  Functional change: improved sling management    Pain: 1/10  Location: right shoulder      OBJECTIVE     DOS: 8/28/24  POD: 6 weeks, 5 days as of 10/14    Objective Measures updated at progress report unless specified.     Shoulder Passive Range of Motion:  "10/10    Right Left   Flexion    130 deg 180 deg   ER at 0    62 deg  70 deg   ER at 90    NT 95 deg   IR    NT 50 deg     Treatment     Skye received the treatments listed below:      THERAPEUTIC EXERCISES to develop strength, endurance, ROM, flexibility, posture, and core stabilization for 25 minutes including:  Seated abd prop + MHP x 6 min   Flexion walkaway stretch 12 x 10"   Supine wand ER stretch @30deg 10" hold x 4'  Supine AA wand chest press 3 x 10  Supine AA wand flexion to tolerance 3 x 10   Pt education: HEP compliance    NP:  Table slides (flx, scaption) x 3' each  LLLD elbow ext stretch x 2'   Pendulum hang 3 x 30"   Wrist ext, RD, flx, sup/pro 2# x 45 each    MANUAL THERAPY TECHNIQUES including Joint mobilizations and Soft tissue Mobilization were applied to R shoulder for 15 minutes.    Assessment of shldr ROM  GH oscillations/ grade 1-2  for pain/guarding  Inferior GH mob (gr II)  PROM within protocol limits  Elbow flex/ext PROM    NEUROMUSCULAR RE-EDUCATION ACTIVITIES to improve Balance, Coordination, Kinesthetic, Sense, Proprioception, and Posture for 20 minutes.  The following were included:   ER walkouts OTB 2 x 10  IR walkouts OTB 2 x 10  Shrugs x 30  Scap retraction x 30    NP:  Landmines 3 x 10  Ext c scap retraction OTB 3 x 10    THERAPEUTIC ACTIVITIES to improve dynamic and functional performance for 00 minutes including.        Patient Education and Home Exercises     Home Exercises Provided and Patient Education Provided     Education provided:   - continue HEP    Written Home Exercises Provided: Patient instructed to cont prior HEP. Exercises were reviewed and Skye was able to demonstrate them prior to the end of the session.  Skye demonstrated good understanding of the education provided. See EMR under Patient Instructions for exercises provided during therapy sessions    ASSESSMENT     Skye presented with increased tissue irritability 2/2 increased loading from d/cing sling. " She responded well to gently joint mobilizations and inferior capsule stretching. She had decreased irritability and improved passive range by end of session, will progress as able pending irritability next session     Skye is progressing well towards her goals.   Pt prognosis is Good.     Pt will continue to benefit from skilled outpatient physical therapy to address the deficits listed in the problem list box on initial evaluation, provide pt/family education and to maximize pt's level of independence in the home and community environment.     Pt's spiritual, cultural and educational needs considered and pt agreeable to plan of care and goals.     Anticipated barriers to physical therapy: none    Goals:   Short Term Goals: 6 weeks  1. Pt will be compliant with HEP 50% of prescribed amount. (Progressing, not met)  2. The pt to demo improvement in R shoulder PROM to by 80% of the L (Progressing, not met)  3.  The pt to demo tolerance to being out of the sling for 24 hours with pain <2/10 (Progressing, not met)     Long Term Goals: 24 weeks   Pt will be compliant with % of prescribed amount.  (Progressing, not met)  Pt will score 20% improvement on FOTO Shoulder Mobility  (Progressing, not met)  The pt to improvement in AROM of R shoulder within 80% of L shoulder to improve tolerance to OH movement  (Progressing, not met)  The pt to  Demo at least 4+/5 of RTC muscle testing to demo improvement in tolerance to activity (Progressing, not met)  The pt to tolerate lifting 10# overhead to improve tolerance to ADLs and work related activities  (Progressing, not met)  The pt will report full participation in ADLs and IADLs without restrictions related to R Shoulder.   (Progressing, not met)    PLAN     Progress PROM per protocol    Lelo Everett, PT, DPT, SCS

## 2024-10-18 ENCOUNTER — CLINICAL SUPPORT (OUTPATIENT)
Dept: REHABILITATION | Facility: HOSPITAL | Age: 49
End: 2024-10-18
Payer: COMMERCIAL

## 2024-10-18 DIAGNOSIS — R29.898 UPPER EXTREMITY WEAKNESS: ICD-10-CM

## 2024-10-18 DIAGNOSIS — M25.611 DECREASED RANGE OF MOTION OF RIGHT SHOULDER: ICD-10-CM

## 2024-10-18 DIAGNOSIS — R29.3 POSTURE ABNORMALITY: Primary | ICD-10-CM

## 2024-10-18 PROCEDURE — 97110 THERAPEUTIC EXERCISES: CPT

## 2024-10-18 PROCEDURE — 97112 NEUROMUSCULAR REEDUCATION: CPT

## 2024-10-18 PROCEDURE — 97140 MANUAL THERAPY 1/> REGIONS: CPT

## 2024-10-18 NOTE — PROGRESS NOTES
[facility]   Physical Therapy Treatment Note     Name: Skye Marte  [facility] Number: [id number]    Therapy Diagnosis:   Encounter Diagnoses   Name Primary?    Posture abnormality Yes    Decreased range of motion of right shoulder     Upper extremity weakness        Physician: Isidro Paredes MD    Visit Date: 10/18/2024  Physician Orders: PT Eval and Treat  Medical Diagnosis from Referral:   M19.011 (ICD-10-CM) - Arthritis of right acromioclavicular joint   M75.21 (ICD-10-CM) - Biceps tendinitis of right upper extremity   M75.101 (ICD-10-CM) - Tear of right rotator cuff, unspecified tear extent, unspecified whether traumatic   Evaluation Date: 9/3/2024  Authorization Period Expiration: 12/31/2024  Plan of Care Expiration: 11/26/2024  Progress Note Due: 10/01/2024  Visit # / Visits authorized: 10/20  FOTO: 1/3     Time In: 1300  Time Out: 1400  Total Billable Time: 60 minutes    FOTO next tx if appropriate    FOTO IE 9/12: 30  FOTO 2:   FOTO 3:   FOTO goal by #19: 63     Procedure by Reggie: 08/28/2024  1. Right shoulder arthroscopic rotator cuff repair.   2. Right shoulder open subpectoral biceps tenodesis  3. Right shoulder arthroscopic distal clavicle excision  4. Right shoulder arthroscopic subacromial decompression.   5. Right shoulder arthroscopic debridement labrum  Post-Op Precautions: Follow medium size rotator cuff repair      Precautions: Standard and post-op    SUBJECTIVE     Pt reports: no pain, but discomfort from decreased sling use & stated she occasionally wears it to provide relief.    She was compliant with home exercise program.  Response to previous treatment: no adverse effects  Functional change: improved sling management    Pain: 1/10  Location: right shoulder      OBJECTIVE     DOS: 8/28/24  POD: 6 weeks, 5 days as of 10/14    Objective Measures updated at progress report unless specified.     Shoulder Passive Range of Motion: 10/10    Right Left  "  Flexion    130 deg 180 deg   ER at 0    62 deg  70 deg   ER at 90    NT 95 deg   IR    NT 50 deg     Treatment     Skye received the treatments listed below:      THERAPEUTIC EXERCISES to develop strength, endurance, ROM, flexibility, posture, and core stabilization for 25 minutes including:    Seated abd prop + MHP x5'   Supine wand ER stretch @30deg 10" hold x5'  Supine AA wand chest press to flexion as tolerated x30  Pt education: HEP exercise addition of standing row with RTB & sling DC schedule     NP Today   Table slides (flx, scaption) x 3' each  LLLD elbow ext stretch x 2'   Pendulum hang 3 x 30"   Wrist ext, RD, flx, sup/pro 2# x 45 each  Flexion walkaway stretch 12 x 10"   Supine AA wand flexion to tolerance 3 x 10     MANUAL THERAPY TECHNIQUES including Joint mobilizations and Soft tissue Mobilization were applied to R shoulder for 15 minutes.    Assessment of shldr ROM  Elbow Flex, Subscap, & Lat release   Gr. I-III GH oscillations for guarding  Gr. I-III Posterior Mob     NP Today  Inferior GH mob (gr II)  PROM within protocol limits  Elbow flex/ext PROM    NEUROMUSCULAR RE-EDUCATION ACTIVITIES to improve Balance, Coordination, Kinesthetic, Sense, Proprioception, and Posture for 20 minutes.  The following were included:     External Rotation/Internal Rotation 2-step walkouts GTB with perturbation 2x10 each way  GTB Standing BL Row 2x10 (3" hold)  Below 90 ABCs with towel roll x1    NP Today  Landmines 3 x 10  Ext c scap retraction OTB 3 x 10  Shrugs x 30  Scap retraction x 30    THERAPEUTIC ACTIVITIES to improve dynamic and functional performance for 00 minutes including.    Patient Education and Home Exercises     Home Exercises Provided and Patient Education Provided     Education provided:   - continue HEP    Written Home Exercises Provided: Patient instructed to cont prior HEP. Exercises were reviewed and Skye was able to demonstrate them prior to the end of the session.  Skye demonstrated " good understanding of the education provided. See EMR under Patient Instructions for exercises provided during therapy sessions    ASSESSMENT     Skye presented today with low shoulder irritability & achieved a forward elevation of 95 deg that improved to 105 deg following manual techniques. She reports occasional sling use to provide relief to shoulder & was instructed on a schedule of using only 20 minutes towards the end of the day if necessary but urged to let gravity work on the shoulder to build a tolerance. She tolerated the exercise progressions well with no c/o pain & will further progress exercises as able pending irritability next session.    Skye is progressing well towards her goals.   Pt prognosis is Good.     Pt will continue to benefit from skilled outpatient physical therapy to address the deficits listed in the problem list box on initial evaluation, provide pt/family education and to maximize pt's level of independence in the home and community environment.     Pt's spiritual, cultural and educational needs considered and pt agreeable to plan of care and goals.     Anticipated barriers to physical therapy: none    Goals:   Short Term Goals: 6 weeks  1. Pt will be compliant with HEP 50% of prescribed amount. (Progressing, not met)  2. The pt to demo improvement in R shoulder PROM to by 80% of the L (Progressing, not met)  3.  The pt to demo tolerance to being out of the sling for 24 hours with pain <2/10 (Progressing, not met)     Long Term Goals: 24 weeks   Pt will be compliant with % of prescribed amount.  (Progressing, not met)  Pt will score 20% improvement on FOTO Shoulder Mobility  (Progressing, not met)  The pt to improvement in AROM of R shoulder within 80% of L shoulder to improve tolerance to OH movement  (Progressing, not met)  The pt to  Demo at least 4+/5 of C muscle testing to demo improvement in tolerance to activity (Progressing, not met)  The pt to tolerate lifting 10#  overhead to improve tolerance to ADLs and work related activities  (Progressing, not met)  The pt will report full participation in ADLs and IADLs without restrictions related to R Shoulder.   (Progressing, not met)    PLAN     Progress PROM per protocol    Donavon Valles, AMINTA Everett, PT, DPT, SCS

## 2024-10-21 ENCOUNTER — CLINICAL SUPPORT (OUTPATIENT)
Dept: REHABILITATION | Facility: HOSPITAL | Age: 49
End: 2024-10-21
Payer: COMMERCIAL

## 2024-10-21 DIAGNOSIS — M25.611 DECREASED RANGE OF MOTION OF RIGHT SHOULDER: ICD-10-CM

## 2024-10-21 DIAGNOSIS — R29.3 POSTURE ABNORMALITY: Primary | ICD-10-CM

## 2024-10-21 DIAGNOSIS — R29.898 UPPER EXTREMITY WEAKNESS: ICD-10-CM

## 2024-10-21 PROCEDURE — 97140 MANUAL THERAPY 1/> REGIONS: CPT

## 2024-10-21 PROCEDURE — 97110 THERAPEUTIC EXERCISES: CPT

## 2024-10-21 PROCEDURE — 97112 NEUROMUSCULAR REEDUCATION: CPT

## 2024-10-21 PROCEDURE — 97530 THERAPEUTIC ACTIVITIES: CPT

## 2024-10-21 NOTE — PROGRESS NOTES
"[facility]   Physical Therapy Treatment Note     Name: Skye Marte  Clinic Number: [id number]    Therapy Diagnosis:   Encounter Diagnoses   Name Primary?    Posture abnormality Yes    Decreased range of motion of right shoulder     Upper extremity weakness        Physician: Isidro Paredes MD    Visit Date: 10/21/2024  Physician Orders: PT Eval and Treat  Medical Diagnosis from Referral:   M19.011 (ICD-10-CM) - Arthritis of right acromioclavicular joint   M75.21 (ICD-10-CM) - Biceps tendinitis of right upper extremity   M75.101 (ICD-10-CM) - Tear of right rotator cuff, unspecified tear extent, unspecified whether traumatic   Evaluation Date: 9/3/2024  Authorization Period Expiration: 12/31/2024  Plan of Care Expiration: 11/26/2024  Progress Note Due: 10/01/2024  Visit # / Visits authorized: 11/20  FOTO: 1/3     Time In: 0536 pm  Time Out: 0630 pm  Total Billable Time: 54 minutes    FOTO next tx if appropriate    FOTO IE 9/12: 30  FOTO 2:   FOTO 3:   FOTO goal by #19: 63     Procedure by Reggie: 08/28/2024  1. Right shoulder arthroscopic rotator cuff repair.   2. Right shoulder open subpectoral biceps tenodesis  3. Right shoulder arthroscopic distal clavicle excision  4. Right shoulder arthroscopic subacromial decompression.   5. Right shoulder arthroscopic debridement labrum  Post-Op Precautions: Follow medium size rotator cuff repair      Precautions: Standard and post-op    SUBJECTIVE     Pt reports: "I've been reaching for things more with my arm, but I can see that it's crooked and I don't want it to be deformed"     She was compliant with home exercise program.  Response to previous treatment: no adverse effects  Functional change: improved sling management    Pain: 1/10  Location: right shoulder      OBJECTIVE     DOS: 8/28/24  POD: 7 weeks, 5 days as of 10/21    Objective Measures updated at progress report unless specified.     Shoulder Passive Range of Motion: " "10/10    Right Left   Flexion    130 deg 180 deg   ER at 0    62 deg  70 deg   ER at 90    NT 95 deg   IR    NT 50 deg     Treatment     Skye received the treatments listed below:      THERAPEUTIC EXERCISES to develop strength, endurance, ROM, flexibility, posture, and core stabilization for 15 minutes including:  Supine wand ER stretch @30deg 10" hold x5'  Supine AA wand chest press to flexion as tolerated x30    NP Today   Table slides (flx, scaption) x 3' each  Seated abd prop + MHP x5'    Pendulum hang 3 x 30"   Wrist ext, RD, flx, sup/pro 2# x 45 each  Flexion walkaway stretch 12 x 10"   Supine AA wand flexion to tolerance 3 x 10     MANUAL THERAPY TECHNIQUES including Joint mobilizations and Soft tissue Mobilization were applied to R shoulder for 10 minutes.    Assessment of shldr ROM  Elbow Flex, Subscap, & Lat release   Gr. I-III GH oscillations for guarding  Gr. I-III Posterior Mob     NP Today  Inferior GH mob (gr II)  PROM within protocol limits  Elbow flex/ext PROM    NEUROMUSCULAR RE-EDUCATION ACTIVITIES to improve Balance, Coordination, Kinesthetic, Sense, Proprioception, and Posture for 20 minutes.  The following were included:   UBE Lv 2.0 for 3 min fwd/bkwd;  Shrugs x 30  Scap retraction x 30  SL ER 0# 3 x 10 x 5"     NP Today  ER/IR walkouts GTB 3 x 10 each way  Ext c scap retraction OTB 3 x 10    THERAPEUTIC ACTIVITIES to improve dynamic and functional performance for 09 minutes including.  Landmines 3 x 10        Patient Education and Home Exercises     Home Exercises Provided and Patient Education Provided     Education provided:   - continue HEP    Written Home Exercises Provided: Patient instructed to cont prior HEP. Exercises were reviewed and Skye was able to demonstrate them prior to the end of the session.  Skye demonstrated good understanding of the education provided. See EMR under Patient Instructions for exercises provided during therapy sessions    ASSESSMENT     Skye " "presented with noted guarding in shoulder both at rest and during manual. This improved throughout session but pt was encouraged to avoid resting with arm pinned to side in "sling" position. She will benefit continued emphasis on passive range of motion and glenohumeral spin motor control.     Skye is progressing well towards her goals.   Pt prognosis is Good.     Pt will continue to benefit from skilled outpatient physical therapy to address the deficits listed in the problem list box on initial evaluation, provide pt/family education and to maximize pt's level of independence in the home and community environment.     Pt's spiritual, cultural and educational needs considered and pt agreeable to plan of care and goals.     Anticipated barriers to physical therapy: none    Goals:   Short Term Goals: 6 weeks  1. Pt will be compliant with HEP 50% of prescribed amount. MET  2. The pt to demo improvement in R shoulder PROM to by 80% of the L (Progressing, not met)  3.  The pt to demo tolerance to being out of the sling for 24 hours with pain <2/10 MET     Long Term Goals: 24 weeks   Pt will be compliant with % of prescribed amount.  (Progressing, not met)  Pt will score 20% improvement on FOTO Shoulder Mobility  (Progressing, not met)  The pt to improvement in AROM of R shoulder within 80% of L shoulder to improve tolerance to OH movement  (Progressing, not met)  The pt to  Demo at least 4+/5 of RTC muscle testing to demo improvement in tolerance to activity (Progressing, not met)  The pt to tolerate lifting 10# overhead to improve tolerance to ADLs and work related activities  (Progressing, not met)  The pt will report full participation in ADLs and IADLs without restrictions related to R Shoulder.   (Progressing, not met)    PLAN     Progress PROM per protocol    Donavon Valles, SPT    Lelo Everett, PT, DPT, SCS    "

## 2024-10-25 ENCOUNTER — CLINICAL SUPPORT (OUTPATIENT)
Dept: REHABILITATION | Facility: HOSPITAL | Age: 49
End: 2024-10-25
Payer: COMMERCIAL

## 2024-10-25 DIAGNOSIS — M25.611 DECREASED RANGE OF MOTION OF RIGHT SHOULDER: ICD-10-CM

## 2024-10-25 DIAGNOSIS — R29.898 UPPER EXTREMITY WEAKNESS: ICD-10-CM

## 2024-10-25 DIAGNOSIS — R29.3 POSTURE ABNORMALITY: Primary | ICD-10-CM

## 2024-10-25 PROCEDURE — 97112 NEUROMUSCULAR REEDUCATION: CPT | Mod: CQ

## 2024-10-25 PROCEDURE — 97140 MANUAL THERAPY 1/> REGIONS: CPT | Mod: CQ

## 2024-10-25 PROCEDURE — 97530 THERAPEUTIC ACTIVITIES: CPT | Mod: CQ

## 2024-10-25 PROCEDURE — 97110 THERAPEUTIC EXERCISES: CPT | Mod: CQ

## 2024-10-25 NOTE — PROGRESS NOTES
"[facility]   Physical Therapy Treatment Note     Name: Skye Marte  Clinic Number: [id number]    Therapy Diagnosis:   Encounter Diagnoses   Name Primary?    Posture abnormality Yes    Decreased range of motion of right shoulder     Upper extremity weakness      Physician: Isidro Paredes MD    Visit Date: 10/25/2024  Physician Orders: PT Eval and Treat  Medical Diagnosis from Referral:   M19.011 (ICD-10-CM) - Arthritis of right acromioclavicular joint   M75.21 (ICD-10-CM) - Biceps tendinitis of right upper extremity   M75.101 (ICD-10-CM) - Tear of right rotator cuff, unspecified tear extent, unspecified whether traumatic   Evaluation Date: 9/3/2024  Authorization Period Expiration: 12/31/2024  Plan of Care Expiration: 11/26/2024  Progress Note Due: 10/01/2024  Visit # / Visits authorized: 12/20  FOTO: 2/3     Time In: 1112  Time Out: 1214  Total Billable Time: 57 minutes    FOTO IE 9/12: 30  FOTO 10/25: 45  FOTO 3:   FOTO goal by #[email or web address]: 63     Procedure by Reggie: 08/28/2024  1. Right shoulder arthroscopic rotator cuff repair.   2. Right shoulder open subpectoral biceps tenodesis  3. Right shoulder arthroscopic distal clavicle excision  4. Right shoulder arthroscopic subacromial decompression.   5. Right shoulder arthroscopic debridement labrum  Post-Op Precautions: Follow medium size rotator cuff repair      Precautions: Standard and post-op    SUBJECTIVE     Pt reports: "My arm is sore all the time. 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     She was compliant with home exercise program.  Response to previous treatment: no adverse effects  Functional change: improved sling management    Pain: not verbalized/10  Location: right shoulder      OBJECTIVE     DOS: 8/28/24  POD: 8 weeks, 2 days as of 10/25    Objective Measures updated at progress report unless specified.     Shoulder Passive Range of " "Motion: 10/25    Right Left   Flexion    140 deg 180 deg   ER at 0    40 deg  80 deg   ER at 45   70 deg  90 deg   ER at 90     deg   IR at 45    20 deg 70 deg     Treatment     Skye received the treatments listed below:      THERAPEUTIC EXERCISES to develop strength, endurance, ROM, flexibility, posture, and core stabilization for 20 minutes including:    Flexion walkaway stretch 10 x 10"   Supine AA wand chest press 3 x 10  Supine AA wand flexion to tolerance 3 x 10   Supine wand ER stretch @30deg 10" hold x 4'    MANUAL THERAPY TECHNIQUES including Joint mobilizations and Soft tissue Mobilization were applied to R shoulder for 12 minutes.    Assessment of shldr ROM  Oscillations for pain guarding (gr I-II)  Inferior GH mob (gr III)  Posterior GH mobs (III)  PROM within protocol limits  Elbow Flex, Subscap, & Lat release - np    NEUROMUSCULAR RE-EDUCATION ACTIVITIES to improve Balance, Coordination, Kinesthetic, Sense, Proprioception, and Posture for 08 minutes.  The following were included:     ER walkouts OTB 10 x 10"   IR walkouts GTB 10 x 10"     NP Today  Ext c scap retraction OTB 3 x 10  Shrugs x 30  Scap retraction x 30  SL ER 0# 3 x 10 x 5"     THERAPEUTIC ACTIVITIES to improve dynamic and functional performance for 17 minutes including:    UBE lvl 3 x 4'/4' for UE/cardiovascular endurance  Attempted wall slides, but not ready at this time  Landmines 3 x 12        Patient Education and Home Exercises     Home Exercises Provided and Patient Education Provided     Education provided:   - continue HEP    Written Home Exercises Provided: Patient instructed to cont prior HEP. Exercises were reviewed and Skye was able to demonstrate them prior to the end of the session.  Skye demonstrated good understanding of the education provided. See EMR under Patient Instructions for exercises provided during therapy sessions    ASSESSMENT     Skye did well today. PROM/AAROM are progressing slowly with time; " she does better with AAROM compared to PROM. Updated objective data listed above. Attempted wall slides today, but she was unable to perform with good postural control; she will likely be ready to try again in 1-2 weeks. She was challenged by walkouts holds. Encouraged continued compliance with HEP over the weekend. No adverse effects reported p txStephie Cheung is progressing well towards her goals.   Pt prognosis is Good.     Pt will continue to benefit from skilled outpatient physical therapy to address the deficits listed in the problem list box on initial evaluation, provide pt/family education and to maximize pt's level of independence in the home and community environment.     Pt's spiritual, cultural and educational needs considered and pt agreeable to plan of care and goals.     Anticipated barriers to physical therapy: none    Goals:   Short Term Goals: 6 weeks  1. Pt will be compliant with HEP 50% of prescribed amount. MET  2. The pt to demo improvement in R shoulder PROM to by 80% of the L (Progressing, not met)  3.  The pt to demo tolerance to being out of the sling for 24 hours with pain <2/10 MET     Long Term Goals: 24 weeks   Pt will be compliant with % of prescribed amount.  (Progressing, not met)  Pt will score 20% improvement on FOTO Shoulder Mobility  (Progressing, not met)  The pt to improvement in AROM of R shoulder within 80% of L shoulder to improve tolerance to OH movement  (Progressing, not met)  The pt to  Demo at least 4+/5 of RTC muscle testing to demo improvement in tolerance to activity (Progressing, not met)  The pt to tolerate lifting 10# overhead to improve tolerance to ADLs and work related activities  (Progressing, not met)  The pt will report full participation in ADLs and IADLs without restrictions related to R Shoulder.   (Progressing, not met)    PLAN     Progress PROM per protocol    Viridiana Haney, PTA

## 2024-10-28 ENCOUNTER — CLINICAL SUPPORT (OUTPATIENT)
Dept: REHABILITATION | Facility: HOSPITAL | Age: 49
End: 2024-10-28
Payer: COMMERCIAL

## 2024-10-28 DIAGNOSIS — M25.611 DECREASED RANGE OF MOTION OF RIGHT SHOULDER: ICD-10-CM

## 2024-10-28 DIAGNOSIS — R29.898 UPPER EXTREMITY WEAKNESS: ICD-10-CM

## 2024-10-28 DIAGNOSIS — R29.3 POSTURE ABNORMALITY: Primary | ICD-10-CM

## 2024-10-28 PROCEDURE — 97140 MANUAL THERAPY 1/> REGIONS: CPT

## 2024-10-28 PROCEDURE — 97110 THERAPEUTIC EXERCISES: CPT

## 2024-10-28 PROCEDURE — 97530 THERAPEUTIC ACTIVITIES: CPT

## 2024-10-28 PROCEDURE — 97112 NEUROMUSCULAR REEDUCATION: CPT

## 2024-11-01 ENCOUNTER — CLINICAL SUPPORT (OUTPATIENT)
Dept: REHABILITATION | Facility: HOSPITAL | Age: 49
End: 2024-11-01
Payer: COMMERCIAL

## 2024-11-01 DIAGNOSIS — M25.611 DECREASED RANGE OF MOTION OF RIGHT SHOULDER: ICD-10-CM

## 2024-11-01 DIAGNOSIS — R29.898 UPPER EXTREMITY WEAKNESS: ICD-10-CM

## 2024-11-01 DIAGNOSIS — R29.3 POSTURE ABNORMALITY: Primary | ICD-10-CM

## 2024-11-01 PROCEDURE — 97140 MANUAL THERAPY 1/> REGIONS: CPT | Mod: CQ

## 2024-11-01 PROCEDURE — 97530 THERAPEUTIC ACTIVITIES: CPT | Mod: CQ

## 2024-11-01 PROCEDURE — 97112 NEUROMUSCULAR REEDUCATION: CPT | Mod: CQ

## 2024-11-01 PROCEDURE — 97110 THERAPEUTIC EXERCISES: CPT | Mod: CQ

## 2024-11-05 ENCOUNTER — CLINICAL SUPPORT (OUTPATIENT)
Dept: REHABILITATION | Facility: HOSPITAL | Age: 49
End: 2024-11-05
Payer: COMMERCIAL

## 2024-11-05 DIAGNOSIS — R29.3 POSTURE ABNORMALITY: Primary | ICD-10-CM

## 2024-11-05 DIAGNOSIS — M25.611 DECREASED RANGE OF MOTION OF RIGHT SHOULDER: ICD-10-CM

## 2024-11-05 DIAGNOSIS — R29.898 UPPER EXTREMITY WEAKNESS: ICD-10-CM

## 2024-11-05 PROCEDURE — 97530 THERAPEUTIC ACTIVITIES: CPT | Mod: CQ

## 2024-11-05 PROCEDURE — 97110 THERAPEUTIC EXERCISES: CPT | Mod: CQ

## 2024-11-05 PROCEDURE — 97112 NEUROMUSCULAR REEDUCATION: CPT | Mod: CQ

## 2024-11-05 PROCEDURE — 97140 MANUAL THERAPY 1/> REGIONS: CPT | Mod: CQ

## 2024-11-05 NOTE — PROGRESS NOTES
[facility]   Physical Therapy Treatment Note     Name: Skye Marte  [facility] Number: [id number]    Therapy Diagnosis:   Encounter Diagnoses   Name Primary?    Posture abnormality Yes    Decreased range of motion of right shoulder     Upper extremity weakness      Physician: Isidro Paredes MD    Visit Date: 11/5/2024  Physician Orders: PT Eval and Treat  Medical Diagnosis from Referral:   M19.011 (ICD-10-CM) - Arthritis of right acromioclavicular joint   M75.21 (ICD-10-CM) - Biceps tendinitis of right upper extremity   M75.101 (ICD-10-CM) - Tear of right rotator cuff, unspecified tear extent, unspecified whether traumatic   Evaluation Date: 9/3/2024  Authorization Period Expiration: 12/31/2024  Plan of Care Expiration: 11/26/2024  Progress Note Due: 10/01/2024  Visit # / Visits authorized: 15/20  FOTO: 2/3     Time In: 1703  Time Out: 1805  Total Billable Time: 61 minutes    FOTO IE 9/12: 30  FOTO 10/25: 45  FOTO 3:   FOTO goal by #[email or web address]: 63     Procedure by Reggie: 08/28/2024  1. Right shoulder arthroscopic rotator cuff repair.   2. Right shoulder open subpectoral biceps tenodesis  3. Right shoulder arthroscopic distal clavicle excision  4. Right shoulder arthroscopic subacromial decompression.   5. Right shoulder arthroscopic debridement labrum  Post-Op Precautions: Follow medium size rotator cuff repair      Precautions: Standard and post-op    SUBJECTIVE     Pt reports: min shldr soreness because she had more responsibilities at work yesterday/today. Reports consistent compliance with HEP.     She was compliant with home exercise program.  Response to previous treatment: no adverse effects  Functional change: improved sling management    Pain: not verbalized/10  Location: right shoulder      OBJECTIVE     DOS: 8/28/24  POD: 9 weeks, 6 days as of 11/5    Objective Measures updated at progress report unless specified.     Shoulder Passive Range of Motion: 11/5    Right Left  "  Flexion    145 deg 180 deg   ER at 0    65 deg  80 deg   ER at 45   71 deg  90 deg   ER at 90     deg   IR at 45    20 deg 70 deg     Treatment     Skye received the treatments listed below:      THERAPEUTIC EXERCISES to develop strength, endurance, ROM, flexibility, posture, and core stabilization for 20 minutes including:    Flexion walkaway stretch 10 x 10"   Supine AA wand flexion to tolerance 3 x 10   Supine wand ER stretch @30deg 10" hold x 4'  Pt education: updated HEP and issued printout    MANUAL THERAPY TECHNIQUES including Joint mobilizations and Soft tissue Mobilization were applied to R shoulder for 08 minutes.    Assessment of shldr ROM  Oscillations for pain guarding (gr I-II)  Inferior GH mob (gr III-IV)  Posterior GH mobs (III)  PROM within protocol limits  Scap pinning into OH flexion     NEUROMUSCULAR RE-EDUCATION ACTIVITIES to improve Balance, Coordination, Kinesthetic, Sense, Proprioception, and Posture for 18 minutes.  The following were included:     S/L ER to neutral 3 x 10 x 5"   Ext c scap retraction OTB 3 x 10  ER walkouts OTB 2 x 8 x 10"   IR walkouts GTB 2 x 8 x 10"     THERAPEUTIC ACTIVITIES to improve dynamic and functional performance for 15 minutes including:    Functional reaching:  Uni wall slide 3 x 8  Standing scaption in front of mirror 3 x 8    NP:  UBE lvl 3 x 4'/4' for UE/cardiovascular endurance  Landmines 3 x 12         Patient Education and Home Exercises     Home Exercises Provided and Patient Education Provided     Education provided:   - continue HEP    Written Home Exercises Provided: Patient instructed to cont prior HEP. Exercises were reviewed and Skye was able to demonstrate them prior to the end of the session.  Skye demonstrated good understanding of the education provided. See EMR under Patient Instructions for exercises provided during therapy sessions    ASSESSMENT     Skye did great today. PROM continues to improve with each week. She " demonstrated much improved postural control during wall slides today; therefore, we initiated standing scaption AROM with no compensatory shrug noted. Updated HEP, issued printout, and encouraged continued compliance. No adverse effects reported p txStephie Cheung is progressing well towards her goals.   Pt prognosis is Good.     Pt will continue to benefit from skilled outpatient physical therapy to address the deficits listed in the problem list box on initial evaluation, provide pt/family education and to maximize pt's level of independence in the home and community environment.     Pt's spiritual, cultural and educational needs considered and pt agreeable to plan of care and goals.     Anticipated barriers to physical therapy: none    Goals:   Short Term Goals: 6 weeks  1. Pt will be compliant with HEP 50% of prescribed amount. MET  2. The pt to demo improvement in R shoulder PROM to by 80% of the L (Progressing, not met)  3.  The pt to demo tolerance to being out of the sling for 24 hours with pain <2/10 MET     Long Term Goals: 24 weeks   Pt will be compliant with % of prescribed amount.  (Progressing, not met)  Pt will score 20% improvement on FOTO Shoulder Mobility  (Progressing, not met)  The pt to improvement in AROM of R shoulder within 80% of L shoulder to improve tolerance to OH movement  (Progressing, not met)  The pt to  Demo at least 4+/5 of RTC muscle testing to demo improvement in tolerance to activity (Progressing, not met)  The pt to tolerate lifting 10# overhead to improve tolerance to ADLs and work related activities  (Progressing, not met)  The pt will report full participation in ADLs and IADLs without restrictions related to R Shoulder.   (Progressing, not met)    PLAN     Progress PROM per protocol    Viridiana Haney, ALEXANDRA

## 2024-11-08 ENCOUNTER — CLINICAL SUPPORT (OUTPATIENT)
Dept: REHABILITATION | Facility: HOSPITAL | Age: 49
End: 2024-11-08
Payer: COMMERCIAL

## 2024-11-08 DIAGNOSIS — R29.898 UPPER EXTREMITY WEAKNESS: ICD-10-CM

## 2024-11-08 DIAGNOSIS — M25.611 DECREASED RANGE OF MOTION OF RIGHT SHOULDER: ICD-10-CM

## 2024-11-08 DIAGNOSIS — R29.3 POSTURE ABNORMALITY: Primary | ICD-10-CM

## 2024-11-08 PROCEDURE — 97140 MANUAL THERAPY 1/> REGIONS: CPT

## 2024-11-08 PROCEDURE — 97112 NEUROMUSCULAR REEDUCATION: CPT

## 2024-11-08 PROCEDURE — 97530 THERAPEUTIC ACTIVITIES: CPT

## 2024-11-08 PROCEDURE — 97110 THERAPEUTIC EXERCISES: CPT

## 2024-11-08 NOTE — PROGRESS NOTES
[facility]   Physical Therapy Treatment Note     Name: Skye Marte  [facility] Number: [id number]    Therapy Diagnosis:   Encounter Diagnoses   Name Primary?    Posture abnormality Yes    Decreased range of motion of right shoulder     Upper extremity weakness      Physician: Isidro Paredes MD    Visit Date: 11/8/2024  Physician Orders: PT Eval and Treat  Medical Diagnosis from Referral:   M19.011 (ICD-10-CM) - Arthritis of right acromioclavicular joint   M75.21 (ICD-10-CM) - Biceps tendinitis of right upper extremity   M75.101 (ICD-10-CM) - Tear of right rotator cuff, unspecified tear extent, unspecified whether traumatic   Evaluation Date: 9/3/2024  Authorization Period Expiration: 12/31/2024  Plan of Care Expiration: 11/26/2024  Progress Note Due: 10/01/2024  Visit # / Visits authorized: 16/20  FOTO: 2/3     Time In: 1200 pm  Time Out: 0100 pm  Total Billable Time: 60 minutes    FOTO IE 9/12: 30  FOTO 10/25: 45  FOTO 3:   FOTO goal by #19: 63     Procedure by Reggie: 08/28/2024  1. Right shoulder arthroscopic rotator cuff repair.   2. Right shoulder open subpectoral biceps tenodesis  3. Right shoulder arthroscopic distal clavicle excision  4. Right shoulder arthroscopic subacromial decompression.   5. Right shoulder arthroscopic debridement labrum  Post-Op Precautions: Follow medium size rotator cuff repair      Precautions: Standard and post-op    SUBJECTIVE     Pt reports: has been extra busy and hasn't been able to exercise as much, so she feels stiff    She was compliant with home exercise program.  Response to previous treatment: no adverse effects  Functional change: improved sling management    Pain: not verbalized/10  Location: right shoulder      OBJECTIVE     DOS: 8/28/24  POD: 9 weeks, 6 days as of 11/5    Objective Measures updated at progress report unless specified.     Shoulder Passive Range of Motion: 11/5    Right Left   Flexion    145 deg 180 deg   ER at  "0    65 deg  80 deg   ER at 45   71 deg  90 deg   ER at 90     deg   IR at 45    20 deg 70 deg     Treatment     Skye received the treatments listed below:      THERAPEUTIC EXERCISES to develop strength, endurance, ROM, flexibility, posture, and core stabilization for 15 minutes including:  Flexion walkaway stretch 10 x 10"   Seated abd prop + MHP 7 min   Supine AA wand flexion to tolerance 3 x 10   Supine wand ER stretch @30deg 10" hold x 4'  Pt education: updated HEP and issued printout    MANUAL THERAPY TECHNIQUES including Joint mobilizations and Soft tissue Mobilization were applied to R shoulder for 15 minutes.  Assessment of shldr ROM  Oscillations for pain guarding (gr I-II)  Inferior GH mob (gr III-IV)  Posterior GH mobs (III)  PROM within protocol limits  Scap pinning into OH flexion     NEUROMUSCULAR RE-EDUCATION ACTIVITIES to improve Balance, Coordination, Kinesthetic, Sense, Proprioception, and Posture for 20 minutes.  The following were included:   S/L ER to neutral 3 x 10 x 5"   Ext c scap retraction OTB 3 x 10- NP  ER walkouts OTB 2 x 8 x 10"   IR walkouts GTB 2 x 8 x 10"     THERAPEUTIC ACTIVITIES to improve dynamic and functional performance for 10 minutes including:  UBE lvl 3 x 4'/4' for UE/cardiovascular endurance  Functional reaching:  Uni wall slide 3 x 8- NP  Standing scaption in front of mirror 3 x 8- NP  Landmines 3 x 12         Patient Education and Home Exercises     Home Exercises Provided and Patient Education Provided     Education provided:   - continue HEP    Written Home Exercises Provided: Patient instructed to cont prior HEP. Exercises were reviewed and Skye was able to demonstrate them prior to the end of the session.  Skye demonstrated good understanding of the education provided. See EMR under Patient Instructions for exercises provided during therapy sessions    ASSESSMENT     Skye presented with decreased passive range of motion and joint mobility compared to " last session. This was restored with manual interventions and passive/active assisted range of motion. She responded well to continued RTC activation with restoration of active range following this. Will continue to progress as able.     Skye is progressing well towards her goals.   Pt prognosis is Good.     Pt will continue to benefit from skilled outpatient physical therapy to address the deficits listed in the problem list box on initial evaluation, provide pt/family education and to maximize pt's level of independence in the home and community environment.     Pt's spiritual, cultural and educational needs considered and pt agreeable to plan of care and goals.     Anticipated barriers to physical therapy: none    Goals:   Short Term Goals: 6 weeks  1. Pt will be compliant with HEP 50% of prescribed amount. MET  2. The pt to demo improvement in R shoulder PROM to by 80% of the L (Progressing, not met)  3.  The pt to demo tolerance to being out of the sling for 24 hours with pain <2/10 MET     Long Term Goals: 24 weeks   Pt will be compliant with % of prescribed amount.  (Progressing, not met)  Pt will score 20% improvement on FOTO Shoulder Mobility  (Progressing, not met)  The pt to improvement in AROM of R shoulder within 80% of L shoulder to improve tolerance to OH movement  (Progressing, not met)  The pt to  Demo at least 4+/5 of RTC muscle testing to demo improvement in tolerance to activity (Progressing, not met)  The pt to tolerate lifting 10# overhead to improve tolerance to ADLs and work related activities  (Progressing, not met)  The pt will report full participation in ADLs and IADLs without restrictions related to R Shoulder.   (Progressing, not met)    PLAN     Progress PROM per protocol    Lelo Everett, PT, DPT, SCS

## 2024-11-09 ENCOUNTER — PATIENT MESSAGE (OUTPATIENT)
Dept: SPORTS MEDICINE | Facility: CLINIC | Age: 49
End: 2024-11-09
Payer: COMMERCIAL

## 2024-11-12 ENCOUNTER — CLINICAL SUPPORT (OUTPATIENT)
Dept: REHABILITATION | Facility: HOSPITAL | Age: 49
End: 2024-11-12
Payer: COMMERCIAL

## 2024-11-12 DIAGNOSIS — M25.611 DECREASED RANGE OF MOTION OF RIGHT SHOULDER: ICD-10-CM

## 2024-11-12 DIAGNOSIS — R29.3 POSTURE ABNORMALITY: Primary | ICD-10-CM

## 2024-11-12 DIAGNOSIS — R29.898 UPPER EXTREMITY WEAKNESS: ICD-10-CM

## 2024-11-12 PROCEDURE — 97530 THERAPEUTIC ACTIVITIES: CPT | Mod: CQ

## 2024-11-12 PROCEDURE — 97110 THERAPEUTIC EXERCISES: CPT | Mod: CQ

## 2024-11-12 PROCEDURE — 97112 NEUROMUSCULAR REEDUCATION: CPT | Mod: CQ

## 2024-11-12 PROCEDURE — 97140 MANUAL THERAPY 1/> REGIONS: CPT | Mod: CQ

## 2024-11-12 NOTE — PROGRESS NOTES
[facility]   Physical Therapy Treatment Note     Name: Skye Marte  [facility] Number: [id number]    Therapy Diagnosis:   Encounter Diagnoses   Name Primary?    Posture abnormality Yes    Decreased range of motion of right shoulder     Upper extremity weakness      Physician: Isidro Paredes MD    Visit Date: 11/12/2024  Physician Orders: PT Eval and Treat  Medical Diagnosis from Referral:   M19.011 (ICD-10-CM) - Arthritis of right acromioclavicular joint   M75.21 (ICD-10-CM) - Biceps tendinitis of right upper extremity   M75.101 (ICD-10-CM) - Tear of right rotator cuff, unspecified tear extent, unspecified whether traumatic   Evaluation Date: 9/3/2024  Authorization Period Expiration: 12/31/2024  Plan of Care Expiration: 11/26/2024  Progress Note Due: 10/01/2024  Visit # / Visits authorized: 17/40  FOTO: 2/3     Time In: 1704  Time Out: 1807  Total Billable Time: 60 minutes    FOTO IE 9/12: 30  FOTO 10/25: 45  FOTO 3:   FOTO goal by #19: 63     Procedure by Reggie: 08/28/2024  1. Right shoulder arthroscopic rotator cuff repair.   2. Right shoulder open subpectoral biceps tenodesis  3. Right shoulder arthroscopic distal clavicle excision  4. Right shoulder arthroscopic subacromial decompression.   5. Right shoulder arthroscopic debridement labrum  Post-Op Precautions: Follow medium size rotator cuff repair      Precautions: Standard and post-op    SUBJECTIVE     Pt reports: increased soreness after another lizard crawled on her.    She was compliant with home exercise program.  Response to previous treatment: no adverse effects  Functional change: improved sling management    Pain: not verbalized/10  Location: right shoulder      OBJECTIVE     DOS: 8/28/24  POD: 10 weeks, 6 days as of 11/12    Objective Measures updated at progress report unless specified.     Shoulder Passive Range of Motion: 11/5    Right Left   Flexion    145 deg 180 deg   ER at 0    65 deg  80 deg   ER at  "45   71 deg  90 deg   ER at 90     deg   IR at 45    20 deg 70 deg     Treatment     Skye received the treatments listed below:      THERAPEUTIC EXERCISES to develop strength, endurance, ROM, flexibility, posture, and core stabilization for 14 minutes including:    Flexion walkaway stretch 10 x 10"  Seated abd prop + MHP 7 min - np  Supine AA wand flexion to tolerance 3 x 10   Supine wand ER stretch @30deg 10" hold x 4'  Pt education: reinforced HEP    MANUAL THERAPY TECHNIQUES including Joint mobilizations and Soft tissue Mobilization were applied to R shoulder for 10 minutes.    Assessment of shldr ROM  Oscillations for pain guarding (gr I-II)  Inferior GH mob (gr III-IV)  Posterior GH mobs (III)  PROM within protocol limits  Scap pinning into OH flexion     NEUROMUSCULAR RE-EDUCATION ACTIVITIES to improve Balance, Coordination, Kinesthetic, Sense, Proprioception, and Posture for 16 minutes.  The following were included:     S/L ER to neutral 3 x 10 x 5"   Ext c scap retraction OTB 3 x 10 - NP  ER walkouts OTB 2 x 12 x 5"   IR walkouts GTB 2 x 12 x 5"     THERAPEUTIC ACTIVITIES to improve dynamic and functional performance for 20 minutes including:    UBE lvl 5 x 4'/4' for UE/cardiovascular endurance  Functional reaching:  Uni wall slide 3 x 8  Standing scaption in front of mirror 1# 3 x 8  Landmines 3 x 12 - np        Patient Education and Home Exercises     Home Exercises Provided and Patient Education Provided     Education provided:   - continue HEP    Written Home Exercises Provided: Patient instructed to cont prior HEP. Exercises were reviewed and Skye was able to demonstrate them prior to the end of the session.  Skye demonstrated good understanding of the education provided. See EMR under Patient Instructions for exercises provided during therapy sessions    ASSESSMENT     Skye did well today. Despite her increased irritability reported upon entry, she displayed good carryover of ROM. Pt was " able to progress standing scaption with 1# weight and good postural control noted. Will assess response to today's tx and continue as appropriate. No adverse effects reported p tx.     Skye is progressing well towards her goals.   Pt prognosis is Good.     Pt will continue to benefit from skilled outpatient physical therapy to address the deficits listed in the problem list box on initial evaluation, provide pt/family education and to maximize pt's level of independence in the home and community environment.     Pt's spiritual, cultural and educational needs considered and pt agreeable to plan of care and goals.     Anticipated barriers to physical therapy: none    Goals:   Short Term Goals: 6 weeks  1. Pt will be compliant with HEP 50% of prescribed amount. MET  2. The pt to demo improvement in R shoulder PROM to by 80% of the L (Progressing, not met)  3.  The pt to demo tolerance to being out of the sling for 24 hours with pain <2/10 MET     Long Term Goals: 24 weeks   Pt will be compliant with % of prescribed amount.  (Progressing, not met)  Pt will score 20% improvement on FOTO Shoulder Mobility  (Progressing, not met)  The pt to improvement in AROM of R shoulder within 80% of L shoulder to improve tolerance to OH movement  (Progressing, not met)  The pt to  Demo at least 4+/5 of RTC muscle testing to demo improvement in tolerance to activity (Progressing, not met)  The pt to tolerate lifting 10# overhead to improve tolerance to ADLs and work related activities  (Progressing, not met)  The pt will report full participation in ADLs and IADLs without restrictions related to R Shoulder.   (Progressing, not met)    PLAN     Progress PROM per protocol    Viridiana Haney PTA

## 2024-11-15 ENCOUNTER — CLINICAL SUPPORT (OUTPATIENT)
Dept: REHABILITATION | Facility: HOSPITAL | Age: 49
End: 2024-11-15
Payer: COMMERCIAL

## 2024-11-15 DIAGNOSIS — R29.3 POSTURE ABNORMALITY: Primary | ICD-10-CM

## 2024-11-15 DIAGNOSIS — R29.898 UPPER EXTREMITY WEAKNESS: ICD-10-CM

## 2024-11-15 DIAGNOSIS — M25.611 DECREASED RANGE OF MOTION OF RIGHT SHOULDER: ICD-10-CM

## 2024-11-15 PROCEDURE — 97140 MANUAL THERAPY 1/> REGIONS: CPT | Mod: CQ

## 2024-11-15 PROCEDURE — 97110 THERAPEUTIC EXERCISES: CPT | Mod: CQ

## 2024-11-15 PROCEDURE — 97112 NEUROMUSCULAR REEDUCATION: CPT | Mod: CQ

## 2024-11-15 PROCEDURE — 97530 THERAPEUTIC ACTIVITIES: CPT | Mod: CQ

## 2024-11-15 NOTE — PROGRESS NOTES
[facility]   Physical Therapy Treatment Note     Name: Skye Marte  [facility] Number: [id number]    Therapy Diagnosis:   Encounter Diagnoses   Name Primary?    Posture abnormality Yes    Decreased range of motion of right shoulder     Upper extremity weakness      Physician: Isidro Paredes MD    Visit Date: 11/15/2024  Physician Orders: PT Eval and Treat  Medical Diagnosis from Referral:   M19.011 (ICD-10-CM) - Arthritis of right acromioclavicular joint   M75.21 (ICD-10-CM) - Biceps tendinitis of right upper extremity   M75.101 (ICD-10-CM) - Tear of right rotator cuff, unspecified tear extent, unspecified whether traumatic   Evaluation Date: 9/3/2024  Authorization Period Expiration: 12/31/2024  Plan of Care Expiration: 11/26/2024  Progress Note Due: 10/01/2024  Visit # / Visits authorized: 18/40  FOTO: 2/3     Time In: 0905  Time Out: 1007  Total Billable Time: 60 minutes    FOTO IE 9/12: 30  FOTO 10/25: 45  FOTO 3:   FOTO goal by #19: 63     Procedure by Reggie: 08/28/2024  1. Right shoulder arthroscopic rotator cuff repair.   2. Right shoulder open subpectoral biceps tenodesis  3. Right shoulder arthroscopic distal clavicle excision  4. Right shoulder arthroscopic subacromial decompression.   5. Right shoulder arthroscopic debridement labrum  Post-Op Precautions: Follow medium size rotator cuff repair      Precautions: Standard and post-op    SUBJECTIVE     Pt reports: continued soreness. Reports consistent compliance with HEP.     She was compliant with home exercise program.  Response to previous treatment: no adverse effects  Functional change: improved sling management    Pain: 2/10 during AROM flexion  Location: right shoulder      OBJECTIVE     DOS: 8/28/24  POD: 11 weeks, 2 days as of 11/15    Objective Measures updated at progress report unless specified.     Shoulder Passive Range of Motion: 11/5    Right Left   Flexion    145 deg 180 deg   ER at 0    65 deg  80  "deg   ER at 45   71 deg  90 deg   ER at 90     deg   IR at 45    20 deg 70 deg     Treatment     Skye received the treatments listed below:      THERAPEUTIC EXERCISES to develop strength, endurance, ROM, flexibility, posture, and core stabilization for 12 minutes including:    Flexion walkaway stretch 10 x 10" - np  Seated abd prop + MHP 7 min - np  Supine AA wand flexion to tolerance 3 x 10   Supine wand ER stretch @30deg 10" hold x 4'  Pt education: reinforced HEP    MANUAL THERAPY TECHNIQUES including Joint mobilizations and Soft tissue Mobilization were applied to R shoulder for 10 minutes.    Assessment of shldr ROM  Oscillations for pain guarding (gr I-II)  Inferior GH mob (gr III-IV)  Posterior GH mobs (III)  PROM within protocol limits  Scap pinning into OH flexion     NEUROMUSCULAR RE-EDUCATION ACTIVITIES to improve Balance, Coordination, Kinesthetic, Sense, Proprioception, and Posture for 24 minutes.  The following were included:     S/L ER to neutral 1# 3 x 10 x 5"   Ext c scap retraction GTB 3 x 10   90deg flx ER walkouts YTB 2 x 10 x 3"   90deg flx IR walkouts YB 2 x 10 x 3"   Prone A (lvl 3) 2 x 10 x 10"     THERAPEUTIC ACTIVITIES to improve dynamic and functional performance for 14 minutes including:    UBE lvl 5 x 4'/4' for UE/cardiovascular endurance  Functional reaching:  Uni wall slide 3 x 12  Standing scaption in front of mirror 1# 3 x 8 - np  Landmines 3 x 12 - np        Patient Education and Home Exercises     Home Exercises Provided and Patient Education Provided     Education provided:   - continue HEP    Written Home Exercises Provided: Patient instructed to cont prior HEP. Exercises were reviewed and Skye was able to demonstrate them prior to the end of the session.  Skye demonstrated good understanding of the education provided. See EMR under Patient Instructions for exercises provided during therapy sessions    ASSESSMENT     Skye did well today. Despite her continued " soreness reported upon entry, she displayed good carryover of ROM. Fatigue reported during progressed S/L ER and walkouts. Will assess response to today's tx and continue as appropriate. No adverse effects reported p tx.     Skye is progressing well towards her goals.   Pt prognosis is Good.     Pt will continue to benefit from skilled outpatient physical therapy to address the deficits listed in the problem list box on initial evaluation, provide pt/family education and to maximize pt's level of independence in the home and community environment.     Pt's spiritual, cultural and educational needs considered and pt agreeable to plan of care and goals.     Anticipated barriers to physical therapy: none    Goals:   Short Term Goals: 6 weeks  1. Pt will be compliant with HEP 50% of prescribed amount. MET  2. The pt to demo improvement in R shoulder PROM to by 80% of the L (Progressing, not met)  3.  The pt to demo tolerance to being out of the sling for 24 hours with pain <2/10 MET     Long Term Goals: 24 weeks   Pt will be compliant with % of prescribed amount.  (Progressing, not met)  Pt will score 20% improvement on FOTO Shoulder Mobility  (Progressing, not met)  The pt to improvement in AROM of R shoulder within 80% of L shoulder to improve tolerance to OH movement  (Progressing, not met)  The pt to  Demo at least 4+/5 of RTC muscle testing to demo improvement in tolerance to activity (Progressing, not met)  The pt to tolerate lifting 10# overhead to improve tolerance to ADLs and work related activities  (Progressing, not met)  The pt will report full participation in ADLs and IADLs without restrictions related to R Shoulder.   (Progressing, not met)    PLAN     Progress PROM per protocol    Viridiana Haney, PTA

## 2024-11-18 ENCOUNTER — CLINICAL SUPPORT (OUTPATIENT)
Dept: REHABILITATION | Facility: HOSPITAL | Age: 49
End: 2024-11-18
Payer: COMMERCIAL

## 2024-11-18 DIAGNOSIS — R29.3 POSTURE ABNORMALITY: Primary | ICD-10-CM

## 2024-11-18 DIAGNOSIS — R29.898 UPPER EXTREMITY WEAKNESS: ICD-10-CM

## 2024-11-18 DIAGNOSIS — M25.611 DECREASED RANGE OF MOTION OF RIGHT SHOULDER: ICD-10-CM

## 2024-11-18 PROCEDURE — 97140 MANUAL THERAPY 1/> REGIONS: CPT

## 2024-11-18 PROCEDURE — 97112 NEUROMUSCULAR REEDUCATION: CPT

## 2024-11-18 PROCEDURE — 97110 THERAPEUTIC EXERCISES: CPT

## 2024-11-18 NOTE — PROGRESS NOTES
[facility]   Physical Therapy Treatment Note     Name: Skye Marte  [facility] Number: [id number]    Therapy Diagnosis:   Encounter Diagnoses   Name Primary?    Posture abnormality Yes    Decreased range of motion of right shoulder     Upper extremity weakness      Physician: Isidro Paredes MD    Visit Date: 11/18/2024  Physician Orders: PT Eval and Treat  Medical Diagnosis from Referral:   M19.011 (ICD-10-CM) - Arthritis of right acromioclavicular joint   M75.21 (ICD-10-CM) - Biceps tendinitis of right upper extremity   M75.101 (ICD-10-CM) - Tear of right rotator cuff, unspecified tear extent, unspecified whether traumatic   Evaluation Date: 9/3/2024  Authorization Period Expiration: 12/31/2024  Plan of Care Expiration: 11/26/2024  Progress Note Due: 10/01/2024  Visit # / Visits authorized: 19/40  FOTO: 2/3     Time In: 0530 pm  Time Out: 0630 pm  Total Billable Time: 60 minutes    FOTO IE 9/12: 30  FOTO 10/25: 45  FOTO 3:   FOTO goal by #19: 63     Procedure by Reggie: 08/28/2024  1. Right shoulder arthroscopic rotator cuff repair.   2. Right shoulder open subpectoral biceps tenodesis  3. Right shoulder arthroscopic distal clavicle excision  4. Right shoulder arthroscopic subacromial decompression.   5. Right shoulder arthroscopic debridement labrum  Post-Op Precautions: Follow medium size rotator cuff repair      Precautions: Standard and post-op    SUBJECTIVE     Pt reports: feeling okay today    She was compliant with home exercise program.  Response to previous treatment: no adverse effects  Functional change: improved sling management    Pain: 2/10 during AROM flexion  Location: right shoulder      OBJECTIVE     DOS: 8/28/24  POD: 11 weeks, 2 days as of 11/15    Objective Measures updated at progress report unless specified.     Shoulder Passive Range of Motion: 11/5    Right Left   Flexion    145 deg 180 deg   ER at 0    65 deg  80 deg   ER at 45   71 deg  90 deg   ER  "at 90     deg   IR at 45    20 deg 70 deg     Active Range of Motion L shoulder:   Flexion: 120 (shrug after 100)   Functional ER: C2   Functional IR: PSIS      Treatment     Skye received the treatments listed below:      THERAPEUTIC EXERCISES to develop strength, endurance, ROM, flexibility, posture, and core stabilization for 20 minutes including:  Supine AA wand flexion to tolerance 3 x 10   Supine wand ER stretch @30deg 10" hold x 4'  Picnic ER to flexion; 30 x     NP Today:   Pt education: reinforced HEP  Flexion walkaway stretch 10 x 10" - np  Seated abd prop + MHP 7 min - np    MANUAL THERAPY TECHNIQUES including Joint mobilizations and Soft tissue Mobilization were applied to R shoulder for 15 minutes.  Assessment of shldr ROM  Oscillations for pain guarding (gr I-II)  Inferior GH mob (gr III-IV)  Posterior GH mobs (III)  PROM within protocol limits  Scap pinning into OH flexion     NEUROMUSCULAR RE-EDUCATION ACTIVITIES to improve Balance, Coordination, Kinesthetic, Sense, Proprioception, and Posture for 25 minutes.  The following were included:   Walkouts ER/IR ; GTB; 1 x 15 ea  ER/IR Genie; OTB; 3 x 10   S/L ER to neutral 1# 3 x 10 x 5"   Supine Handcuffs; YTB; 4 x 10   Prone A (lvl 3) 2 x 10 x 10"     NP Today:   90deg flx ER walkouts YTB 2 x 10 x 3"   90deg flx IR walkouts YB 2 x 10 x 3"   Ext c scap retraction GTB 3 x 10     THERAPEUTIC ACTIVITIES to improve dynamic and functional performance for 00 minutes including:  UBE lvl 5 x 4'/4' for UE/cardiovascular endurance  Functional reaching:  Uni wall slide 3 x 12  Standing scaption in front of mirror 1# 3 x 8   Landmines 3 x 12         Patient Education and Home Exercises     Home Exercises Provided and Patient Education Provided     Education provided:   - continue HEP    Written Home Exercises Provided: Patient instructed to cont prior HEP. Exercises were reviewed and Skye was able to demonstrate them prior to the end of the session.  Skye " demonstrated good understanding of the education provided. See EMR under Patient Instructions for exercises provided during therapy sessions    ASSESSMENT     Skye presented with increased soft tissue restriction in her lat and pec. This improved with STM and contract relax stretch for lat. She responded well to floor based mobility work and will benefit continued emphasis on this for terminal flexion range of motion as well as continued RTC loading as appropriate for this stage post-op  Skye is progressing well towards her goals.   Pt prognosis is Good.     Pt will continue to benefit from skilled outpatient physical therapy to address the deficits listed in the problem list box on initial evaluation, provide pt/family education and to maximize pt's level of independence in the home and community environment.     Pt's spiritual, cultural and educational needs considered and pt agreeable to plan of care and goals.     Anticipated barriers to physical therapy: none    Goals:   Short Term Goals: 6 weeks  1. Pt will be compliant with HEP 50% of prescribed amount. MET  2. The pt to demo improvement in R shoulder PROM to by 80% of the L (Progressing, not met)  3.  The pt to demo tolerance to being out of the sling for 24 hours with pain <2/10 MET     Long Term Goals: 24 weeks   Pt will be compliant with % of prescribed amount.  (Progressing, not met)  Pt will score 20% improvement on FOTO Shoulder Mobility  (Progressing, not met)  The pt to improvement in AROM of R shoulder within 80% of L shoulder to improve tolerance to OH movement  (Progressing, not met)  The pt to  Demo at least 4+/5 of RTC muscle testing to demo improvement in tolerance to activity (Progressing, not met)  The pt to tolerate lifting 10# overhead to improve tolerance to ADLs and work related activities  (Progressing, not met)  The pt will report full participation in ADLs and IADLs without restrictions related to R Shoulder.   (Progressing,  not met)    PLAN     Progress PROM per protocol    Lelo Everett, PT, DPT, SCS

## 2024-11-21 ENCOUNTER — OFFICE VISIT (OUTPATIENT)
Dept: SPORTS MEDICINE | Facility: CLINIC | Age: 49
End: 2024-11-21
Payer: COMMERCIAL

## 2024-11-21 VITALS
HEIGHT: 63 IN | HEART RATE: 72 BPM | WEIGHT: 127 LBS | DIASTOLIC BLOOD PRESSURE: 68 MMHG | BODY MASS INDEX: 22.5 KG/M2 | SYSTOLIC BLOOD PRESSURE: 119 MMHG

## 2024-11-21 DIAGNOSIS — Z98.890 S/P ROTATOR CUFF REPAIR: Primary | ICD-10-CM

## 2024-11-21 PROCEDURE — 3078F DIAST BP <80 MM HG: CPT | Mod: CPTII,S$GLB,, | Performed by: ORTHOPAEDIC SURGERY

## 2024-11-21 PROCEDURE — 99999 PR PBB SHADOW E&M-EST. PATIENT-LVL IV: CPT | Mod: PBBFAC,,, | Performed by: ORTHOPAEDIC SURGERY

## 2024-11-21 PROCEDURE — 3074F SYST BP LT 130 MM HG: CPT | Mod: CPTII,S$GLB,, | Performed by: ORTHOPAEDIC SURGERY

## 2024-11-21 PROCEDURE — 1159F MED LIST DOCD IN RCRD: CPT | Mod: CPTII,S$GLB,, | Performed by: ORTHOPAEDIC SURGERY

## 2024-11-21 PROCEDURE — 99024 POSTOP FOLLOW-UP VISIT: CPT | Mod: S$GLB,,, | Performed by: ORTHOPAEDIC SURGERY

## 2024-11-21 NOTE — PROGRESS NOTES
"CC: Right shoulder post op 12 weeks    Patient is here for her 12 week post op appointment s/p below and is doing well. Patient is doing PT at [facility] and is progressing well.     DATE OF SURGERY:  8/28/2024      PREOPERATIVE DIAGNOSES:   1. Right shoulder rotator cuff tear.   2. Right shoulder biceps tendinopathy  3. Right shoulder AC joint arthritis  4. Right shoulder labral tear     POSTOPERATIVE DIAGNOSES:   1. Right shoulder rotator cuff tear.   2. Right shoulder biceps tendinopathy  3. Right shoulder AC joint arthritis  4. Right shoulder labral tear     PROCEDURE:   1. Right shoulder arthroscopic rotator cuff repair.   2. Right shoulder open subpectoral biceps tenodesis  3. Right shoulder arthroscopic distal clavicle excision  4. Right shoulder arthroscopic subacromial decompression.   5. Right shoulder arthroscopic debridement labrum     SURGEON: Isidro Paredes M.D.     Pain well tolerated on pain medication  Sling in place  No issues reported    Review of Systems   Constitution: Negative. Negative for chills, fever and night sweats.    Cardiovascular: Negative for chest pain and syncope.   Respiratory: Negative for cough and shortness of breath.    Gastrointestinal: Negative for abdominal pain and bowel incontinence.      PE:    /68   Pulse 72   Ht 5' 3" (1.6 m)   Wt 57.6 kg (126 lb 15.8 oz)   LMP 11/28/2019   BMI 22.49 kg/m²      Right shoulder    Incision healed  No sign of infection  Swelling resolved  Compartments soft  Neurovascular status intact in extremity    PROM  Forward elevation 5 degrees  External rotation 5 degrees    Assessment:  6 weeks s/p right shoulder arthroscopic rotator cuff repair, subacromial decompression, distal clavicle excision, labral debridement, open subpectoral biceps tenodesis    Plan:  1. Continue PT   2. Discontinue sling  3. F/u in 6 weeks.       All questions were answered. Instructed patient to call with questions or concerns in the interim. "

## 2024-11-22 ENCOUNTER — CLINICAL SUPPORT (OUTPATIENT)
Dept: REHABILITATION | Facility: HOSPITAL | Age: 49
End: 2024-11-22
Payer: COMMERCIAL

## 2024-11-22 DIAGNOSIS — R29.3 POSTURE ABNORMALITY: Primary | ICD-10-CM

## 2024-11-22 DIAGNOSIS — R29.898 UPPER EXTREMITY WEAKNESS: ICD-10-CM

## 2024-11-22 DIAGNOSIS — M25.611 DECREASED RANGE OF MOTION OF RIGHT SHOULDER: ICD-10-CM

## 2024-11-22 PROCEDURE — 97140 MANUAL THERAPY 1/> REGIONS: CPT | Mod: CQ

## 2024-11-22 PROCEDURE — 97112 NEUROMUSCULAR REEDUCATION: CPT | Mod: CQ

## 2024-11-22 PROCEDURE — 97530 THERAPEUTIC ACTIVITIES: CPT | Mod: CQ

## 2024-11-22 PROCEDURE — 97110 THERAPEUTIC EXERCISES: CPT | Mod: CQ

## 2024-11-22 NOTE — PROGRESS NOTES
[facility]   Physical Therapy Treatment Note     Name: Skye Marte  [facility] Number: [id number]    Therapy Diagnosis:   Encounter Diagnoses   Name Primary?    Posture abnormality Yes    Decreased range of motion of right shoulder     Upper extremity weakness      Physician: Isidro Paredes MD    Visit Date: 11/22/2024  Physician Orders: PT Eval and Treat  Medical Diagnosis from Referral:   M19.011 (ICD-10-CM) - Arthritis of right acromioclavicular joint   M75.21 (ICD-10-CM) - Biceps tendinitis of right upper extremity   M75.101 (ICD-10-CM) - Tear of right rotator cuff, unspecified tear extent, unspecified whether traumatic   Evaluation Date: 9/3/2024  Authorization Period Expiration: 12/31/2024  Plan of Care Expiration: 11/26/2024  Progress Note Due: 10/01/2024  Visit # / Visits authorized: 20/40  FOTO: 2/3     Time In: 0903  Time Out: 1001  Total Billable Time: 55 minutes    FOTO IE 9/12: 30  FOTO 10/25: 45  FOTO 3:   FOTO goal by #19: 63     Procedure by Reggie: 08/28/2024  1. Right shoulder arthroscopic rotator cuff repair.   2. Right shoulder open subpectoral biceps tenodesis  3. Right shoulder arthroscopic distal clavicle excision  4. Right shoulder arthroscopic subacromial decompression.   5. Right shoulder arthroscopic debridement labrum  Post-Op Precautions: Follow medium size rotator cuff repair      Precautions: Standard and post-op    SUBJECTIVE     Pt reports: no c/o shldr pain upon entry. Good report from MD this week.     She was compliant with home exercise program.  Response to previous treatment: no adverse effects  Functional change: improved sling management    Pain: 2/10 during AROM flexion  Location: right shoulder      OBJECTIVE     DOS: 8/28/24  POD: 12 weeks, 2 days as of 11/22    Objective Measures updated at progress report unless specified.     Shoulder Passive Range of Motion: 11/22    Right Left   Flexion    160 deg 180 deg   ER at 0    65 deg  80  "deg   ER at 45   83 deg  90 deg   ER at 90     deg   IR at 45    20 deg 70 deg     Active Range of Motion L shoulder:   Flexion: 120 (shrug after 100)   Functional ER: C2   Functional IR: PSIS      Treatment     Skye received the treatments listed below:      THERAPEUTIC EXERCISES to develop strength, endurance, ROM, flexibility, posture, and core stabilization for 15 minutes including:    Lat stretch behind mat 12 x 10"   Supine AA wand flexion to tolerance 3 x 10   Supine wand ER stretch @30deg 10" hold x 4'  Pt education: updated HEP    NP Today:   Flexion walkaway stretch 10 x 10"   Seated abd prop + MHP 7 min  Picnic ER to flexion; 30 x     MANUAL THERAPY TECHNIQUES including Joint mobilizations and Soft tissue Mobilization were applied to R shoulder for 08 minutes.    Assessment of shldr ROM  Oscillations for pain guarding (gr I-II)  Inferior GH mob (gr III-IV)  Posterior GH mobs (III)  PROM within protocol limits  Scap pinning into OH flexion     NEUROMUSCULAR RE-EDUCATION ACTIVITIES to improve Balance, Coordination, Kinesthetic, Sense, Proprioception, and Posture for 18 minutes.  The following were included:     90deg ER OTB 3 x 10  90deg IR GTB 3 x 10  S/L ER to neutral 1# 3 x 10 x 5"   Prone T (modified range) 3 x 8 x 3"     NP Today:   90deg flx ER walkouts YTB 2 x 10 x 3"   90deg flx IR walkouts YB 2 x 10 x 3"   Ext c scap retraction GTB 3 x 10   Supine Handcuffs; YTB; 4 x 10   Prone A (lvl 3) 2 x 10 x 10"   Walkouts ER/IR ; GTB; 1 x 15 ea    THERAPEUTIC ACTIVITIES to improve dynamic and functional performance for 14 minutes including:    UBE lvl 6.5 x 4'/4' for UE/cardiovascular endurance  Standing scaption in front of mirror 1# x 40hold    NP:  Uni wall slide 3 x 12  Landmines 3 x 12         Patient Education and Home Exercises     Home Exercises Provided and Patient Education Provided     Education provided:   - continue HEP    Written Home Exercises Provided: Patient instructed to cont prior " HEP. Exercises were reviewed and Skye was able to demonstrate them prior to the end of the session.  Skye demonstrated good understanding of the education provided. See EMR under Patient Instructions for exercises provided during therapy sessions    ASSESSMENT     Skye did well today. P/AROM continue to improve each week; ER @0deg and IR remain limited at this time. Updated HEP and encouraged continued compliance. No adverse effects reported p tx.     Skye is progressing well towards her goals.   Pt prognosis is Good.     Pt will continue to benefit from skilled outpatient physical therapy to address the deficits listed in the problem list box on initial evaluation, provide pt/family education and to maximize pt's level of independence in the home and community environment.     Pt's spiritual, cultural and educational needs considered and pt agreeable to plan of care and goals.     Anticipated barriers to physical therapy: none    Goals:   Short Term Goals: 6 weeks  1. Pt will be compliant with HEP 50% of prescribed amount. MET  2. The pt to demo improvement in R shoulder PROM to by 80% of the L (Progressing, not met)  3.  The pt to demo tolerance to being out of the sling for 24 hours with pain <2/10 MET     Long Term Goals: 24 weeks   Pt will be compliant with % of prescribed amount.  (Progressing, not met)  Pt will score 20% improvement on FOTO Shoulder Mobility  (Progressing, not met)  The pt to improvement in AROM of R shoulder within 80% of L shoulder to improve tolerance to OH movement  (Progressing, not met)  The pt to  Demo at least 4+/5 of RTC muscle testing to demo improvement in tolerance to activity (Progressing, not met)  The pt to tolerate lifting 10# overhead to improve tolerance to ADLs and work related activities  (Progressing, not met)  The pt will report full participation in ADLs and IADLs without restrictions related to R Shoulder.   (Progressing, not met)    PLAN     Progress  PROM per protocol    Viridiana Haney, PTA

## 2024-11-25 ENCOUNTER — CLINICAL SUPPORT (OUTPATIENT)
Dept: REHABILITATION | Facility: HOSPITAL | Age: 49
End: 2024-11-25
Payer: COMMERCIAL

## 2024-11-25 DIAGNOSIS — M25.611 DECREASED RANGE OF MOTION OF RIGHT SHOULDER: ICD-10-CM

## 2024-11-25 DIAGNOSIS — R29.3 POSTURE ABNORMALITY: Primary | ICD-10-CM

## 2024-11-25 DIAGNOSIS — R29.898 UPPER EXTREMITY WEAKNESS: ICD-10-CM

## 2024-11-25 PROCEDURE — 97112 NEUROMUSCULAR REEDUCATION: CPT

## 2024-11-25 PROCEDURE — 97140 MANUAL THERAPY 1/> REGIONS: CPT

## 2024-11-25 PROCEDURE — 97530 THERAPEUTIC ACTIVITIES: CPT

## 2024-11-25 PROCEDURE — 97110 THERAPEUTIC EXERCISES: CPT

## 2024-11-25 NOTE — PROGRESS NOTES
[facility]   Physical Therapy Treatment Note     Name: Skye Marte  [facility] Number: [id number]    Therapy Diagnosis:   Encounter Diagnoses   Name Primary?    Posture abnormality Yes    Decreased range of motion of right shoulder     Upper extremity weakness      Physician: Isidro Paredes MD    Visit Date: 11/25/2024  Physician Orders: PT Eval and Treat  Medical Diagnosis from Referral:   M19.011 (ICD-10-CM) - Arthritis of right acromioclavicular joint   M75.21 (ICD-10-CM) - Biceps tendinitis of right upper extremity   M75.101 (ICD-10-CM) - Tear of right rotator cuff, unspecified tear extent, unspecified whether traumatic   Evaluation Date: 9/3/2024  Authorization Period Expiration: 12/31/2024  Plan of Care Expiration: 11/26/2024  Progress Note Due: 10/01/2024  Visit # / Visits authorized: 21/40  FOTO: 2/3     Time In: 0532 pm  Time Out: 0630 pm  Total Billable Time: 58 minutes    FOTO IE 9/12: 30  FOTO 10/25: 45  FOTO 3:   FOTO goal by #[email or web address]: 63     Procedure by Reggie: 08/28/2024  1. Right shoulder arthroscopic rotator cuff repair.   2. Right shoulder open subpectoral biceps tenodesis  3. Right shoulder arthroscopic distal clavicle excision  4. Right shoulder arthroscopic subacromial decompression.   5. Right shoulder arthroscopic debridement labrum  Post-Op Precautions: Follow medium size rotator cuff repair      Precautions: Standard and post-op    SUBJECTIVE     Pt reports: shoulder feels a little tight today    She was compliant with home exercise program.  Response to previous treatment: no adverse effects  Functional change: improved sling management    Pain: 2/10 during AROM flexion  Location: right shoulder      OBJECTIVE     DOS: 8/28/24  POD: 12 weeks, 2 days as of 11/22    Objective Measures updated at progress report unless specified.     Shoulder Passive Range of Motion: 11/22    Right Left   Flexion    160 deg 180 deg   ER at 0    65 deg  80 deg   ER at 45   83  "deg  90 deg   ER at 90     deg   IR at 45    20 deg 70 deg     Active Range of Motion L shoulder:   Flexion: 120 (shrug after 100)   Functional ER: C2   Functional IR: PSIS      Treatment     Skye received the treatments listed below:      THERAPEUTIC EXERCISES to develop strength, endurance, ROM, flexibility, posture, and core stabilization for 10 minutes including:    Lat stretch behind mat 12 x 10"   Supine AA wand flexion to tolerance 3 x 10   Supine wand ER stretch @30deg 10" hold x 4'  Pt education: updated HEP    NP Today:   Flexion walkaway stretch 10 x 10"   Seated abd prop + MHP 7 min  Picnic ER to flexion; 30 x     MANUAL THERAPY TECHNIQUES including Joint mobilizations and Soft tissue Mobilization were applied to R shoulder for 10 minutes.    Assessment of shldr ROM  Oscillations for pain guarding (gr I-II)  Inferior GH mob (gr III-IV)  Posterior GH mobs (III)  PROM within protocol limits  Scap pinning into OH flexion     NEUROMUSCULAR RE-EDUCATION ACTIVITIES to improve Balance, Coordination, Kinesthetic, Sense, Proprioception, and Posture for 25 minutes.  The following were included:   Walkouts ER/IR ; GTB; 1 x 15 ea  ER 0-90 deg OTB; 3 x 10  S/L ER to neutral 1# 3 x 10 x 5"   Standing handcuff; YTB: 3 x 10   Prone A (lvl 3) 2 x 10 x 10"       NP Today:   90deg flx ER walkouts YTB 2 x 10 x 3"   90deg flx IR walkouts YB 2 x 10 x 3"   90deg ER OTB 3 x 10  90deg IR GTB 3 x 10  Prone T (modified range) 3 x 8 x 3"     THERAPEUTIC ACTIVITIES to improve dynamic and functional performance for 15 minutes including:  UBE lvl 6.5 x 4'/4' for UE/cardiovascular endurance  Standing scaption in front of mirror 1# x 40hold      NP:  Uni wall slide 3 x 12  Landmines 3 x 12         Patient Education and Home Exercises     Home Exercises Provided and Patient Education Provided     Education provided:   - continue HEP    Written Home Exercises Provided: Patient instructed to cont prior HEP. Exercises were reviewed " and Skye was able to demonstrate them prior to the end of the session.  Skye demonstrated good understanding of the education provided. See EMR under Patient Instructions for exercises provided during therapy sessions    ASSESSMENT     Skye is continuing to progress well in shoulder range of motion and is continually educated on current progress. She was very fatigued by periscapular training in prone and will benefit continued progression as able.   Skye is progressing well towards her goals.   Pt prognosis is Good.     Pt will continue to benefit from skilled outpatient physical therapy to address the deficits listed in the problem list box on initial evaluation, provide pt/family education and to maximize pt's level of independence in the home and community environment.     Pt's spiritual, cultural and educational needs considered and pt agreeable to plan of care and goals.     Anticipated barriers to physical therapy: none    Goals:   Short Term Goals: 6 weeks  1. Pt will be compliant with HEP 50% of prescribed amount. MET  2. The pt to demo improvement in R shoulder PROM to by 80% of the L (Progressing, not met)  3.  The pt to demo tolerance to being out of the sling for 24 hours with pain <2/10 MET     Long Term Goals: 24 weeks   Pt will be compliant with % of prescribed amount.  (Progressing, not met)  Pt will score 20% improvement on FOTO Shoulder Mobility  (Progressing, not met)  The pt to improvement in AROM of R shoulder within 80% of L shoulder to improve tolerance to OH movement  (Progressing, not met)  The pt to  Demo at least 4+/5 of RTC muscle testing to demo improvement in tolerance to activity (Progressing, not met)  The pt to tolerate lifting 10# overhead to improve tolerance to ADLs and work related activities  (Progressing, not met)  The pt will report full participation in ADLs and IADLs without restrictions related to R Shoulder.   (Progressing, not met)    PLAN     Progress  PROM per protocol    Lelo Everett, PT

## 2024-11-29 ENCOUNTER — CLINICAL SUPPORT (OUTPATIENT)
Dept: REHABILITATION | Facility: HOSPITAL | Age: 49
End: 2024-11-29
Payer: COMMERCIAL

## 2024-11-29 DIAGNOSIS — M25.611 DECREASED RANGE OF MOTION OF RIGHT SHOULDER: ICD-10-CM

## 2024-11-29 DIAGNOSIS — R29.3 POSTURE ABNORMALITY: Primary | ICD-10-CM

## 2024-11-29 DIAGNOSIS — R29.898 UPPER EXTREMITY WEAKNESS: ICD-10-CM

## 2024-11-29 PROCEDURE — 97530 THERAPEUTIC ACTIVITIES: CPT

## 2024-11-29 PROCEDURE — 97112 NEUROMUSCULAR REEDUCATION: CPT

## 2024-11-29 PROCEDURE — 97140 MANUAL THERAPY 1/> REGIONS: CPT

## 2024-11-29 PROCEDURE — 97110 THERAPEUTIC EXERCISES: CPT

## 2024-11-29 NOTE — PLAN OF CARE
Outpatient Therapy Updated Plan of Care     Visit Date: 11/29/2024  Name: Skye Marte  [facility] Number: [id number]    Therapy Diagnosis:   Encounter Diagnoses   Name Primary?    Posture abnormality Yes    Decreased range of motion of right shoulder     Upper extremity weakness      Physician: Isidro Paredes MD    Physician Orders: PT Eval and Treat  Medical Diagnosis from Referral:   M19.011 (ICD-10-CM) - Arthritis of right acromioclavicular joint   M75.21 (ICD-10-CM) - Biceps tendinitis of right upper extremity   M75.101 (ICD-10-CM) - Tear of right rotator cuff, unspecified tear extent, unspecified whether traumatic   Evaluation Date: 9/3/2024  Authorization Period Expiration: 12/31/2024  Plan of Care Expiration: 02/21/2025   Progress Note Due: 12/31/2024  Visit # / Visits authorized: 22/40  FOTO: 2/3     Time In: 0900 am  Time Out: 1000 am  Total Billable Time: 60 minutes    FOTO IE 9/12: 30  FOTO 10/25: 45  FOTO 3:   FOTO goal by #[email or web address]: 63     Procedure by Reggie: 08/28/2024  1. Right shoulder arthroscopic rotator cuff repair.   2. Right shoulder open subpectoral biceps tenodesis  3. Right shoulder arthroscopic distal clavicle excision  4. Right shoulder arthroscopic subacromial decompression.   5. Right shoulder arthroscopic debridement labrum  Post-Op Precautions: Follow medium size rotator cuff repair      Precautions: Standard and post-op    Subjective     Update: see daily treatment note    Objective     Update: DOS: 8/28/24  POD: 13 weeks, 2 days as of 11/29    Objective Measures updated at progress report unless specified.     Shoulder Passive Range of Motion: 11/22    Right Left   Flexion    160 deg 170 deg   ER at 0    80 deg  85 deg   ER at 45   98 deg  108 deg   IR at 45    30 deg 48 deg        Active Range of Motion:   Shoulder Right Left   Flexion 130 170   Abduction 90 145   Functional ER Back of head C7   Functional IR Lateral hip T8     Upper Extremity Strength   (R) UE (L) UE    Shoulder flexion: 4-/5 4+/5   Shoulder Abduction: 4-/5 4+/5   Shoulder ER NT 4/5   Shoulder IR NT 5/5   Lower Trap 3-/5 4/5   Middle Trap 3-/5 4/5   Serratus anterior 4-/5 4+/5   Rhomboids 4/5 5/5        Assessment     Update: Skye is 13 weeks s/p small RCR and currently presents with limitations in active and passive range of motion as well as as overall strength. She is currently limited in functional reaching, ADLs, and work related duties as is expected at this stage post-operatively. She will benefit continued skilled OP PT to address the above deficits and improve her functional independence.      Goals:   Short Term Goals: 6 weeks  1. Pt will be compliant with HEP 50% of prescribed amount. MET  2. The pt to demo improvement in R shoulder PROM to by 80% of the L (MET  3.  The pt to demo tolerance to being out of the sling for 24 hours with pain <2/10 MET     Long Term Goals: 24 weeks   Pt will be compliant with % of prescribed amount.  (Progressing, not met)  Pt will score 20% improvement on FOTO Shoulder Mobility  (Progressing, not met)  The pt to improvement in AROM of R shoulder within 80% of L shoulder to improve tolerance to OH movement  (Progressing, not met)  The pt to  Demo at least 4+/5 of RTC muscle testing to demo improvement in tolerance to activity (Progressing, not met)  The pt to tolerate lifting 10# overhead to improve tolerance to ADLs and work related activities  (Progressing, not met)  The pt will report full participation in ADLs and IADLs without restrictions related to R Shoulder.   (Progressing, not met)  Reasons for Recertification of Therapy: updated plan of care for reauthorization    Plan     Updated Certification Period: 11/29/2024 to 02/21/2025  Recommended Treatment Plan: 2 times per week for 12 weeks: Manual Therapy, Moist Heat/ Ice, Neuromuscular Re-ed, Patient Education, Therapeutic Activities, and Therapeutic Exercise  Other Recommendations: None    Lelo Everett,  PT, DPT, SCS  11/29/2024      I CERTIFY THE NEED FOR THESE SERVICES FURNISHED UNDER THIS PLAN OF TREATMENT AND WHILE UNDER MY CARE    Physician's comments:        Physician's Signature: ___________________________________________________

## 2024-11-29 NOTE — PROGRESS NOTES
[facility]   Physical Therapy Treatment Note     Name: Skye Marte  Clinic Number: [id number]    Therapy Diagnosis:   No diagnosis found.    Physician: Isidro Paredes MD    Visit Date: 11/29/2024  Physician Orders: PT Eval and Treat  Medical Diagnosis from Referral:   M19.011 (ICD-10-CM) - Arthritis of right acromioclavicular joint   M75.21 (ICD-10-CM) - Biceps tendinitis of right upper extremity   M75.101 (ICD-10-CM) - Tear of right rotator cuff, unspecified tear extent, unspecified whether traumatic   Evaluation Date: 9/3/2024  Authorization Period Expiration: 12/31/2024  Plan of Care Expiration: 02/21/2025   Progress Note Due: 12/31/2024  Visit # / Visits authorized: 22/40  FOTO: 2/3     Time In: 0900 am  Time Out: 1000 am  Total Billable Time: 60 minutes    FOTO IE 9/12: 30  FOTO 10/25: 45  FOTO 3:   FOTO goal by #[email or web address]: 63     Procedure by Reggie: 08/28/2024  1. Right shoulder arthroscopic rotator cuff repair.   2. Right shoulder open subpectoral biceps tenodesis  3. Right shoulder arthroscopic distal clavicle excision  4. Right shoulder arthroscopic subacromial decompression.   5. Right shoulder arthroscopic debridement labrum  Post-Op Precautions: Follow medium size rotator cuff repair      Precautions: Standard and post-op    SUBJECTIVE     Pt reports: woke up feeling a little stiff today but thinks it might be the weather change    She was compliant with home exercise program.  Response to previous treatment: no adverse effects  Functional change: improved sling management    Pain: 2/10 during AROM flexion  Location: right shoulder      OBJECTIVE     DOS: 8/28/24  POD: 13 weeks, 2 days as of 11/29    Objective Measures updated at progress report unless specified.     Shoulder Passive Range of Motion: 11/22    Right Left   Flexion    160 deg 170 deg   ER at 0    80 deg  85 deg   ER at 45   98 deg  108 deg   IR at 45    30 deg 48 deg        Active Range of Motion:  "  Shoulder Right Left   Flexion 130 170   Abduction 90 145   Functional ER Back of head C7   Functional IR Lateral hip T8     Upper Extremity Strength   (R) UE (L) UE   Shoulder flexion: 4-/5 4+/5   Shoulder Abduction: 4-/5 4+/5   Shoulder ER NT 4/5   Shoulder IR NT 5/5   Lower Trap 3-/5 4/5   Middle Trap 3-/5 4/5   Serratus anterior 4-/5 4+/5   Rhomboids 4/5 5/5        Treatment     Skye received the treatments listed below:      THERAPEUTIC EXERCISES to develop strength, endurance, ROM, flexibility, posture, and core stabilization for 10 minutes including:    Lat stretch behind mat 12 x 10"   Supine AA wand flexion to tolerance 3 x 10   Supine wand ER stretch @30deg 10" hold x 4'  Pt education: updated HEP    NP Today:   Flexion walkaway stretch 10 x 10"   Seated abd prop + MHP 7 min  Picnic ER to flexion; 30 x     MANUAL THERAPY TECHNIQUES including Joint mobilizations and Soft tissue Mobilization were applied to R shoulder for 20 minutes.    Assessment of shldr ROM  Oscillations for pain guarding (gr I-II)  Inferior GH mob (gr III-IV)  Posterior GH mobs (III)  PROM within protocol limits  Scap pinning into OH flexion     NEUROMUSCULAR RE-EDUCATION ACTIVITIES to improve Balance, Coordination, Kinesthetic, Sense, Proprioception, and Posture for 15 minutes.  The following were included:   Walkouts ER/IR ; GTB; 1 x 15 ea  ER 0-90 deg OTB; 3 x 10  90deg ER OTB 3 x 10  90deg IR GTB 3 x 10  Standing A; OTB 3 x 10      NP Today:   90deg flx ER walkouts YTB 2 x 10 x 3"   90deg flx IR walkouts YB 2 x 10 x 3"   Prone A (lvl 3) 2 x 10 x 10"   Prone T (modified range) 3 x 8 x 3"   S/L ER to neutral 1# 3 x 10 x 5"   Standing handcuff; YTB: 3 x 10     THERAPEUTIC ACTIVITIES to improve dynamic and functional performance for 15 minutes including:  UBE lvl 6.5 x 4'/4' for UE/cardiovascular endurance  Standing scaption in front of mirror 1# x 40hold  Uni wall slide 1# 3 x 12    NP Today:  Landmines 3 x 12         Patient " Education and Home Exercises     Home Exercises Provided and Patient Education Provided     Education provided:   - continue HEP    Written Home Exercises Provided: Patient instructed to cont prior HEP. Exercises were reviewed and Skye was able to demonstrate them prior to the end of the session.  Skye demonstrated good understanding of the education provided. See EMR under Patient Instructions for exercises provided during therapy sessions    ASSESSMENT     See updated plan of care in treatment section     Skye is progressing well towards her goals.   Pt prognosis is Good.     Pt will continue to benefit from skilled outpatient physical therapy to address the deficits listed in the problem list box on initial evaluation, provide pt/family education and to maximize pt's level of independence in the home and community environment.     Pt's spiritual, cultural and educational needs considered and pt agreeable to plan of care and goals.     Anticipated barriers to physical therapy: none    Goals:   Short Term Goals: 6 weeks  1. Pt will be compliant with HEP 50% of prescribed amount. MET  2. The pt to demo improvement in R shoulder PROM to by 80% of the L (MET  3.  The pt to demo tolerance to being out of the sling for 24 hours with pain <2/10 MET     Long Term Goals: 24 weeks   Pt will be compliant with % of prescribed amount.  (Progressing, not met)  Pt will score 20% improvement on FOTO Shoulder Mobility  (Progressing, not met)  The pt to improvement in AROM of R shoulder within 80% of L shoulder to improve tolerance to OH movement  (Progressing, not met)  The pt to  Demo at least 4+/5 of RTC muscle testing to demo improvement in tolerance to activity (Progressing, not met)  The pt to tolerate lifting 10# overhead to improve tolerance to ADLs and work related activities  (Progressing, not met)  The pt will report full participation in ADLs and IADLs without restrictions related to R Shoulder.    (Progressing, not met)    PLAN     Progress PROM per protocol    Lelo Everett, PT, DPT, SCS

## 2024-12-02 ENCOUNTER — CLINICAL SUPPORT (OUTPATIENT)
Dept: REHABILITATION | Facility: HOSPITAL | Age: 49
End: 2024-12-02
Payer: COMMERCIAL

## 2024-12-02 DIAGNOSIS — R29.3 POSTURE ABNORMALITY: Primary | ICD-10-CM

## 2024-12-02 DIAGNOSIS — R29.898 UPPER EXTREMITY WEAKNESS: ICD-10-CM

## 2024-12-02 DIAGNOSIS — M25.611 DECREASED RANGE OF MOTION OF RIGHT SHOULDER: ICD-10-CM

## 2024-12-02 PROCEDURE — 97140 MANUAL THERAPY 1/> REGIONS: CPT

## 2024-12-02 PROCEDURE — 97110 THERAPEUTIC EXERCISES: CPT

## 2024-12-02 PROCEDURE — 97112 NEUROMUSCULAR REEDUCATION: CPT

## 2024-12-02 PROCEDURE — 97530 THERAPEUTIC ACTIVITIES: CPT

## 2024-12-02 NOTE — PROGRESS NOTES
[facility]   Physical Therapy Treatment Note     Name: Skye Marte  [facility] Number: [id number]    Therapy Diagnosis:   Encounter Diagnoses   Name Primary?    Posture abnormality Yes    Decreased range of motion of right shoulder     Upper extremity weakness        Physician: Isidro Paredes MD    Visit Date: 12/2/2024  Physician Orders: PT Eval and Treat  Medical Diagnosis from Referral:   M19.011 (ICD-10-CM) - Arthritis of right acromioclavicular joint   M75.21 (ICD-10-CM) - Biceps tendinitis of right upper extremity   M75.101 (ICD-10-CM) - Tear of right rotator cuff, unspecified tear extent, unspecified whether traumatic   Evaluation Date: 9/3/2024  Authorization Period Expiration: 12/31/2024  Plan of Care Expiration: 02/21/2025   Progress Note Due: 12/31/2024  Visit # / Visits authorized: 23/40  FOTO: 2/3     Time In: 0530 pm  Time Out: 0630 pm  Total Billable Time: 60 minutes    FOTO IE 9/12: 30  FOTO 10/25: 45  FOTO 3:   FOTO goal by #[email or web address]: 63     Procedure by Reggie: 08/28/2024  1. Right shoulder arthroscopic rotator cuff repair.   2. Right shoulder open subpectoral biceps tenodesis  3. Right shoulder arthroscopic distal clavicle excision  4. Right shoulder arthroscopic subacromial decompression.   5. Right shoulder arthroscopic debridement labrum  Post-Op Precautions: Follow medium size rotator cuff repair      Precautions: Standard and post-op    SUBJECTIVE     Pt reports: feels like her function has improved a lot, only painful when she's trying to sleep    She was compliant with home exercise program.  Response to previous treatment: no adverse effects  Functional change: improved sling management    Pain: 2/10 during AROM flexion  Location: right shoulder      OBJECTIVE     DOS: 8/28/24  POD: 13 weeks, 5 days as of 12/2    Objective Measures updated at progress report unless specified.     Shoulder Passive Range of Motion: 11/22    Right Left   Flexion    160 deg  "170 deg   ER at 0    80 deg  85 deg   ER at 45   98 deg  108 deg   IR at 45    30 deg 48 deg        Active Range of Motion:   Shoulder Right Left   Flexion 130 170   Abduction 90 145   Functional ER Back of head C7   Functional IR Lateral hip T8     Upper Extremity Strength   (R) UE (L) UE   Shoulder flexion: 4-/5 4+/5   Shoulder Abduction: 4-/5 4+/5   Shoulder ER NT 4/5   Shoulder IR NT 5/5   Lower Trap 3-/5 4/5   Middle Trap 3-/5 4/5   Serratus anterior 4-/5 4+/5   Rhomboids 4/5 5/5        Treatment     Skye received the treatments listed below:      THERAPEUTIC EXERCISES to develop strength, endurance, ROM, flexibility, posture, and core stabilization for 13 minutes including:  Lat robot; 20x   Lat stretch behind mat 12 x 10"   Picnic ER to flexion; 30 x   Pt education: updated HEP    NP Today:   Flexion walkaway stretch 10 x 10"   Seated abd prop + MHP 7 min  Supine AA wand flexion to tolerance 3 x 10   Supine wand ER stretch @30deg 10" hold x 4'    MANUAL THERAPY TECHNIQUES including Joint mobilizations and Soft tissue Mobilization were applied to R shoulder for 12 minutes.  Assessment of shldr ROM  Oscillations for pain guarding (gr I-II)  Inferior GH mob (gr III-IV)  Posterior GH mobs (III)  PROM within protocol limits  Scap pinning into OH flexion     NEUROMUSCULAR RE-EDUCATION ACTIVITIES to improve Balance, Coordination, Kinesthetic, Sense, Proprioception, and Posture for 20 minutes.  The following were included:   S/L ER to neutral 2# 3 x 6 x 3"   ER 0-90 deg GTB; 3 x 10  Seated ER 90 deg flex; 0#; 3 x 10   Prone A (lvl 3) 3 x 10 x 5"   Standing T; YTB 3 x 10    NP Today:   90deg ER OTB 3 x 10  90deg IR GTB 3 x 10  Standing handcuff; YTB: 3 x 10     THERAPEUTIC ACTIVITIES to improve dynamic and functional performance for 15 minutes including:  UBE lvl 6.5 x 4'/4' for UE/cardiovascular endurance  Uni wall slide 2# 3 x 10    NP Today:  Landmines 3 x 12   Standing scaption in front of mirror 1# x " 40hold      Patient Education and Home Exercises     Home Exercises Provided and Patient Education Provided     Education provided:   - continue HEP    Written Home Exercises Provided: Patient instructed to cont prior HEP. Exercises were reviewed and Skye was able to demonstrate them prior to the end of the session.  Skye demonstrated good understanding of the education provided. See EMR under Patient Instructions for exercises provided during therapy sessions    ASSESSMENT     Skye is progressing well in active range of motion as well as initial loading progression today. She was very fatigued by standing T as well as wall slide. Will monitor response to loading changes and progress/regress accordingly.     Skye is progressing well towards her goals.   Pt prognosis is Good.     Pt will continue to benefit from skilled outpatient physical therapy to address the deficits listed in the problem list box on initial evaluation, provide pt/family education and to maximize pt's level of independence in the home and community environment.     Pt's spiritual, cultural and educational needs considered and pt agreeable to plan of care and goals.     Anticipated barriers to physical therapy: none    Goals:   Short Term Goals: 6 weeks  1. Pt will be compliant with HEP 50% of prescribed amount. MET  2. The pt to demo improvement in R shoulder PROM to by 80% of the L (MET  3.  The pt to demo tolerance to being out of the sling for 24 hours with pain <2/10 MET     Long Term Goals: 24 weeks   Pt will be compliant with % of prescribed amount.  (Progressing, not met)  Pt will score 20% improvement on FOTO Shoulder Mobility  (Progressing, not met)  The pt to improvement in AROM of R shoulder within 80% of L shoulder to improve tolerance to OH movement  (Progressing, not met)  The pt to  Demo at least 4+/5 of RTC muscle testing to demo improvement in tolerance to activity (Progressing, not met)  The pt to tolerate lifting  10# overhead to improve tolerance to ADLs and work related activities  (Progressing, not met)  The pt will report full participation in ADLs and IADLs without restrictions related to R Shoulder.   (Progressing, not met)    PLAN     Progress PROM per protocol    Lelo Everett, PT, DPT, SCS

## 2024-12-06 ENCOUNTER — CLINICAL SUPPORT (OUTPATIENT)
Dept: REHABILITATION | Facility: HOSPITAL | Age: 49
End: 2024-12-06
Payer: COMMERCIAL

## 2024-12-06 DIAGNOSIS — M25.611 DECREASED RANGE OF MOTION OF RIGHT SHOULDER: ICD-10-CM

## 2024-12-06 DIAGNOSIS — R29.3 POSTURE ABNORMALITY: Primary | ICD-10-CM

## 2024-12-06 DIAGNOSIS — R29.898 UPPER EXTREMITY WEAKNESS: ICD-10-CM

## 2024-12-06 PROCEDURE — 97110 THERAPEUTIC EXERCISES: CPT | Mod: CQ

## 2024-12-06 PROCEDURE — 97112 NEUROMUSCULAR REEDUCATION: CPT | Mod: CQ

## 2024-12-06 PROCEDURE — 97140 MANUAL THERAPY 1/> REGIONS: CPT | Mod: CQ

## 2024-12-06 PROCEDURE — 97530 THERAPEUTIC ACTIVITIES: CPT | Mod: CQ

## 2024-12-06 NOTE — PROGRESS NOTES
[facility]   Physical Therapy Treatment Note     Name: Skye Marte  [facility] Number: [id number]    Therapy Diagnosis:   Encounter Diagnoses   Name Primary?    Posture abnormality Yes    Decreased range of motion of right shoulder     Upper extremity weakness        Physician: Isidro Paredes MD    Visit Date: 12/6/2024  Physician Orders: PT Eval and Treat  Medical Diagnosis from Referral:   M19.011 (ICD-10-CM) - Arthritis of right acromioclavicular joint   M75.21 (ICD-10-CM) - Biceps tendinitis of right upper extremity   M75.101 (ICD-10-CM) - Tear of right rotator cuff, unspecified tear extent, unspecified whether traumatic   Evaluation Date: 9/3/2024  Authorization Period Expiration: 12/31/2024  Plan of Care Expiration: 02/21/2025   Progress Note Due: 12/31/2024  Visit # / Visits authorized: 24/40  FOTO: 2/3     Time In: 0802  Time Out: 0906  Total Billable Time: 62 minutes    FOTO next tx if appropriate    FOTO IE 9/12: 30  FOTO 10/25: 45  FOTO 3:   FOTO goal by #19: 63     Procedure by Reggie: 08/28/2024  1. Right shoulder arthroscopic rotator cuff repair.   2. Right shoulder open subpectoral biceps tenodesis  3. Right shoulder arthroscopic distal clavicle excision  4. Right shoulder arthroscopic subacromial decompression.   5. Right shoulder arthroscopic debridement labrum  Post-Op Precautions: Follow medium size rotator cuff repair      Precautions: Standard and post-op    SUBJECTIVE     Pt reports: no new complaints. Work is going well.     She was compliant with home exercise program.  Response to previous treatment: no adverse effects  Functional change: improved sling management    Pain: 2/10 during AROM flexion  Location: right shoulder      OBJECTIVE     DOS: 8/28/24  POD: 14 weeks, 2 days as of 12/6    Objective Measures updated at progress report unless specified.     Shoulder Passive Range of Motion: 11/29    Right Left   Flexion    160 deg 170 deg   ER at 0     "80 deg  85 deg   ER at 45   98 deg  108 deg   IR at 45    30 deg 48 deg     Active Range of Motion: 11/29  Shoulder Right Left   Flexion 130 170   Abduction 90 145   Functional ER Back of head C7   Functional IR Lateral hip T8     Upper Extremity Strength: 11/29   (R) UE (L) UE   Shoulder flexion: 4-/5 4+/5   Shoulder Abduction: 4-/5 4+/5   Shoulder ER NT 4/5   Shoulder IR NT 5/5   Lower Trap 3-/5 4/5   Middle Trap 3-/5 4/5   Serratus anterior 4-/5 4+/5   Rhomboids 4/5 5/5        Treatment     Skye received the treatments listed below:      THERAPEUTIC EXERCISES to develop strength, endurance, ROM, flexibility, posture, and core stabilization for 14 minutes including:    Supine AA wand flexion to tolerance 3 x 10   Supine wand ER stretch @30deg 10" hold x 3'  S/L post cap stretch 12 x 10"   Lat stretch behind mat 12 x 10"     NP Today:   Lat robot; 20x   Flexion walkaway stretch 10 x 10"   Seated abd prop + MHP 7 min  Picnic ER to flexion; 30 x     MANUAL THERAPY TECHNIQUES including Joint mobilizations and Soft tissue Mobilization were applied to R shoulder for 08 minutes.    Assessment of shldr ROM  Oscillations for pain guarding (gr I-II)  Inferior GH mob (gr III-IV)  Posterior GH mobs (III)  PROM within protocol limits  Scap pinning into OH flexion     NEUROMUSCULAR RE-EDUCATION ACTIVITIES to improve Balance, Coordination, Kinesthetic, Sense, Proprioception, and Posture for 25 minutes.  The following were included:     S/L ER to neutral 2# 3 x 8 x 5"   Prone T 3 x 8  90deg ER OTB 3 x 10  90deg IR GTB 3 x 12  Standing T YTB 3 x 10    NP:  ER 0-90 deg GTB; 3 x 10  Seated ER 90 deg flex; 0#; 3 x 10   Standing handcuff; YTB: 3 x 10     THERAPEUTIC ACTIVITIES to improve dynamic and functional performance for 15 minutes including:    UBE lvl 6.5 x 4'/4' for UE/cardiovascular endurance  Uni wall slide 2# 3 x 10    NP Today:  Landmines 3 x 12   Standing scaption in front of mirror 1# x 40hold      Patient Education " and Home Exercises     Home Exercises Provided and Patient Education Provided     Education provided:   - continue HEP    Written Home Exercises Provided: Patient instructed to cont prior HEP. Exercises were reviewed and Skye was able to demonstrate them prior to the end of the session.  Skye demonstrated good understanding of the education provided. See EMR under Patient Instructions for exercises provided during therapy sessions    ASSESSMENT     Skye did well today. Initiated prone T's with min cueing for proper scap position. Continued fatigue during resisted T's and weighted wall slides. Encouraged continued compliance with HEP. No adverse effects reported p tx.     Skye is progressing well towards her goals.   Pt prognosis is Good.     Pt will continue to benefit from skilled outpatient physical therapy to address the deficits listed in the problem list box on initial evaluation, provide pt/family education and to maximize pt's level of independence in the home and community environment.     Pt's spiritual, cultural and educational needs considered and pt agreeable to plan of care and goals.     Anticipated barriers to physical therapy: none    Goals:   Short Term Goals: 6 weeks  1. Pt will be compliant with HEP 50% of prescribed amount. MET  2. The pt to demo improvement in R shoulder PROM to by 80% of the L (MET  3.  The pt to demo tolerance to being out of the sling for 24 hours with pain <2/10 MET     Long Term Goals: 24 weeks   Pt will be compliant with % of prescribed amount.  (Progressing, not met)  Pt will score 20% improvement on FOTO Shoulder Mobility  (Progressing, not met)  The pt to improvement in AROM of R shoulder within 80% of L shoulder to improve tolerance to OH movement  (Progressing, not met)  The pt to  Demo at least 4+/5 of RTC muscle testing to demo improvement in tolerance to activity (Progressing, not met)  The pt to tolerate lifting 10# overhead to improve tolerance to  ADLs and work related activities  (Progressing, not met)  The pt will report full participation in ADLs and IADLs without restrictions related to R Shoulder.   (Progressing, not met)    PLAN     Progress PROM per protocol    Viridiana Hnaey, PTA

## 2024-12-13 ENCOUNTER — CLINICAL SUPPORT (OUTPATIENT)
Dept: REHABILITATION | Facility: HOSPITAL | Age: 49
End: 2024-12-13
Payer: COMMERCIAL

## 2024-12-13 DIAGNOSIS — R29.3 POSTURE ABNORMALITY: Primary | ICD-10-CM

## 2024-12-13 DIAGNOSIS — R29.898 UPPER EXTREMITY WEAKNESS: ICD-10-CM

## 2024-12-13 DIAGNOSIS — M25.611 DECREASED RANGE OF MOTION OF RIGHT SHOULDER: ICD-10-CM

## 2024-12-13 PROCEDURE — 97140 MANUAL THERAPY 1/> REGIONS: CPT | Mod: CQ

## 2024-12-13 PROCEDURE — 97530 THERAPEUTIC ACTIVITIES: CPT | Mod: CQ

## 2024-12-13 PROCEDURE — 97112 NEUROMUSCULAR REEDUCATION: CPT | Mod: CQ

## 2024-12-13 PROCEDURE — 97110 THERAPEUTIC EXERCISES: CPT | Mod: CQ

## 2024-12-13 NOTE — PROGRESS NOTES
[facility]   Physical Therapy Treatment Note     Name: Skye Marte  [facility] Number: [id number]    Therapy Diagnosis:   Encounter Diagnoses   Name Primary?    Posture abnormality Yes    Decreased range of motion of right shoulder     Upper extremity weakness        Physician: Isidro Paredes MD    Visit Date: 12/13/2024  Physician Orders: PT Eval and Treat  Medical Diagnosis from Referral:   M19.011 (ICD-10-CM) - Arthritis of right acromioclavicular joint   M75.21 (ICD-10-CM) - Biceps tendinitis of right upper extremity   M75.101 (ICD-10-CM) - Tear of right rotator cuff, unspecified tear extent, unspecified whether traumatic   Evaluation Date: 9/3/2024  Authorization Period Expiration: 12/31/2024  Plan of Care Expiration: 02/21/2025   Progress Note Due: 12/31/2024  Visit # / Visits authorized: 25/40  FOTO: 2/3     Time In: 1102  Time Out: 1206  Total Billable Time: 62 minutes    FOTO next tx if appropriate    FOTO IE 9/12: 30  FOTO 10/25: 45  FOTO 3:   FOTO goal by #19: 63     Procedure by Reggie: 08/28/2024  1. Right shoulder arthroscopic rotator cuff repair.   2. Right shoulder open subpectoral biceps tenodesis  3. Right shoulder arthroscopic distal clavicle excision  4. Right shoulder arthroscopic subacromial decompression.   5. Right shoulder arthroscopic debridement labrum  Post-Op Precautions: Follow medium size rotator cuff repair      Precautions: Standard and post-op    SUBJECTIVE     Pt reports: no new complaints. Work is going well.     She was compliant with home exercise program.  Response to previous treatment: no adverse effects  Functional change: improved sling management    Pain: 2/10 during AROM flexion  Location: right shoulder      OBJECTIVE     DOS: 8/28/24  POD: 15 weeks, 2 days as of 12/13    Objective Measures updated at progress report unless specified.     Shoulder Passive Range of Motion: 12/13    Right Left   Flexion    160 deg 180 deg   ER at 0    " 65 deg  85 deg   ER at 45    84 deg  100+ deg   IR at 45    30 deg 48 deg     Active Range of Motion: 11/29  Shoulder Right Left   Flexion 130 170   Abduction 90 145   Functional ER Back of head C7   Functional IR Lateral hip T8     Upper Extremity Strength: 11/29   (R) UE (L) UE   Shoulder flexion: 4-/5 4+/5   Shoulder Abduction: 4-/5 4+/5   Shoulder ER NT 4/5   Shoulder IR NT 5/5   Lower Trap 3-/5 4/5   Middle Trap 3-/5 4/5   Serratus anterior 4-/5 4+/5   Rhomboids 4/5 5/5        Treatment     Skye received the treatments listed below:      THERAPEUTIC EXERCISES to develop strength, endurance, ROM, flexibility, posture, and core stabilization for 14 minutes including:    Supine AA wand flexion to tolerance 3 x 10   Supine wand ER stretch @30deg 10" hold x 3'  S/L post cap stretch 12 x 10"   Lat stretch behind mat 10 x 10"     NP Today:   Lat robot; 20x   Flexion walkaway stretch 10 x 10"   Seated abd prop + MHP 7 min  Picnic ER to flexion; 30 x     MANUAL THERAPY TECHNIQUES including Joint mobilizations and Soft tissue Mobilization were applied to R shoulder for 08 minutes.    Assessment of shldr ROM  Oscillations for pain guarding (gr I-II)  Inferior GH mob (gr III-IV)  Posterior GH mobs (III)  PROM within protocol limits  Scap pinning into OH flexion     NEUROMUSCULAR RE-EDUCATION ACTIVITIES to improve Balance, Coordination, Kinesthetic, Sense, Proprioception, and Posture for 26 minutes.  The following were included:     S/L ER to neutral 2# 3 x 12 x 5"   Prone T 3 x 8  Prone Y 3 x 8  90deg ER OTB 3 x 10  90deg IR GTB 3 x 12    NP:  ER 0-90 deg GTB; 3 x 10  Seated ER 90 deg flex; 0#; 3 x 10   Standing handcuff; YTB: 3 x 10   Standing T YTB 3 x 10    THERAPEUTIC ACTIVITIES to improve dynamic and functional performance for 14 minutes including:    UBE lvl 6.5 x 4'/4' for UE/cardiovascular endurance  Uni wall slide 2# 3 x 12    NP Today:  Landmines 3 x 12   Standing scaption in front of mirror 1# x " 40hold      Patient Education and Home Exercises     Home Exercises Provided and Patient Education Provided     Education provided:   - continue HEP    Written Home Exercises Provided: Patient instructed to cont prior HEP. Exercises were reviewed and Skye was able to demonstrate them prior to the end of the session.  Skye demonstrated good understanding of the education provided. See EMR under Patient Instructions for exercises provided during therapy sessions    ASSESSMENT     Skye did well today. Min cueing required during prone T/Y's for proper scap position. Overall she is progressing very well at this time. Encouraged continued compliance with HEP over the weekend. No adverse effects reported p tx.     Skye is progressing well towards her goals.   Pt prognosis is Good.     Pt will continue to benefit from skilled outpatient physical therapy to address the deficits listed in the problem list box on initial evaluation, provide pt/family education and to maximize pt's level of independence in the home and community environment.     Pt's spiritual, cultural and educational needs considered and pt agreeable to plan of care and goals.     Anticipated barriers to physical therapy: none    Goals:   Short Term Goals: 6 weeks  1. Pt will be compliant with HEP 50% of prescribed amount. MET  2. The pt to demo improvement in R shoulder PROM to by 80% of the L (MET  3.  The pt to demo tolerance to being out of the sling for 24 hours with pain <2/10 MET     Long Term Goals: 24 weeks   Pt will be compliant with % of prescribed amount.  (Progressing, not met)  Pt will score 20% improvement on FOTO Shoulder Mobility  (Progressing, not met)  The pt to improvement in AROM of R shoulder within 80% of L shoulder to improve tolerance to OH movement  (Progressing, not met)  The pt to  Demo at least 4+/5 of RTC muscle testing to demo improvement in tolerance to activity (Progressing, not met)  The pt to tolerate lifting  10# overhead to improve tolerance to ADLs and work related activities  (Progressing, not met)  The pt will report full participation in ADLs and IADLs without restrictions related to R Shoulder.   (Progressing, not met)    PLAN     Progress PROM per protocol    Viridiana Haney, PTA

## 2024-12-20 ENCOUNTER — CLINICAL SUPPORT (OUTPATIENT)
Dept: REHABILITATION | Facility: HOSPITAL | Age: 49
End: 2024-12-20
Payer: COMMERCIAL

## 2024-12-20 DIAGNOSIS — R29.898 UPPER EXTREMITY WEAKNESS: ICD-10-CM

## 2024-12-20 DIAGNOSIS — R29.3 POSTURE ABNORMALITY: Primary | ICD-10-CM

## 2024-12-20 DIAGNOSIS — M25.611 DECREASED RANGE OF MOTION OF RIGHT SHOULDER: ICD-10-CM

## 2024-12-20 PROCEDURE — 97112 NEUROMUSCULAR REEDUCATION: CPT | Mod: CQ

## 2024-12-20 PROCEDURE — 97140 MANUAL THERAPY 1/> REGIONS: CPT | Mod: CQ

## 2024-12-20 PROCEDURE — 97110 THERAPEUTIC EXERCISES: CPT | Mod: CQ

## 2024-12-20 PROCEDURE — 97530 THERAPEUTIC ACTIVITIES: CPT | Mod: CQ

## 2024-12-20 NOTE — PROGRESS NOTES
[facility]   Physical Therapy Treatment Note     Name: Skye Marte  [facility] Number: [id number]    Therapy Diagnosis:   Encounter Diagnoses   Name Primary?    Posture abnormality Yes    Decreased range of motion of right shoulder     Upper extremity weakness      Physician: Isidro Paredes MD    Visit Date: 12/20/2024  Physician Orders: PT Eval and Treat  Medical Diagnosis from Referral:   M19.011 (ICD-10-CM) - Arthritis of right acromioclavicular joint   M75.21 (ICD-10-CM) - Biceps tendinitis of right upper extremity   M75.101 (ICD-10-CM) - Tear of right rotator cuff, unspecified tear extent, unspecified whether traumatic   Evaluation Date: 9/3/2024  Authorization Period Expiration: 12/31/2024  Plan of Care Expiration: 02/21/2025   Progress Note Due: 12/31/2024  Visit # / Visits authorized: 26/40  FOTO: 2/3     Time In: 0800  Time Out: 0902  Total Billable Time: 62 minutes    FOTO next tx if appropriate    FOTO IE 9/12: 30  FOTO 10/25: 45  FOTO 3:   FOTO goal by #19: 63     Procedure by Reggie: 08/28/2024  1. Right shoulder arthroscopic rotator cuff repair.   2. Right shoulder open subpectoral biceps tenodesis  3. Right shoulder arthroscopic distal clavicle excision  4. Right shoulder arthroscopic subacromial decompression.   5. Right shoulder arthroscopic debridement labrum  Post-Op Precautions: Follow medium size rotator cuff repair      Precautions: Standard and post-op    SUBJECTIVE     Pt reports: no new complaints. Work is going well. Reports consistent compliance with HEP.    She was compliant with home exercise program.  Response to previous treatment: no adverse effects  Functional change: improved sling management    Pain: 2/10 during AROM flexion  Location: right shoulder      OBJECTIVE     DOS: 8/28/24  POD: 16 weeks, 2 days as of 12/20    Objective Measures updated at progress report unless specified.     Shoulder Passive Range of Motion: 12/20    Right Left  "  Flexion    160 deg 180 deg   ER at 0    65 deg  85 deg   ER at 45    87 deg  100+ deg   IR at 45    30 deg 48 deg     Active Range of Motion: 11/29  Shoulder Right Left   Flexion 130 170   Abduction 90 145   Functional ER Back of head C7   Functional IR Lateral hip T8     Upper Extremity Strength: 11/29   (R) UE (L) UE   Shoulder flexion: 4-/5 4+/5   Shoulder Abduction: 4-/5 4+/5   Shoulder ER NT 4/5   Shoulder IR NT 5/5   Lower Trap 3-/5 4/5   Middle Trap 3-/5 4/5   Serratus anterior 4-/5 4+/5   Rhomboids 4/5 5/5        Treatment     Skye received the treatments listed below:      THERAPEUTIC EXERCISES to develop strength, endurance, ROM, flexibility, posture, and core stabilization for 14 minutes including:    Supine AA wand flexion to tolerance 3 x 10   Supine wand ER stretch @30deg 12 x 10"  Sleeper stretch 15 x 10"   Lat stretch behind mat 10 x 10"     NP Today:   Lat robot; 20x   Flexion walkaway stretch 10 x 10"   Seated abd prop + MHP 7 min  Picnic ER to flexion; 30 x   S/L post cap stretch 12 x 10"     MANUAL THERAPY TECHNIQUES including Joint mobilizations and Soft tissue Mobilization were applied to R shoulder for 08 minutes.    Assessment of shldr ROM  Oscillations for pain guarding (gr I-II)  Inferior GH mob (gr III-IV)  Posterior GH mobs (III)  PROM within protocol limits  Scap pinning into OH flexion     NEUROMUSCULAR RE-EDUCATION ACTIVITIES to improve Balance, Coordination, Kinesthetic, Sense, Proprioception, and Posture for 26 minutes.  The following were included:     S/L ER to neutral 2# 3 x 12 x 5"   Prone T 3 x 8 x 3"   Prone Y 3 x 8 x 3"   90deg ER OTB 3 x 10  90deg IR GTB 3 x 12  FIR wall slides x 15, cueing for avoidance of protraction/ant shearing    NP:  ER 0-90 deg GTB; 3 x 10  Seated ER 90 deg flex; 0#; 3 x 10   Standing handcuff; YTB: 3 x 10   Standing T YTB 3 x 10    THERAPEUTIC ACTIVITIES to improve dynamic and functional performance for 14 minutes including:    UBE lvl 6.5 x 4'/4' " for UE/cardiovascular endurance  Standing scaption in front of mirror 2# x 40hold    NP Today:  Landmines 3 x 12   Uni wall slide 2# 3 x 12      Patient Education and Home Exercises     Home Exercises Provided and Patient Education Provided     Education provided:   - continue HEP    Written Home Exercises Provided: Patient instructed to cont prior HEP. Exercises were reviewed and Skye was able to demonstrate them prior to the end of the session.  Skye demonstrated good understanding of the education provided. See EMR under Patient Instructions for exercises provided during therapy sessions    ASSESSMENT     Skye did well today. Min cueing required during prone T/Y's for proper scap position. She was challenged by FIR wall slides with limited AAROM achieved; adder sleeper stretch to HEP to assist with this deficit. Encouraged continued compliance with HEP over the weekend. No adverse effects reported p tx.     Skye is progressing well towards her goals.   Pt prognosis is Good.     Pt will continue to benefit from skilled outpatient physical therapy to address the deficits listed in the problem list box on initial evaluation, provide pt/family education and to maximize pt's level of independence in the home and community environment.     Pt's spiritual, cultural and educational needs considered and pt agreeable to plan of care and goals.     Anticipated barriers to physical therapy: none    Goals:   Short Term Goals: 6 weeks  1. Pt will be compliant with HEP 50% of prescribed amount. MET  2. The pt to demo improvement in R shoulder PROM to by 80% of the L (MET  3.  The pt to demo tolerance to being out of the sling for 24 hours with pain <2/10 MET     Long Term Goals: 24 weeks   Pt will be compliant with % of prescribed amount.  (Progressing, not met)  Pt will score 20% improvement on FOTO Shoulder Mobility  (Progressing, not met)  The pt to improvement in AROM of R shoulder within 80% of L shoulder to  improve tolerance to OH movement  (Progressing, not met)  The pt to  Demo at least 4+/5 of RTC muscle testing to demo improvement in tolerance to activity (Progressing, not met)  The pt to tolerate lifting 10# overhead to improve tolerance to ADLs and work related activities  (Progressing, not met)  The pt will report full participation in ADLs and IADLs without restrictions related to R Shoulder.   (Progressing, not met)    PLAN     Progress PROM per protocol    Viridiana Haney, PTA

## 2024-12-23 ENCOUNTER — CLINICAL SUPPORT (OUTPATIENT)
Dept: REHABILITATION | Facility: HOSPITAL | Age: 49
End: 2024-12-23
Payer: COMMERCIAL

## 2024-12-23 DIAGNOSIS — R29.898 UPPER EXTREMITY WEAKNESS: ICD-10-CM

## 2024-12-23 DIAGNOSIS — R29.3 POSTURE ABNORMALITY: Primary | ICD-10-CM

## 2024-12-23 DIAGNOSIS — M25.611 DECREASED RANGE OF MOTION OF RIGHT SHOULDER: ICD-10-CM

## 2024-12-23 PROCEDURE — 97530 THERAPEUTIC ACTIVITIES: CPT

## 2024-12-23 PROCEDURE — 97140 MANUAL THERAPY 1/> REGIONS: CPT

## 2024-12-23 PROCEDURE — 97110 THERAPEUTIC EXERCISES: CPT

## 2024-12-23 PROCEDURE — 97112 NEUROMUSCULAR REEDUCATION: CPT

## 2024-12-23 NOTE — PROGRESS NOTES
[facility]   Physical Therapy Treatment Note     Name: Skye Marte  [facility] Number: [id number]    Therapy Diagnosis:   Encounter Diagnoses   Name Primary?    Posture abnormality Yes    Decreased range of motion of right shoulder     Upper extremity weakness      Physician: Isidro Paredes MD    Visit Date: 12/23/2024  Physician Orders: PT Eval and Treat  Medical Diagnosis from Referral:   M19.011 (ICD-10-CM) - Arthritis of right acromioclavicular joint   M75.21 (ICD-10-CM) - Biceps tendinitis of right upper extremity   M75.101 (ICD-10-CM) - Tear of right rotator cuff, unspecified tear extent, unspecified whether traumatic   Evaluation Date: 9/3/2024  Authorization Period Expiration: 12/31/2024  Plan of Care Expiration: 02/21/2025   Progress Note Due: 12/31/2024  Visit # / Visits authorized: 27/40  FOTO: 2/3     Time In: 0530 pm  Time Out: 0630 pm  Total Billable Time: 60 minutes    FOTO next tx if appropriate    FOTO IE 9/12: 30  FOTO 10/25: 45  FOTO 3:   FOTO goal by #19: 63     Procedure by Reggie: 08/28/2024  1. Right shoulder arthroscopic rotator cuff repair.   2. Right shoulder open subpectoral biceps tenodesis  3. Right shoulder arthroscopic distal clavicle excision  4. Right shoulder arthroscopic subacromial decompression.   5. Right shoulder arthroscopic debridement labrum  Post-Op Precautions: Follow medium size rotator cuff repair      Precautions: Standard and post-op    SUBJECTIVE     Pt reports: shoulder feel good  She was compliant with home exercise program.  Response to previous treatment: no adverse effects  Functional change: improved sling management    Pain: 1/10 during AROM flexion  Location: right shoulder      OBJECTIVE     DOS: 8/28/24  POD: 16 weeks, 5 days as of 12/23    Objective Measures updated at progress report unless specified.     Shoulder Passive Range of Motion: 12/20    Right Left   Flexion    160 deg 180 deg   ER at 0    65 deg  85 deg  "  ER at 45    87 deg  100+ deg   IR at 45    30 deg 48 deg     Active Range of Motion: 11/29  Shoulder Right Left   Flexion 130 170   Abduction 90 145   Functional ER Back of head C7   Functional IR Lateral hip T8     Upper Extremity Strength: 11/29   (R) UE (L) UE   Shoulder flexion: 4-/5 4+/5   Shoulder Abduction: 4-/5 4+/5   Shoulder ER NT 4/5   Shoulder IR NT 5/5   Lower Trap 3-/5 4/5   Middle Trap 3-/5 4/5   Serratus anterior 4-/5 4+/5   Rhomboids 4/5 5/5        Treatment     Skye received the treatments listed below:      THERAPEUTIC EXERCISES to develop strength, endurance, ROM, flexibility, posture, and core stabilization for 13 minutes including:  Supine AA wand flexion to tolerance 3 x 10   Supine wand ER stretch @30deg 12 x 10"  S/L post cap stretch 3 x 30"   Sleeper stretch 4 x 30"   Lat stretch behind mat 10 x 10"     NP Today:   Lat robot; 20x   Flexion walkaway stretch 10 x 10"   Seated abd prop + MHP 7 min  Picnic ER to flexion; 30 x       MANUAL THERAPY TECHNIQUES including Joint mobilizations and Soft tissue Mobilization were applied to R shoulder for 10 minutes.    Assessment of shldr ROM  Oscillations for pain guarding (gr I-II)  Inferior GH mob (gr III-IV)  Posterior GH mobs (III)  PROM within protocol limits  Scap pinning into OH flexion     NEUROMUSCULAR RE-EDUCATION ACTIVITIES to improve Balance, Coordination, Kinesthetic, Sense, Proprioception, and Posture for 23 minutes.  The following were included:     S/L ER to neutral 3# 3 x 8 x 3"   Prone T 3 x 8 x 3"   Prone Y 3 x 8 x 3"   Prone LT Lv 2.0 10 x 5"   90deg ER OTB 3 x burn  90deg IR GTB 3 x burn  FIR wall slides x 15, cueing for avoidance of protraction/ant shearing    NP Today:  ER 0-90 deg GTB; 3 x 10  Seated ER 90 deg flex; 0#; 3 x 10   Standing handcuff; YTB: 3 x 10   Standing T YTB 3 x 10    THERAPEUTIC ACTIVITIES to improve dynamic and functional performance for 15 minutes including:    UBE lvl 6.5 x 4'/4' for UE/cardiovascular " endurance  Standing scaption in front of mirror 2# x 40hold  's carry; 2#; 4 x turf    NP Today:  Landmines 3 x 12   Uni wall slide 2# 3 x 12      Patient Education and Home Exercises     Home Exercises Provided and Patient Education Provided     Education provided:   - continue HEP    Written Home Exercises Provided: Patient instructed to cont prior HEP. Exercises were reviewed and Skye was able to demonstrate them prior to the end of the session.  Skye demonstrated good understanding of the education provided. See EMR under Patient Instructions for exercises provided during therapy sessions    ASSESSMENT     Skye is continuing to progress well in functional internal rotation as well as overall range of motion. She required minimal cues for low trap vs upper trap in prone Y, which improved with regression to short lever Y. She will benefit continued progression as able.     Skye is progressing well towards her goals.   Pt prognosis is Good.     Pt will continue to benefit from skilled outpatient physical therapy to address the deficits listed in the problem list box on initial evaluation, provide pt/family education and to maximize pt's level of independence in the home and community environment.     Pt's spiritual, cultural and educational needs considered and pt agreeable to plan of care and goals.     Anticipated barriers to physical therapy: none    Goals:   Short Term Goals: 6 weeks  1. Pt will be compliant with HEP 50% of prescribed amount. MET  2. The pt to demo improvement in R shoulder PROM to by 80% of the L (MET  3.  The pt to demo tolerance to being out of the sling for 24 hours with pain <2/10 MET     Long Term Goals: 24 weeks   Pt will be compliant with % of prescribed amount.  (Progressing, not met)  Pt will score 20% improvement on FOTO Shoulder Mobility  (Progressing, not met)  The pt to improvement in AROM of R shoulder within 80% of L shoulder to improve tolerance to OH  movement  (Progressing, not met)  The pt to  Demo at least 4+/5 of RTC muscle testing to demo improvement in tolerance to activity (Progressing, not met)  The pt to tolerate lifting 10# overhead to improve tolerance to ADLs and work related activities  (Progressing, not met)  The pt will report full participation in ADLs and IADLs without restrictions related to R Shoulder.   (Progressing, not met)    PLAN     Progress PROM per protocol    Lelo Everett, PT, DPT, SCS

## 2024-12-26 ENCOUNTER — CLINICAL SUPPORT (OUTPATIENT)
Dept: REHABILITATION | Facility: HOSPITAL | Age: 49
End: 2024-12-26
Payer: COMMERCIAL

## 2024-12-26 DIAGNOSIS — M25.611 DECREASED RANGE OF MOTION OF RIGHT SHOULDER: ICD-10-CM

## 2024-12-26 DIAGNOSIS — R29.3 POSTURE ABNORMALITY: Primary | ICD-10-CM

## 2024-12-26 DIAGNOSIS — R29.898 UPPER EXTREMITY WEAKNESS: ICD-10-CM

## 2024-12-26 PROCEDURE — 97140 MANUAL THERAPY 1/> REGIONS: CPT | Mod: CQ

## 2024-12-26 PROCEDURE — 97112 NEUROMUSCULAR REEDUCATION: CPT | Mod: CQ

## 2024-12-26 PROCEDURE — 97110 THERAPEUTIC EXERCISES: CPT | Mod: CQ

## 2024-12-26 PROCEDURE — 97530 THERAPEUTIC ACTIVITIES: CPT | Mod: CQ

## 2024-12-26 NOTE — PROGRESS NOTES
[facility]   Physical Therapy Treatment Note     Name: Skye Marte  [facility] Number: [id number]    Therapy Diagnosis:   Encounter Diagnoses   Name Primary?    Posture abnormality Yes    Decreased range of motion of right shoulder     Upper extremity weakness      Physician: Isidro Paredes MD    Visit Date: 12/26/2024  Physician Orders: PT Eval and Treat  Medical Diagnosis from Referral:   M19.011 (ICD-10-CM) - Arthritis of right acromioclavicular joint   M75.21 (ICD-10-CM) - Biceps tendinitis of right upper extremity   M75.101 (ICD-10-CM) - Tear of right rotator cuff, unspecified tear extent, unspecified whether traumatic   Evaluation Date: 9/3/2024  Authorization Period Expiration: 12/31/2024  Plan of Care Expiration: 02/21/2025   Progress Note Due: 12/31/2024  Visit # / Visits authorized: 28/40  FOTO 3/3 and discharged     Time In: 1400  Time Out: 1507  Total Billable Time: 59 minutes    FOTO IE 9/12: 30  FOTO 10/25: 45  FOTO 12/26: 72  FOTO goal by #19: 66     Procedure by Reggie: 08/28/2024  1. Right shoulder arthroscopic rotator cuff repair.   2. Right shoulder open subpectoral biceps tenodesis  3. Right shoulder arthroscopic distal clavicle excision  4. Right shoulder arthroscopic subacromial decompression.   5. Right shoulder arthroscopic debridement labrum  Post-Op Precautions: Follow medium size rotator cuff repair      Precautions: Standard and post-op    SUBJECTIVE     Pt reports: shoulder feels good. Less compliant with HEP over the holiday.     She was compliant with home exercise program.  Response to previous treatment: no adverse effects  Functional change: improved sling management    Pain: 1/10 during AROM flexion  Location: right shoulder      OBJECTIVE     DOS: 8/28/24  POD: 17 weeks, 1 days as of 12/26    Objective Measures updated at progress report unless specified.     Shoulder Passive Range of Motion: 12/20    Right Left   Flexion    160 deg 180 deg  "  ER at 0    65 deg  85 deg   ER at 45    87 deg  100+ deg   IR at 45    30 deg 48 deg     Active Range of Motion: 11/29  Shoulder Right Left   Flexion 130 170   Abduction 90 145   Functional ER Back of head C7   Functional IR Lateral hip T8     Upper Extremity Strength: 11/29   (R) UE (L) UE   Shoulder flexion: 4-/5 4+/5   Shoulder Abduction: 4-/5 4+/5   Shoulder ER NT 4/5   Shoulder IR NT 5/5   Lower Trap 3-/5 4/5   Middle Trap 3-/5 4/5   Serratus anterior 4-/5 4+/5   Rhomboids 4/5 5/5        Treatment     Skye received the treatments listed below:      THERAPEUTIC EXERCISES to develop strength, endurance, ROM, flexibility, posture, and core stabilization for 16 minutes including:    Lat stretch behind mat 12 x 10"   Supine AA wand flexion to tolerance 3 x 10   Supine wand ER stretch @30deg 12 x 10"  Sleeper stretch 12 x 10"     NP Today:   Lat robot; 20x   Flexion walkaway stretch 10 x 10"   Seated abd prop + MHP 7 min  Picnic ER to flexion; 30 x   S/L post cap stretch 3 x 30"     MANUAL THERAPY TECHNIQUES including Joint mobilizations and Soft tissue Mobilization were applied to R shoulder for 08 minutes.    Assessment of shldr ROM  Oscillations for pain guarding (gr I-II)  Inferior GH mob (gr III-IV)  Posterior GH mobs (III)  PROM within protocol limits  Scap pinning into OH flexion     NEUROMUSCULAR RE-EDUCATION ACTIVITIES to improve Balance, Coordination, Kinesthetic, Sense, Proprioception, and Posture for 20 minutes.  The following were included:     S/L ER to neutral 3# 3 x 8 x 3"   Prone T 3 x 8 x 3"   FIR wall slides x 25, cueing for avoidance of protraction/ant shearing  Resisted ER @neutral -> 90deg flx OTB 3x to burn     NP Today:  ER 0-90 deg GTB; 3 x 10  Seated ER 90 deg flex; 0#; 3 x 10   Standing handcuff; YTB: 3 x 10   Standing T YTB 3 x 10  Prone Y 3 x 8 x 3"   Prone LT Lv 2.0 10 x 5"   90deg ER OTB 3 x burn  90deg IR GTB 3 x burn    THERAPEUTIC ACTIVITIES to improve dynamic and functional " performance for 15 minutes including:    UBE lvl 6.5 x 4'/4' for UE/cardiovascular endurance  1/2 kneeling SA landmines 15#bar 3 x 10    NP Today:  Landmines 3 x 12   Uni wall slide 2# 3 x 12  's carry; 2#; 4 x turf  Standing scaption in front of mirror 2# x 40hold      Patient Education and Home Exercises     Home Exercises Provided and Patient Education Provided     Education provided:   - continue HEP    Written Home Exercises Provided: Patient instructed to cont prior HEP. Exercises were reviewed and Skye was able to demonstrate them prior to the end of the session.  Skye demonstrated good understanding of the education provided. See EMR under Patient Instructions for exercises provided during therapy sessions    ASSESSMENT     Skye did well today. FIR looked better today compared to previous tx with me on 12/20. Pt was able to progress landmines to 15#bar with good tolerance/form. She was challenged by resisted ER at end of tx with cueing required to avoid IR collapse. Will continue to progress as tolerated. No adverse effects reported p tx.     Skye is progressing well towards her goals.   Pt prognosis is Good.     Pt will continue to benefit from skilled outpatient physical therapy to address the deficits listed in the problem list box on initial evaluation, provide pt/family education and to maximize pt's level of independence in the home and community environment.     Pt's spiritual, cultural and educational needs considered and pt agreeable to plan of care and goals.     Anticipated barriers to physical therapy: none    Goals:   Short Term Goals: 6 weeks  1. Pt will be compliant with HEP 50% of prescribed amount. MET  2. The pt to demo improvement in R shoulder PROM to by 80% of the L (MET  3.  The pt to demo tolerance to being out of the sling for 24 hours with pain <2/10 MET     Long Term Goals: 24 weeks   Pt will be compliant with % of prescribed amount.  (Progressing, not met)  Pt  will score 20% improvement on FOTO Shoulder Mobility  (Progressing, not met)  The pt to improvement in AROM of R shoulder within 80% of L shoulder to improve tolerance to OH movement  (Progressing, not met)  The pt to  Demo at least 4+/5 of RTC muscle testing to demo improvement in tolerance to activity (Progressing, not met)  The pt to tolerate lifting 10# overhead to improve tolerance to ADLs and work related activities  (Progressing, not met)  The pt will report full participation in ADLs and IADLs without restrictions related to R Shoulder.   (Progressing, not met)    PLAN     Progress PROM per protocol    Viridiana Haney, PTA

## 2025-01-02 ENCOUNTER — CLINICAL SUPPORT (OUTPATIENT)
Dept: REHABILITATION | Facility: HOSPITAL | Age: 50
End: 2025-01-02
Payer: COMMERCIAL

## 2025-01-02 DIAGNOSIS — R29.898 UPPER EXTREMITY WEAKNESS: ICD-10-CM

## 2025-01-02 DIAGNOSIS — M25.611 DECREASED RANGE OF MOTION OF RIGHT SHOULDER: ICD-10-CM

## 2025-01-02 DIAGNOSIS — R29.3 POSTURE ABNORMALITY: Primary | ICD-10-CM

## 2025-01-02 PROCEDURE — 97140 MANUAL THERAPY 1/> REGIONS: CPT | Mod: CQ

## 2025-01-02 PROCEDURE — 97110 THERAPEUTIC EXERCISES: CPT | Mod: CQ

## 2025-01-02 PROCEDURE — 97112 NEUROMUSCULAR REEDUCATION: CPT | Mod: CQ

## 2025-01-02 PROCEDURE — 97530 THERAPEUTIC ACTIVITIES: CPT | Mod: CQ

## 2025-01-03 NOTE — PROGRESS NOTES
[facility]   Physical Therapy Treatment Note     Name: Skye Marte  Clinic Number: [id number]    Therapy Diagnosis:   Encounter Diagnoses   Name Primary?    Posture abnormality Yes    Decreased range of motion of right shoulder     Upper extremity weakness      Physician: Isidor Paredes MD    Visit Date: 1/2/2025  Physician Orders: PT Eval and Treat  Medical Diagnosis from Referral:   M19.011 (ICD-10-CM) - Arthritis of right acromioclavicular joint   M75.21 (ICD-10-CM) - Biceps tendinitis of right upper extremity   M75.101 (ICD-10-CM) - Tear of right rotator cuff, unspecified tear extent, unspecified whether traumatic   Evaluation Date: 9/3/2024  Authorization Period Expiration: 12/31/2024  Plan of Care Expiration: 02/21/2025   Progress Note Due: 12/31/2024  Visit # / Visits authorized: 1/20  FOTO 3/3 and discharged     Time In: 1702  Time Out: 1807  Total Billable Time: 59 minutes     Procedure by Reggie: 08/28/2024  1. Right shoulder arthroscopic rotator cuff repair.   2. Right shoulder open subpectoral biceps tenodesis  3. Right shoulder arthroscopic distal clavicle excision  4. Right shoulder arthroscopic subacromial decompression.   5. Right shoulder arthroscopic debridement labrum  Post-Op Precautions: Follow medium size rotator cuff repair      Precautions: Standard and post-op    SUBJECTIVE     Pt reports: shoulder feels good today. It's sore when she's sleeping. She was able to tuck in her shirt in the back with her R arm.     She was compliant with home exercise program.  Response to previous treatment: no adverse effects  Functional change: improved sling management    Pain: 1/10 during AROM flexion  Location: right shoulder      OBJECTIVE     DOS: 8/28/24  POD: 18 weeks, 1 day as of 1/2    Objective Measures updated at progress report unless specified.     Shoulder Passive Range of Motion: 12/20    Right Left   Flexion    160 deg 180 deg   ER at 0    65 deg  85  "deg   ER at 45    87 deg  100+ deg   IR at 45    30 deg 48 deg     Active Range of Motion: 11/29  Shoulder Right Left   Flexion 130 170   Abduction 90 145   Functional ER Back of head C7   Functional IR Lateral hip T8     Upper Extremity Strength: 11/29   (R) UE (L) UE   Shoulder flexion: 4-/5 4+/5   Shoulder Abduction: 4-/5 4+/5   Shoulder ER NT 4/5   Shoulder IR NT 5/5   Lower Trap 3-/5 4/5   Middle Trap 3-/5 4/5   Serratus anterior 4-/5 4+/5   Rhomboids 4/5 5/5        Treatment     Skye received the treatments listed below:      THERAPEUTIC EXERCISES to develop strength, endurance, ROM, flexibility, posture, and core stabilization for 12 minutes including:    Supine AA wand flexion to tolerance 3 x 10   Supine wand ER stretch @30deg 10" hold x 3'  Sleeper stretch 12 x 10"     NP Today:   Lat stretch behind mat 12 x 10"   Lat robot; 20x   Flexion walkaway stretch 10 x 10"   Seated abd prop + MHP 7 min  Picnic ER to flexion; 30 x   S/L post cap stretch 3 x 30"     MANUAL THERAPY TECHNIQUES including Joint mobilizations and Soft tissue Mobilization were applied to R shoulder for 08 minutes.    Assessment of shldr ROM  Oscillations for pain guarding (gr I-II)  Inferior GH mob (gr III-IV)  Posterior GH mobs (III)  PROM within protocol limits  Scap pinning into OH flexion     NEUROMUSCULAR RE-EDUCATION ACTIVITIES to improve Balance, Coordination, Kinesthetic, Sense, Proprioception, and Posture for 24 minutes.  The following were included:     S/L ER to neutral 3# 3 x 8 x 3"   Prone T 3 x 8 x 3"   Prone Y 3 x 8  FIR wall slides x 25, cueing for avoidance of protraction/ant shearing  Resisted ER @neutral -> 90deg flx -> 90/90 ER OTB 3x to burn     NP Today:  ER 0-90 deg GTB; 3 x 10  Seated ER 90 deg flex; 0#; 3 x 10   Standing handcuff; YTB: 3 x 10   Standing T YTB 3 x 10  Prone Y 3 x 8 x 3"   Prone LT Lv 2.0 10 x 5"   90deg ER OTB 3 x burn  90deg IR GTB 3 x burn    THERAPEUTIC ACTIVITIES to improve dynamic and " functional performance for 15 minutes including:    UBE lvl 6.5 x 4'/4' for UE/cardiovascular endurance  1/2 kneeling SA landmines 15#bar 3 x 10    NP Today:  Uni wall slide 2# 3 x 12  's carry; 2#; 4 x turf  Standing scaption in front of mirror 2# x 40hold      Patient Education and Home Exercises     Home Exercises Provided and Patient Education Provided     Education provided:   - continue HEP    Written Home Exercises Provided: Patient instructed to cont prior HEP. Exercises were reviewed and Skye was able to demonstrate them prior to the end of the session.  Skye demonstrated good understanding of the education provided. See EMR under Patient Instructions for exercises provided during therapy sessions    ASSESSMENT     Skye did well today. FIR continues to improve with time. She was challenged by initiation of prone Y's and multiplanar ER exercise at end of tx; cueing required during the former for proper scap position. Will continue to progress as tolerated. No adverse effects reported p tx.     Skye is progressing well towards her goals.   Pt prognosis is Good.     Pt will continue to benefit from skilled outpatient physical therapy to address the deficits listed in the problem list box on initial evaluation, provide pt/family education and to maximize pt's level of independence in the home and community environment.     Pt's spiritual, cultural and educational needs considered and pt agreeable to plan of care and goals.     Anticipated barriers to physical therapy: none    Goals:   Short Term Goals: 6 weeks  1. Pt will be compliant with HEP 50% of prescribed amount. MET  2. The pt to demo improvement in R shoulder PROM to by 80% of the L (MET  3.  The pt to demo tolerance to being out of the sling for 24 hours with pain <2/10 MET     Long Term Goals: 24 weeks   Pt will be compliant with % of prescribed amount.  (Progressing, not met)  Pt will score 20% improvement on FOTO Shoulder  Mobility  (Progressing, not met)  The pt to improvement in AROM of R shoulder within 80% of L shoulder to improve tolerance to OH movement  (Progressing, not met)  The pt to  Demo at least 4+/5 of RTC muscle testing to demo improvement in tolerance to activity (Progressing, not met)  The pt to tolerate lifting 10# overhead to improve tolerance to ADLs and work related activities  (Progressing, not met)  The pt will report full participation in ADLs and IADLs without restrictions related to R Shoulder.   (Progressing, not met)    PLAN     Progress PROM per protocol    Viridiana Haney, PTA

## 2025-01-05 ENCOUNTER — PATIENT MESSAGE (OUTPATIENT)
Dept: SPORTS MEDICINE | Facility: CLINIC | Age: 50
End: 2025-01-05
Payer: COMMERCIAL

## 2025-01-07 ENCOUNTER — CLINICAL SUPPORT (OUTPATIENT)
Dept: REHABILITATION | Facility: HOSPITAL | Age: 50
End: 2025-01-07
Payer: COMMERCIAL

## 2025-01-07 DIAGNOSIS — M25.611 DECREASED RANGE OF MOTION OF RIGHT SHOULDER: ICD-10-CM

## 2025-01-07 DIAGNOSIS — R29.3 POSTURE ABNORMALITY: Primary | ICD-10-CM

## 2025-01-07 DIAGNOSIS — R29.898 UPPER EXTREMITY WEAKNESS: ICD-10-CM

## 2025-01-07 PROCEDURE — 97112 NEUROMUSCULAR REEDUCATION: CPT | Mod: CQ

## 2025-01-07 PROCEDURE — 97110 THERAPEUTIC EXERCISES: CPT | Mod: CQ

## 2025-01-07 PROCEDURE — 97530 THERAPEUTIC ACTIVITIES: CPT | Mod: CQ

## 2025-01-07 PROCEDURE — 97140 MANUAL THERAPY 1/> REGIONS: CPT | Mod: CQ

## 2025-01-08 NOTE — PROGRESS NOTES
[facility]   Physical Therapy Treatment Note     Name: Skye Marte  [facility] Number: [id number]    Therapy Diagnosis:   Encounter Diagnoses   Name Primary?    Posture abnormality Yes    Decreased range of motion of right shoulder     Upper extremity weakness      Physician: Isidro Paredes MD    Visit Date: 1/7/2025  Physician Orders: PT Eval and Treat  Medical Diagnosis from Referral:   M19.011 (ICD-10-CM) - Arthritis of right acromioclavicular joint   M75.21 (ICD-10-CM) - Biceps tendinitis of right upper extremity   M75.101 (ICD-10-CM) - Tear of right rotator cuff, unspecified tear extent, unspecified whether traumatic   Evaluation Date: 9/3/2024  Authorization Period Expiration: 12/31/2024  Plan of Care Expiration: 02/21/2025   Progress Note Due: 12/31/2024  Visit # / Visits authorized: 1/20  FOTO 3/3 and discharged     Time In: 1700  Time Out: 1805  Total Billable Time: 61 minutes     Procedure by Reggie: 08/28/2024  1. Right shoulder arthroscopic rotator cuff repair.   2. Right shoulder open subpectoral biceps tenodesis  3. Right shoulder arthroscopic distal clavicle excision  4. Right shoulder arthroscopic subacromial decompression.   5. Right shoulder arthroscopic debridement labrum  Post-Op Precautions: Follow medium size rotator cuff repair      Precautions: Standard and post-op    SUBJECTIVE     Pt reports: shoulder feels good today.     She was compliant with home exercise program.  Response to previous treatment: no adverse effects  Functional change: improved sling management    Pain: 1/10 during AROM flexion  Location: right shoulder      OBJECTIVE     DOS: 8/28/24  POD: 18 weeks, 6 days as of 1/7    Objective Measures updated at progress report unless specified.     Shoulder Passive Range of Motion: 12/20    Right Left   Flexion    160 deg 180 deg   ER at 0    65 deg  85 deg   ER at 45    87 deg  100+ deg   IR at 45    30 deg 48 deg     Active Range of Motion:  "11/29  Shoulder Right Left   Flexion 130 170   Abduction 90 145   Functional ER Back of head C7   Functional IR Lateral hip T8     Upper Extremity Strength: 11/29   (R) UE (L) UE   Shoulder flexion: 4-/5 4+/5   Shoulder Abduction: 4-/5 4+/5   Shoulder ER NT 4/5   Shoulder IR NT 5/5   Lower Trap 3-/5 4/5   Middle Trap 3-/5 4/5   Serratus anterior 4-/5 4+/5   Rhomboids 4/5 5/5        Treatment     Skye received the treatments listed below:      THERAPEUTIC EXERCISES to develop strength, endurance, ROM, flexibility, posture, and core stabilization for 15 minutes including:    Lat stretch behind mat 12 x 10"   Supine AA wand flexion to tolerance 3 x 10   Supine wand ER stretch @30deg 10" hold x 3'  Sleeper stretch 12 x 10"     NP Today:   Lat robot; 20x   Flexion walkaway stretch 10 x 10"   Seated abd prop + MHP 7 min  Picnic ER to flexion; 30 x   S/L post cap stretch 3 x 30"     MANUAL THERAPY TECHNIQUES including Joint mobilizations and Soft tissue Mobilization were applied to R shoulder for 08 minutes.    Assessment of shldr ROM  Oscillations for pain guarding (gr I-II)  Inferior GH mob (gr III-IV)  Posterior GH mobs (III)  PROM within protocol limits  Scap pinning into OH flexion     NEUROMUSCULAR RE-EDUCATION ACTIVITIES to improve Balance, Coordination, Kinesthetic, Sense, Proprioception, and Posture for 24 minutes.  The following were included:     S/L ER to neutral 3# 3 x 8 x 3"   Prone T 1# 3 x 8 x 3"   Prone Y 3 x 8  FIR wall slides x 25, cueing for avoidance of protraction/ant shearing  Resisted ER @neutral -> 90deg flx -> 90/90 ER OTB 3x to burn     NP Today:  ER 0-90 deg GTB; 3 x 10  Seated ER 90 deg flex; 0#; 3 x 10   Standing handcuff; YTB: 3 x 10   Standing T YTB 3 x 10  Prone Y 3 x 8 x 3"   Prone LT Lv 2.0 10 x 5"   90deg ER OTB 3 x burn  90deg IR GTB 3 x burn    THERAPEUTIC ACTIVITIES to improve dynamic and functional performance for 14 minutes including:    UBE lvl 6.5 x 4'/4' for UE/cardiovascular " endurance  Standing scaption in front of mirror 2# x 40hold    NP Today:  Uni wall slide 2# 3 x 12  's carry; 2#; 4 x turf  1/2 kneeling SA landmines 15#bar 3 x 10      Patient Education and Home Exercises     Home Exercises Provided and Patient Education Provided     Education provided:   - continue HEP    Written Home Exercises Provided: Patient instructed to cont prior HEP. Exercises were reviewed and Skye was able to demonstrate them prior to the end of the session.  Skye demonstrated good understanding of the education provided. See EMR under Patient Instructions for exercises provided during therapy sessions    ASSESSMENT     Skye did well today. FIR continues to improve with time. She was able to progress prone T's with good postural control noted; however, she remains challenged by unweighted prone Y's. Will continue to progress as tolerated. No adverse effects reported p tx.     Skye is progressing well towards her goals.   Pt prognosis is Good.     Pt will continue to benefit from skilled outpatient physical therapy to address the deficits listed in the problem list box on initial evaluation, provide pt/family education and to maximize pt's level of independence in the home and community environment.     Pt's spiritual, cultural and educational needs considered and pt agreeable to plan of care and goals.     Anticipated barriers to physical therapy: none    Goals:   Short Term Goals: 6 weeks  1. Pt will be compliant with HEP 50% of prescribed amount. MET  2. The pt to demo improvement in R shoulder PROM to by 80% of the L (MET  3.  The pt to demo tolerance to being out of the sling for 24 hours with pain <2/10 MET     Long Term Goals: 24 weeks   Pt will be compliant with % of prescribed amount.  (Progressing, not met)  Pt will score 20% improvement on FOTO Shoulder Mobility  (Progressing, not met)  The pt to improvement in AROM of R shoulder within 80% of L shoulder to improve  tolerance to OH movement  (Progressing, not met)  The pt to  Demo at least 4+/5 of RTC muscle testing to demo improvement in tolerance to activity (Progressing, not met)  The pt to tolerate lifting 10# overhead to improve tolerance to ADLs and work related activities  (Progressing, not met)  The pt will report full participation in ADLs and IADLs without restrictions related to R Shoulder.   (Progressing, not met)    PLAN     Progress PROM per protocol    Viridiana Haney, PTA

## 2025-01-13 ENCOUNTER — CLINICAL SUPPORT (OUTPATIENT)
Dept: REHABILITATION | Facility: HOSPITAL | Age: 50
End: 2025-01-13
Payer: COMMERCIAL

## 2025-01-13 DIAGNOSIS — R29.898 UPPER EXTREMITY WEAKNESS: ICD-10-CM

## 2025-01-13 DIAGNOSIS — M25.611 DECREASED RANGE OF MOTION OF RIGHT SHOULDER: ICD-10-CM

## 2025-01-13 DIAGNOSIS — R29.3 POSTURE ABNORMALITY: Primary | ICD-10-CM

## 2025-01-13 PROCEDURE — 97112 NEUROMUSCULAR REEDUCATION: CPT

## 2025-01-13 PROCEDURE — 97110 THERAPEUTIC EXERCISES: CPT

## 2025-01-13 PROCEDURE — 97530 THERAPEUTIC ACTIVITIES: CPT

## 2025-01-13 NOTE — PROGRESS NOTES
"[facility]   Physical Therapy Treatment Note     Name: Skye Marte  Clinic Number: [id number]    Therapy Diagnosis:   Encounter Diagnoses   Name Primary?    Posture abnormality Yes    Decreased range of motion of right shoulder     Upper extremity weakness      Physician: Isidro Paredes MD    Visit Date: 1/13/2025  Physician Orders: PT Eval and Treat  Medical Diagnosis from Referral:   M19.011 (ICD-10-CM) - Arthritis of right acromioclavicular joint   M75.21 (ICD-10-CM) - Biceps tendinitis of right upper extremity   M75.101 (ICD-10-CM) - Tear of right rotator cuff, unspecified tear extent, unspecified whether traumatic   Evaluation Date: 9/3/2024  Authorization Period Expiration: 12/31/2024  Plan of Care Expiration: 02/21/2025   Progress Note Due: 12/31/2024  Visit # / Visits authorized: 3/20  FOTO 3/3 and discharged     Time In: 0537 pm (pt late)  Time Out: 0630 pm  Total Billable Time: 53 minutes     Procedure by Reggie: 08/28/2024  1. Right shoulder arthroscopic rotator cuff repair.   2. Right shoulder open subpectoral biceps tenodesis  3. Right shoulder arthroscopic distal clavicle excision  4. Right shoulder arthroscopic subacromial decompression.   5. Right shoulder arthroscopic debridement labrum  Post-Op Precautions: Follow medium size rotator cuff repair      Precautions: Standard and post-op    SUBJECTIVE     Pt reports: "no complaints"     She was compliant with home exercise program.  Response to previous treatment: no adverse effects  Functional change: improved sling management    Pain: 1/10 during AROM flexion  Location: right shoulder      OBJECTIVE     DOS: 8/28/24  POD: 4 month, 16 days as of 1/13    Objective Measures updated at progress report unless specified.     Shoulder Passive Range of Motion: 12/20    Right Left   Flexion    160 deg 180 deg   ER at 0    65 deg  85 deg   ER at 45    87 deg  100+ deg   IR at 45    30 deg 48 deg     Active Range of " "Motion: 11/29  Shoulder Right Left   Flexion 130 170   Abduction 90 145   Functional ER Back of head C7   Functional IR Lateral hip T8     Upper Extremity Strength: 11/29   (R) UE (L) UE   Shoulder flexion: 4-/5 4+/5   Shoulder Abduction: 4-/5 4+/5   Shoulder ER NT 4/5   Shoulder IR NT 5/5   Lower Trap 3-/5 4/5   Middle Trap 3-/5 4/5   Serratus anterior 4-/5 4+/5   Rhomboids 4/5 5/5        Treatment     Skye received the treatments listed below:      THERAPEUTIC EXERCISES to develop strength, endurance, ROM, flexibility, posture, and core stabilization for 15 minutes including:    Lat stretch behind mat 12 x 10"   Supine AA wand flexion to tolerance 3 x 10   Supine wand ER stretch @30deg 10" hold x 3'  Sleeper stretch 12 x 10"     NP Today:   Lat robot; 20x   Flexion walkaway stretch 10 x 10"   Seated abd prop + MHP 7 min  Picnic ER to flexion; 30 x   S/L post cap stretch 3 x 30"     MANUAL THERAPY TECHNIQUES including Joint mobilizations and Soft tissue Mobilization were applied to R shoulder for 05 minutes.    Assessment of shldr ROM  Oscillations for pain guarding (gr I-II)  Inferior GH mob (gr III-IV)  Posterior GH mobs (III)  PROM within protocol limits  Scap pinning into OH flexion     NEUROMUSCULAR RE-EDUCATION ACTIVITIES to improve Balance, Coordination, Kinesthetic, Sense, Proprioception, and Posture for 23 minutes.  The following were included:   Resisted ER @neutral -> 90deg flx -> 90/90 ER OTB 3x to burn   Standing T YTB 3 x 10  Standing D2; YTB; 3 x 12  S/L ER to neutral 3# 3 x 10 x 3"     NP Today:  ER 0-90 deg GTB; 3 x 10  Seated ER 90 deg flex; 0#; 3 x 10   90deg ER OTB 3 x burn  90deg IR GTB 3 x burn  Prone T 1# 3 x 8 x 3"   Prone Y 3 x 8  FIR wall slides x 25, cueing for avoidance of protraction/ant shearing    THERAPEUTIC ACTIVITIES to improve dynamic and functional performance for 10 minutes including:  UBE lvl 6.5 x 4'/4' for UE/cardiovascular endurance  Handcuff to OH; GTB 3 x 10    NP " Today:  Uni wall slide 2# 3 x 12  's carry; 2#; 4 x turf  1/2 kneeling SA landmines 15#bar 3 x 10  Standing scaption in front of mirror 2# x 40hold      Patient Education and Home Exercises     Home Exercises Provided and Patient Education Provided     Education provided:   - continue HEP    Written Home Exercises Provided: Patient instructed to cont prior HEP. Exercises were reviewed and Skye was able to demonstrate them prior to the end of the session.  Skye demonstrated good understanding of the education provided. See EMR under Patient Instructions for exercises provided during therapy sessions    ASSESSMENT     Skye demonstrate full active flexion range of motion today following D2 flexion training with cues for scapular posterior tilt at end of session. She will benefit continued progression of this for maintenance and carry over of range and control. Will progress as able.     Skye is progressing well towards her goals.   Pt prognosis is Good.     Pt will continue to benefit from skilled outpatient physical therapy to address the deficits listed in the problem list box on initial evaluation, provide pt/family education and to maximize pt's level of independence in the home and community environment.     Pt's spiritual, cultural and educational needs considered and pt agreeable to plan of care and goals.     Anticipated barriers to physical therapy: none    Goals:   Short Term Goals: 6 weeks  1. Pt will be compliant with HEP 50% of prescribed amount. MET  2. The pt to demo improvement in R shoulder PROM to by 80% of the L (MET  3.  The pt to demo tolerance to being out of the sling for 24 hours with pain <2/10 MET     Long Term Goals: 24 weeks   Pt will be compliant with % of prescribed amount.  (Progressing, not met)  Pt will score 20% improvement on FOTO Shoulder Mobility  (Progressing, not met)  The pt to improvement in AROM of R shoulder within 80% of L shoulder to improve tolerance to  OH movement  (Progressing, not met)  The pt to  Demo at least 4+/5 of RTC muscle testing to demo improvement in tolerance to activity (Progressing, not met)  The pt to tolerate lifting 10# overhead to improve tolerance to ADLs and work related activities  (Progressing, not met)  The pt will report full participation in ADLs and IADLs without restrictions related to R Shoulder.   (Progressing, not met)    PLAN     Progress PROM per protocol    Lelo Everett, PT

## 2025-01-17 ENCOUNTER — TELEPHONE (OUTPATIENT)
Dept: SPORTS MEDICINE | Facility: CLINIC | Age: 50
End: 2025-01-17
Payer: COMMERCIAL

## 2025-01-17 ENCOUNTER — CLINICAL SUPPORT (OUTPATIENT)
Dept: REHABILITATION | Facility: HOSPITAL | Age: 50
End: 2025-01-17
Payer: COMMERCIAL

## 2025-01-17 DIAGNOSIS — R29.3 POSTURE ABNORMALITY: Primary | ICD-10-CM

## 2025-01-17 DIAGNOSIS — M25.611 DECREASED RANGE OF MOTION OF RIGHT SHOULDER: ICD-10-CM

## 2025-01-17 DIAGNOSIS — R29.898 UPPER EXTREMITY WEAKNESS: ICD-10-CM

## 2025-01-17 PROCEDURE — 97530 THERAPEUTIC ACTIVITIES: CPT | Mod: CQ

## 2025-01-17 PROCEDURE — 97110 THERAPEUTIC EXERCISES: CPT | Mod: CQ

## 2025-01-17 PROCEDURE — 97140 MANUAL THERAPY 1/> REGIONS: CPT | Mod: CQ

## 2025-01-17 PROCEDURE — 97112 NEUROMUSCULAR REEDUCATION: CPT | Mod: CQ

## 2025-01-17 NOTE — PROGRESS NOTES
[facility]   Physical Therapy Treatment Note     Name: Skye Marte  [facility] Number: [id number]    Therapy Diagnosis:   Encounter Diagnoses   Name Primary?    Posture abnormality Yes    Decreased range of motion of right shoulder     Upper extremity weakness        Physician: Isidro Paredes MD    Visit Date: 1/17/2025  Physician Orders: PT Eval and Treat  Medical Diagnosis from Referral:   M19.011 (ICD-10-CM) - Arthritis of right acromioclavicular joint   M75.21 (ICD-10-CM) - Biceps tendinitis of right upper extremity   M75.101 (ICD-10-CM) - Tear of right rotator cuff, unspecified tear extent, unspecified whether traumatic   Evaluation Date: 9/3/2024  Authorization Period Expiration: 12/31/2024  Plan of Care Expiration: 02/21/2025   Progress Note Due: 12/31/2024  Visit # / Visits authorized: 3/20  FOTO 3/3 and discharged     Time In: 0800  Time Out: 0906  Total Billable Time: 63 minutes     Procedure by Reggie: 08/28/2024  1. Right shoulder arthroscopic rotator cuff repair.   2. Right shoulder open subpectoral biceps tenodesis  3. Right shoulder arthroscopic distal clavicle excision  4. Right shoulder arthroscopic subacromial decompression.   5. Right shoulder arthroscopic debridement labrum  Post-Op Precautions: Follow medium size rotator cuff repair      Precautions: Standard and post-op    SUBJECTIVE     Pt reports: overall she is doing well. She thinks she slept on it wrong last night though.     She was compliant with home exercise program.  Response to previous treatment: no adverse effects  Functional change: improved sling management    Pain: 1/10 during AROM flexion  Location: right shoulder      OBJECTIVE     DOS: 8/28/24  POD: 4 month, 20 days as of 1/17    Objective Measures updated at progress report unless specified.     Shoulder Passive Range of Motion: 12/20    Right Left   Flexion    160 deg 180 deg   ER at 0    65 deg  85 deg   ER at 45    87 deg  100+ deg  "  IR at 45    30 deg 48 deg     Active Range of Motion: 11/29  Shoulder Right Left   Flexion 130 170   Abduction 90 145   Functional ER Back of head C7   Functional IR Lateral hip T8     Upper Extremity Strength: 11/29   (R) UE (L) UE   Shoulder flexion: 4-/5 4+/5   Shoulder Abduction: 4-/5 4+/5   Shoulder ER NT 4/5   Shoulder IR NT 5/5   Lower Trap 3-/5 4/5   Middle Trap 3-/5 4/5   Serratus anterior 4-/5 4+/5   Rhomboids 4/5 5/5        Treatment     Skye received the treatments listed below:      THERAPEUTIC EXERCISES to develop strength, endurance, ROM, flexibility, posture, and core stabilization for 15 minutes including:    Flexion walkaway stretch 10 x 10"   Lat stretch behind mat 10 x 10"   Supine AA wand flexion to tolerance 3 x 10   Supine wand ER stretch @0deg 10 x 10"   Sleeper stretch 12 x 10"     NP Today:   Lat robot; 20x     Seated abd prop + MHP 7 min  Picnic ER to flexion; 30 x   S/L post cap stretch 3 x 30"     MANUAL THERAPY TECHNIQUES including Joint mobilizations and Soft tissue Mobilization were applied to R shoulder for 08 minutes.    Assessment of shldr ROM  Oscillations for pain guarding (gr I-II)  Inferior GH mob (gr III-IV)  Posterior GH mobs (III)  PROM within protocol limits  Scap pinning into OH flexion     NEUROMUSCULAR RE-EDUCATION ACTIVITIES to improve Balance, Coordination, Kinesthetic, Sense, Proprioception, and Posture for 20 minutes.  The following were included:     S/L ER to neutral 3# 4 x 10 x 5"   Prone T 1# 2 x 10 x 3"   Prone Y 1# 2 x 10  Resisted ER @neutral -> 90deg flx -> 90/90 ER OTB 3x to burn     NP Today:  Standing T YTB 3 x 10  Standing D2; YTB; 3 x 12  ER 0-90 deg GTB; 3 x 10  Seated ER 90 deg flex; 0#; 3 x 10   90deg ER OTB 3 x burn  90deg IR GTB 3 x burn  FIR wall slides x 25, cueing for avoidance of protraction/ant shearing    THERAPEUTIC ACTIVITIES to improve dynamic and functional performance for 20 minutes including:    UBE lvl 7 x 4'/4' for " UE/cardiovascular endurance  Standing scaption in front of mirror 3# x 40hold   carries 3# x 3 turf laps  OH carries 3# x 3 turf laps    NP Today:  Uni wall slide 2# 3 x 12  1/2 kneeling SA landmines 15#bar 3 x 10  Handcuff to OH; GTB 3 x 10      Patient Education and Home Exercises     Home Exercises Provided and Patient Education Provided     Education provided:   - continue HEP    Written Home Exercises Provided: Patient instructed to cont prior HEP. Exercises were reviewed and Skye was able to demonstrate them prior to the end of the session.  Skye demonstrated good understanding of the education provided. See EMR under Patient Instructions for exercises provided during therapy sessions    ASSESSMENT     Skye did well today. Minimal posterior capsular restrictions noted during manual therapy, but FIR continues to improve with time. Progressed OH activities today with good tolerance. Will assess response to today's tx and progress as appropriate. No adverse effects reported p tx.     Skye is progressing well towards her goals.   Pt prognosis is Good.     Pt will continue to benefit from skilled outpatient physical therapy to address the deficits listed in the problem list box on initial evaluation, provide pt/family education and to maximize pt's level of independence in the home and community environment.     Pt's spiritual, cultural and educational needs considered and pt agreeable to plan of care and goals.     Anticipated barriers to physical therapy: none    Goals:   Short Term Goals: 6 weeks  1. Pt will be compliant with HEP 50% of prescribed amount. MET  2. The pt to demo improvement in R shoulder PROM to by 80% of the L (MET  3.  The pt to demo tolerance to being out of the sling for 24 hours with pain <2/10 MET     Long Term Goals: 24 weeks   Pt will be compliant with % of prescribed amount.  (Progressing, not met)  Pt will score 20% improvement on FOTO Shoulder Mobility   (Progressing, not met)  The pt to improvement in AROM of R shoulder within 80% of L shoulder to improve tolerance to OH movement  (Progressing, not met)  The pt to  Demo at least 4+/5 of RTC muscle testing to demo improvement in tolerance to activity (Progressing, not met)  The pt to tolerate lifting 10# overhead to improve tolerance to ADLs and work related activities  (Progressing, not met)  The pt will report full participation in ADLs and IADLs without restrictions related to R Shoulder.   (Progressing, not met)    PLAN     Progress PROM per protocol    Viridiana Haney, PTA

## 2025-01-17 NOTE — TELEPHONE ENCOUNTER
Spoke to patient that she received a denial for cpt codes after her surgery from her insurance. She reports that a final appeal needs to be done and is wondering what needs to be done and if she will be charged the amount that her insurance is saying that she may pay because of the denial.   Patient is frustrated and worried that she will be charged. She spoke to finance and preservice and has been told that they do not know what is going on.     Patient asked if she received a bill from [facility] for those amounts and she said no.     Patient told that after surgery preservice resends codes to be authorized. Insurance will tell us what they will pay for and if it is a combo code. Patient told that she needs to check her billing if she receives a bill for this.     She will call her insurance to find out about final appeal and message the team back.   
DASH/TLC (sodium and cholesterol restricted diet)/consistent carbohydrate (no snacks)

## 2025-01-20 ENCOUNTER — PATIENT MESSAGE (OUTPATIENT)
Dept: REHABILITATION | Facility: HOSPITAL | Age: 50
End: 2025-01-20
Payer: COMMERCIAL

## 2025-01-25 ENCOUNTER — PATIENT MESSAGE (OUTPATIENT)
Dept: REHABILITATION | Facility: HOSPITAL | Age: 50
End: 2025-01-25
Payer: COMMERCIAL

## 2025-01-28 ENCOUNTER — CLINICAL SUPPORT (OUTPATIENT)
Dept: REHABILITATION | Facility: HOSPITAL | Age: 50
End: 2025-01-28
Payer: COMMERCIAL

## 2025-01-28 DIAGNOSIS — M25.611 DECREASED RANGE OF MOTION OF RIGHT SHOULDER: ICD-10-CM

## 2025-01-28 DIAGNOSIS — R29.898 UPPER EXTREMITY WEAKNESS: ICD-10-CM

## 2025-01-28 DIAGNOSIS — R29.3 POSTURE ABNORMALITY: Primary | ICD-10-CM

## 2025-01-28 PROCEDURE — 97110 THERAPEUTIC EXERCISES: CPT | Mod: CQ

## 2025-01-28 PROCEDURE — 97140 MANUAL THERAPY 1/> REGIONS: CPT | Mod: CQ

## 2025-01-28 PROCEDURE — 97530 THERAPEUTIC ACTIVITIES: CPT | Mod: CQ

## 2025-01-28 PROCEDURE — 97112 NEUROMUSCULAR REEDUCATION: CPT | Mod: CQ

## 2025-01-30 ENCOUNTER — OFFICE VISIT (OUTPATIENT)
Dept: SPORTS MEDICINE | Facility: CLINIC | Age: 50
End: 2025-01-30
Payer: COMMERCIAL

## 2025-01-30 VITALS
WEIGHT: 125.31 LBS | SYSTOLIC BLOOD PRESSURE: 101 MMHG | HEIGHT: 63 IN | BODY MASS INDEX: 22.2 KG/M2 | HEART RATE: 63 BPM | DIASTOLIC BLOOD PRESSURE: 65 MMHG

## 2025-01-30 DIAGNOSIS — Z98.890 S/P ROTATOR CUFF REPAIR: Primary | ICD-10-CM

## 2025-01-30 PROCEDURE — 99999 PR PBB SHADOW E&M-EST. PATIENT-LVL IV: CPT | Mod: PBBFAC,,, | Performed by: ORTHOPAEDIC SURGERY

## 2025-01-30 NOTE — PROGRESS NOTES
[facility]   Physical Therapy Treatment Note     Name: Skye Marte  [facility] Number: [id number]    Therapy Diagnosis:   Encounter Diagnoses   Name Primary?    Posture abnormality Yes    Decreased range of motion of right shoulder     Upper extremity weakness        Physician: Isidro Paredes MD    Visit Date: 1/28/2025  Physician Orders: PT Eval and Treat  Medical Diagnosis from Referral:   M19.011 (ICD-10-CM) - Arthritis of right acromioclavicular joint   M75.21 (ICD-10-CM) - Biceps tendinitis of right upper extremity   M75.101 (ICD-10-CM) - Tear of right rotator cuff, unspecified tear extent, unspecified whether traumatic   Evaluation Date: 9/3/2024  Authorization Period Expiration: 12/31/2024  Plan of Care Expiration: 02/21/2025   Progress Note Due: 12/31/2024  Visit # / Visits authorized: 5/20  FOTO 3/3 and discharged     Time In: 1702  Time Out: 1808  Total Billable Time: 65 minutes     Procedure by Reggie: 08/28/2024  1. Right shoulder arthroscopic rotator cuff repair.   2. Right shoulder open subpectoral biceps tenodesis  3. Right shoulder arthroscopic distal clavicle excision  4. Right shoulder arthroscopic subacromial decompression.   5. Right shoulder arthroscopic debridement labrum  Post-Op Precautions: Follow medium size rotator cuff repair      Precautions: Standard and post-op    SUBJECTIVE     Pt reports: overall she is doing well. She thinks she slept on it wrong last night though.     She was compliant with home exercise program.  Response to previous treatment: no adverse effects  Functional change: improved sling management    Pain: 1/10 during AROM flexion  Location: right shoulder      OBJECTIVE     DOS: 8/28/24  POD: 5 months as of 1/28    Objective Measures updated at progress report unless specified.     Shoulder Passive Range of Motion: 12/20    Right Left   Flexion    160 deg 180 deg   ER at 0    65 deg  85 deg   ER at 45    87 deg  100+ deg   IR at  "45    30 deg 48 deg     Active Range of Motion: 11/29  Shoulder Right Left   Flexion 130 170   Abduction 90 145   Functional ER Back of head C7   Functional IR Lateral hip T8     Upper Extremity Strength: 11/29   (R) UE (L) UE   Shoulder flexion: 4-/5 4+/5   Shoulder Abduction: 4-/5 4+/5   Shoulder ER NT 4/5   Shoulder IR NT 5/5   Lower Trap 3-/5 4/5   Middle Trap 3-/5 4/5   Serratus anterior 4-/5 4+/5   Rhomboids 4/5 5/5        Treatment     Skye received the treatments listed below:      THERAPEUTIC EXERCISES to develop strength, endurance, ROM, flexibility, posture, and core stabilization for 15 minutes including:    Flexion walkaway stretch 10 x 10"   Lat stretch behind mat 10 x 10"   Supine AA wand flexion to tolerance 3 x 10   Supine wand ER stretch @0deg 10 x 10"   Sleeper stretch 12 x 10"     NP Today:   Lat robot; 20x   Seated abd prop + MHP 7 min  Picnic ER to flexion; 30 x   S/L post cap stretch 3 x 30"     MANUAL THERAPY TECHNIQUES including Joint mobilizations and Soft tissue Mobilization were applied to R shoulder for 08 minutes.    Assessment of shldr ROM  Oscillations for pain guarding (gr I-II)  Inferior GH mob (gr III-IV)  Posterior GH mobs (III)  PROM within protocol limits  Scap pinning into OH flexion     NEUROMUSCULAR RE-EDUCATION ACTIVITIES to improve Balance, Coordination, Kinesthetic, Sense, Proprioception, and Posture for 26 minutes.  The following were included:     S/L ER to neutral 3# 3 x 15 x 5"   Prone T 1# 3 x 8 x 3"   Prone Y 1# 3 x 8  Resisted ER @neutral -> 90deg flx -> 90/90 ER OTB 3x to burn   D2 flx OTB 3 x 12    NP Today:  Standing T YTB 3 x 10  Standing D2; YTB; 3 x 12  ER 0-90 deg GTB; 3 x 10  Seated ER 90 deg flex; 0#; 3 x 10   90deg ER OTB 3 x burn  90deg IR GTB 3 x burn  FIR wall slides x 25, cueing for avoidance of protraction/ant shearing    THERAPEUTIC ACTIVITIES to improve dynamic and functional performance for 16 minutes including:    UBE lvl 7 x 4'/4' for " UE/cardiovascular endurance   carries 5# x 3 turf laps  OH carries 5# x 3 turf laps    NP Today:  Uni wall slide 2# 3 x 12  1/2 kneeling SA landmines 15#bar 3 x 10  Handcuff to OH; GTB 3 x 10  Standing scaption in front of mirror 3# x 40hold      Patient Education and Home Exercises     Home Exercises Provided and Patient Education Provided     Education provided:   - continue HEP    Written Home Exercises Provided: Patient instructed to cont prior HEP. Exercises were reviewed and Skye was able to demonstrate them prior to the end of the session.  Skye demonstrated good understanding of the education provided. See EMR under Patient Instructions for exercises provided during therapy sessions    ASSESSMENT     Skye did well today. Minimal posterior capsular restrictions noted during manual therapy, but FIR continues to improve with time. Progressed OH activities today with good tolerance. Cueing required during initiation of resisted D2 flx for proper sequencing/form. Will assess response to today's tx and progress as appropriate. No adverse effects reported p tx.     Skye is progressing well towards her goals.   Pt prognosis is Good.     Pt will continue to benefit from skilled outpatient physical therapy to address the deficits listed in the problem list box on initial evaluation, provide pt/family education and to maximize pt's level of independence in the home and community environment.     Pt's spiritual, cultural and educational needs considered and pt agreeable to plan of care and goals.     Anticipated barriers to physical therapy: none    Goals:   Short Term Goals: 6 weeks  1. Pt will be compliant with HEP 50% of prescribed amount. MET  2. The pt to demo improvement in R shoulder PROM to by 80% of the L (MET  3.  The pt to demo tolerance to being out of the sling for 24 hours with pain <2/10 MET     Long Term Goals: 24 weeks   Pt will be compliant with % of prescribed amount.   (Progressing, not met)  Pt will score 20% improvement on FOTO Shoulder Mobility  (Progressing, not met)  The pt to improvement in AROM of R shoulder within 80% of L shoulder to improve tolerance to OH movement  (Progressing, not met)  The pt to  Demo at least 4+/5 of RTC muscle testing to demo improvement in tolerance to activity (Progressing, not met)  The pt to tolerate lifting 10# overhead to improve tolerance to ADLs and work related activities  (Progressing, not met)  The pt will report full participation in ADLs and IADLs without restrictions related to R Shoulder.   (Progressing, not met)    PLAN     Progress PROM per protocol    Viridiana Haney, PTA

## 2025-01-30 NOTE — PROGRESS NOTES
CC: Right shoulder post op 5 months    Patient is here for her 5 months post op appointment s/p below and is doing well. Patient is doing PT at [facility] and is progressing well.     DATE OF SURGERY:  8/28/2024      PREOPERATIVE DIAGNOSES:   1. Right shoulder rotator cuff tear.   2. Right shoulder biceps tendinopathy  3. Right shoulder AC joint arthritis  4. Right shoulder labral tear     POSTOPERATIVE DIAGNOSES:   1. Right shoulder rotator cuff tear.   2. Right shoulder biceps tendinopathy  3. Right shoulder AC joint arthritis  4. Right shoulder labral tear     PROCEDURE:   1. Right shoulder arthroscopic rotator cuff repair.   2. Right shoulder open subpectoral biceps tenodesis  3. Right shoulder arthroscopic distal clavicle excision  4. Right shoulder arthroscopic subacromial decompression.   5. Right shoulder arthroscopic debridement labrum     SURGEON: Isidro Paredes M.D.     Pain well tolerated on pain medication  Sling in place  No issues reported      Past Medical History:   Diagnosis Date    Hypothyroidism     Thyroid nodule        Past Surgical History:   Procedure Laterality Date    ARTHROSCOPIC DEBRIDEMENT OF ROTATOR CUFF Right 8/28/2024    Procedure: DEBRIDEMENT, ROTATOR CUFF, ARTHROSCOPIC--POLAR CARE;  Surgeon: Isidro Paredes MD;  Location: [facility] OR;  Service: Orthopedics;  Laterality: Right;  regional with catheter    ARTHROSCOPIC REPAIR OF ROTATOR CUFF OF SHOULDER  8/28/2024    Procedure: REPAIR, ROTATOR CUFF, ARTHROSCOPIC;  Surgeon: Isidro Paredes MD;  Location: [facility] OR;  Service: Orthopedics;;    ARTHROSCOPY OF SHOULDER WITH DECOMPRESSION OF SUBACROMIAL SPACE Right 8/28/2024    Procedure: ARTHROSCOPY, SHOULDER, WITH SUBACROMIAL SPACE DECOMPRESSION;  Surgeon: Isidro Paredes MD;  Location: [facility] OR;  Service: Orthopedics;  Laterality: Right;    ARTHROSCOPY OF SHOULDER WITH REMOVAL OF DISTAL CLAVICLE Right 8/28/2024    Procedure: ARTHROSCOPY, SHOULDER, WITH DISTAL CLAVICLE  EXCISION;  Surgeon: Isidro Paredes MD;  Location: [facility] OR;  Service: Orthopedics;  Laterality: Right;    COLONOSCOPY      TENODESIS, BICEPS, OPEN Right 8/28/2024    Procedure: TENODESIS, BICEPS, OPEN;  Surgeon: Isidro Paredes MD;  Location: [facility] OR;  Service: Orthopedics;  Laterality: Right;    TONSILLECTOMY         Family History   Problem Relation Name Age of Onset    Breast cancer Paternal Grandmother      Colon cancer Neg Hx      Ovarian cancer Neg Hx           Current Outpatient Medications:     BIFIDOBACTERIUM ANIMALIS ORAL, Take 1 tablet by mouth once daily., Disp: , Rfl:     calcium/folic ac/multivit-min (MULTIVITAMIN-MIN-CALCIUM-FA ORAL), Take 1 capsule by mouth once daily., Disp: , Rfl:     cetirizine (ZYRTEC) 10 MG tablet, Take 10 mg by mouth once daily., Disp: , Rfl:     estradioL (ESTRACE) 1 MG tablet, Take 1 mg by mouth once daily., Disp: , Rfl:     ferrous sulfate (FEOSOL) Tab tablet, Take 1 tablet by mouth daily with breakfast., Disp: , Rfl:     hydroxychloroquine (PLAQUENIL) 200 mg tablet, , Disp: , Rfl: 0    hydrOXYzine pamoate (VISTARIL) 25 MG Cap, take 1 capsule by mouth four times a day, Disp: , Rfl: 0    levocetirizine (XYZAL) 5 MG tablet, Take 5 mg by mouth nightly as needed for Allergies., Disp: , Rfl: 0    levothyroxine (SYNTHROID) 75 MCG tablet, Take 75 mcg by mouth once daily., Disp: , Rfl: 0    meloxicam (MOBIC) 7.5 MG tablet, Take 1 tablet (7.5 mg total) by mouth once daily., Disp: 30 tablet, Rfl: 3    omeprazole (PRILOSEC) 40 MG capsule, Take 40 mg by mouth every morning., Disp: , Rfl:     oxyCODONE-acetaminophen (PERCOCET)  mg per tablet, Take 1 tablet by mouth every 6 (six) hours as needed for Pain., Disp: 28 tablet, Rfl: 0    prednisoLONE acetate (PRED FORTE) 1 % DrpS, , Disp: , Rfl: 0    progesterone (PROMETRIUM) 200 MG capsule, Take 200 mg by mouth every evening., Disp: , Rfl:     promethazine (PHENERGAN) 25 MG tablet, Take 1 tablet (25 mg total) by mouth every  "6 (six) hours as needed for Nausea., Disp: 20 tablet, Rfl: 0    traMADoL (ULTRAM) 50 mg tablet, Take 1 tablet (50 mg total) by mouth every 6 (six) hours as needed for Pain., Disp: 20 tablet, Rfl: 0    turmeric-ging-olive-oreg-capry 100 mg-150 mg- 50 mg-150 mg Cap, Take 1 tablet by mouth once daily., Disp: , Rfl:     vitamin D (VITAMIN D3) 1000 units Tab, Take 1,000 Units by mouth once daily., Disp: , Rfl:     zinc sulfate (ZINC-15 ORAL), Take by mouth., Disp: , Rfl:     aspirin 81 MG Chew, Chew and swallow 1 tablet (81 mg total) by mouth 2 (two) times a day for 14 days, Disp: 28 tablet, Rfl: 0    triamcinolone acetonide 0.1% (KENALOG) 0.1 % cream, Apply topically 2 (two) times daily., Disp: 45 g, Rfl: 2    Review of patient's allergies indicates:  No Known Allergies       REVIEW OF SYSTEMS:  Constitution: Negative. Negative for chills, fever and night sweats.   HENT: Negative for congestion and headaches.    Eyes: Negative for blurred vision, left vision loss and right vision loss.   Cardiovascular: Negative for chest pain and syncope.   Respiratory: Negative for cough and shortness of breath.    Endocrine: Negative for polydipsia, polyphagia and polyuria.   Hematologic/Lymphatic: Negative for bleeding problem. Does not bruise/bleed easily.   Skin: Negative for dry skin, itching and rash.   Musculoskeletal: Negative for falls.  Positive for right shoulder pain and muscle weakness.   Gastrointestinal: Negative for abdominal pain and bowel incontinence.   Genitourinary: Negative for bladder incontinence and nocturia.   Neurological: Negative for disturbances in coordination, loss of balance and seizures.   Psychiatric/Behavioral: Negative for depression. The patient does not have insomnia.    Allergic/Immunologic: Negative for hives and persistent infections.      PHYSICAL EXAMINATION:  Vitals:  /65   Pulse 63   Ht 5' 3" (1.6 m)   Wt 56.8 kg (125 lb 4.6 oz)   LMP 11/28/2019   BMI 22.19 kg/m²    General: The " patient is alert and oriented x 3.  Mood is pleasant.  Observation of ears, eyes and nose reveal no gross abnormalities.  No labored breathing observed.  Gait is coordinated. Patient can toe walk and heel walk without difficulty.      RIGHT SHOULDER / UPPER EXTREMITY EXAM    OBSERVATION:     Swelling  none  Deformity  none   Discoloration  none   Scapular winging none   Scars   none  Atrophy  none    TENDERNESS / CREPITUS (T/C):          T/C      T/C   Clavicle   -/-  SUPRAspinatus    -/-     AC Jt.    -/-  INFRAspinatus  -/-    SC Jt.    -/-  Deltoid    -/-      G. Tuberosity  -/-  LH BICEP groove  -/-   Acromion:  -/-  Midline Neck   -/-     Scapular Spine -/-  Trapezium   -/-   SMA Scapula  -/-  GH jt. line - post  -/-     Scapulothoracic  -/-         ROM: (* = with pain)  Left shoulder   Right shoulder        AROM (PROM)   AROM (PROM)   FE    170° (175°)     150° (155°)     ER at 0°    60°  (65°)    50°  (55°)   ER at 90° ABD  90°  (90°)    90°  (90°)   IR at 90°  ABD   NA  (40°)     NA  (40°)      IR (spine level)   T10     L2    STRENGTH: (* = with pain) Left shoulder   Right shoulder   SCAPTION   5/5    4+/5    IR    5/5    4/5   ER    5/5    4+/5   BICEPS   5/5    4+/5   Deltoid    5/5    4+/5     SIGNS:  Painful side       NEER   -    OGILS  neg    MEDINA   -    SPEEDS  neg     DROP ARM   -   BELLY PRESS neg   Superior escape none    LIFT-OFF  neg   X-Body ADD    neg    MOVING VALGUS neg        STABILITY TESTING    Left shoulder   Right shoulder    Translation     Anterior  up face     up face    Posterior  up face    up face    Sulcus   < 10mm    < 10 mm     Signs   Apprehension   neg      neg       Relocation   no change     no change      Jerk test  neg     neg    EXTREMITY NEURO-VASCULAR EXAM:    Sensation grossly intact to light touch all dermatomal regions.    DTR 2+ Biceps, Triceps, BR and Negative Kwames sign   Grossly intact motor function at Elbow, Wrist and Hand   Distal pulses  radial and ulnar 2+, brisk cap refill, symmetric.      NECK:  Painless FROM and spinous processes non-tender. Negative Spurlings sign.      OTHER FINDINGS:      Imaging:  No imaging done at this visit.           ASSESSMENT:   Right shoulder pain:  1. S/P rotator cuff repair        PLAN:      1. Continue PT    2. F/u in 2 months.     All questions were answered, patient will contact us for questions or concerns in the interim.

## 2025-01-31 ENCOUNTER — CLINICAL SUPPORT (OUTPATIENT)
Dept: REHABILITATION | Facility: HOSPITAL | Age: 50
End: 2025-01-31
Payer: COMMERCIAL

## 2025-01-31 DIAGNOSIS — R29.3 POSTURE ABNORMALITY: Primary | ICD-10-CM

## 2025-01-31 DIAGNOSIS — R29.898 UPPER EXTREMITY WEAKNESS: ICD-10-CM

## 2025-01-31 DIAGNOSIS — M25.611 DECREASED RANGE OF MOTION OF RIGHT SHOULDER: ICD-10-CM

## 2025-01-31 PROCEDURE — 97530 THERAPEUTIC ACTIVITIES: CPT

## 2025-01-31 PROCEDURE — 97112 NEUROMUSCULAR REEDUCATION: CPT

## 2025-01-31 PROCEDURE — 97110 THERAPEUTIC EXERCISES: CPT

## 2025-01-31 NOTE — PROGRESS NOTES
[facility]   Physical Therapy Treatment Note     Name: Skye Marte  [facility] Number: [id number]    Therapy Diagnosis:   Encounter Diagnoses   Name Primary?    Posture abnormality Yes    Decreased range of motion of right shoulder     Upper extremity weakness        Physician: Isidro Paredes MD    Visit Date: 1/31/2025  Physician Orders: PT Eval and Treat  Medical Diagnosis from Referral:   M19.011 (ICD-10-CM) - Arthritis of right acromioclavicular joint   M75.21 (ICD-10-CM) - Biceps tendinitis of right upper extremity   M75.101 (ICD-10-CM) - Tear of right rotator cuff, unspecified tear extent, unspecified whether traumatic   Evaluation Date: 9/3/2024  Authorization Period Expiration: 12/31/2024  Plan of Care Expiration: 02/21/2025   Progress Note Due: 12/31/2024  Visit # / Visits authorized: 6/20  FOTO 3/3 and discharged     Time In: 1100 am  Time Out: 1200 pm  Total Billable Time: 60 minutes     Procedure by Reggie: 08/28/2024  1. Right shoulder arthroscopic rotator cuff repair.   2. Right shoulder open subpectoral biceps tenodesis  3. Right shoulder arthroscopic distal clavicle excision  4. Right shoulder arthroscopic subacromial decompression.   5. Right shoulder arthroscopic debridement labrum  Post-Op Precautions: Follow medium size rotator cuff repair      Precautions: Standard and post-op    SUBJECTIVE     Pt reports: shoulder feels good, no complaints    She was compliant with home exercise program.  Response to previous treatment: no adverse effects  Functional change: improved sling management    Pain: 0/10 during AROM flexion  Location: right shoulder      OBJECTIVE     DOS: 8/28/24  POD: 5 months and 3 days as of 1/31    Objective Measures updated at progress report unless specified.     Shoulder Passive Range of Motion: 12/20    Right Left   Flexion    160 deg 180 deg   ER at 0    65 deg  85 deg   ER at 45    87 deg  100+ deg   IR at 45    30 deg 48 deg  "    Active Range of Motion: 11/29  Shoulder Right Left   Flexion 130 170   Abduction 90 145   Functional ER Back of head C7   Functional IR Lateral hip T8     Upper Extremity Strength: 11/29   (R) UE (L) UE   Shoulder flexion: 4-/5 4+/5   Shoulder Abduction: 4-/5 4+/5   Shoulder ER NT 4/5   Shoulder IR NT 5/5   Lower Trap 3-/5 4/5   Middle Trap 3-/5 4/5   Serratus anterior 4-/5 4+/5   Rhomboids 4/5 5/5        Treatment     Skye received the treatments listed below:      THERAPEUTIC EXERCISES to develop strength, endurance, ROM, flexibility, posture, and core stabilization for 15 minutes including:    Flexion walkaway stretch 10 x 10"   Lat stretch behind mat 10 x 10"   Supine AA wand flexion to tolerance 3 x 10   Supine wand ER stretch @0deg 10 x 10"   Sleeper stretch 12 x 10"     NP Today:   Lat robot; 20x   Seated abd prop + MHP 7 min  Picnic ER to flexion; 30 x   S/L post cap stretch 3 x 30"     MANUAL THERAPY TECHNIQUES including Joint mobilizations and Soft tissue Mobilization were applied to R shoulder for 00 minutes.    Assessment of shldr ROM  Oscillations for pain guarding (gr I-II)  Inferior GH mob (gr III-IV)  Posterior GH mobs (III)  PROM within protocol limits  Scap pinning into OH flexion     NEUROMUSCULAR RE-EDUCATION ACTIVITIES to improve Balance, Coordination, Kinesthetic, Sense, Proprioception, and Posture for 30 minutes.  The following were included:   Resisted ER @neutral -> 90deg flx -> 90/90 ER GTB 3 x 10  D2 flx GTB 3 x 8 x 3"  Prone T 1# 3 x 10 x 3"   Prone Y 1# 3 x 10  S/L ER to neutral 3# 3 x 10 x 5"     NP Today:  Standing T YTB 3 x 10  Standing D2; YTB; 3 x 12  ER 0-90 deg GTB; 3 x 10  Seated ER 90 deg flex; 0#; 3 x 10   90deg ER OTB 3 x burn  90deg IR GTB 3 x burn  FIR wall slides x 25, cueing for avoidance of protraction/ant shearing      THERAPEUTIC ACTIVITIES to improve dynamic and functional performance for 15 minutes including:  UBE lvl 7 x 4'/4' for UE/cardiovascular " endurance   carries 5# KB bottoms up x 4 turf laps  OH carries 5#KB bottoms up x 4 turf laps    NP Today:  Uni wall slide 2# 3 x 12  1/2 kneeling SA landmines 15#bar 3 x 10  Handcuff to OH; GTB 3 x 10  Standing scaption in front of mirror 3# x 40hold      Patient Education and Home Exercises     Home Exercises Provided and Patient Education Provided     Education provided:   - continue HEP    Written Home Exercises Provided: Patient instructed to cont prior HEP. Exercises were reviewed and Skye was able to demonstrate them prior to the end of the session.  Skye demonstrated good understanding of the education provided. See EMR under Patient Instructions for exercises provided during therapy sessions    ASSESSMENT     Skye did well today with decreased cues needed for D2 flexion. She was very fatigued by added stability component of 's carry and OH carry. She will benefit continued strengthening as able.     Skye is progressing well towards her goals.   Pt prognosis is Good.     Pt will continue to benefit from skilled outpatient physical therapy to address the deficits listed in the problem list box on initial evaluation, provide pt/family education and to maximize pt's level of independence in the home and community environment.     Pt's spiritual, cultural and educational needs considered and pt agreeable to plan of care and goals.     Anticipated barriers to physical therapy: none    Goals:   Short Term Goals: 6 weeks  1. Pt will be compliant with HEP 50% of prescribed amount. MET  2. The pt to demo improvement in R shoulder PROM to by 80% of the L (MET  3.  The pt to demo tolerance to being out of the sling for 24 hours with pain <2/10 MET     Long Term Goals: 24 weeks   Pt will be compliant with % of prescribed amount.  (Progressing, not met)  Pt will score 20% improvement on FOTO Shoulder Mobility  (Progressing, not met)  The pt to improvement in AROM of R shoulder within 80% of L  shoulder to improve tolerance to OH movement  (Progressing, not met)  The pt to  Demo at least 4+/5 of RTC muscle testing to demo improvement in tolerance to activity (Progressing, not met)  The pt to tolerate lifting 10# overhead to improve tolerance to ADLs and work related activities  (Progressing, not met)  The pt will report full participation in ADLs and IADLs without restrictions related to R Shoulder.   (Progressing, not met)    PLAN     Progress PROM per protocol    Lelo Everett, PT, DPT, SCS

## 2025-02-04 ENCOUNTER — CLINICAL SUPPORT (OUTPATIENT)
Dept: REHABILITATION | Facility: HOSPITAL | Age: 50
End: 2025-02-04
Payer: COMMERCIAL

## 2025-02-04 DIAGNOSIS — R29.898 UPPER EXTREMITY WEAKNESS: ICD-10-CM

## 2025-02-04 DIAGNOSIS — M25.611 DECREASED RANGE OF MOTION OF RIGHT SHOULDER: ICD-10-CM

## 2025-02-04 DIAGNOSIS — R29.3 POSTURE ABNORMALITY: Primary | ICD-10-CM

## 2025-02-04 PROCEDURE — 97530 THERAPEUTIC ACTIVITIES: CPT | Mod: CQ

## 2025-02-04 PROCEDURE — 97110 THERAPEUTIC EXERCISES: CPT | Mod: CQ

## 2025-02-04 PROCEDURE — 97112 NEUROMUSCULAR REEDUCATION: CPT | Mod: CQ

## 2025-02-04 PROCEDURE — 97140 MANUAL THERAPY 1/> REGIONS: CPT | Mod: CQ

## 2025-02-04 NOTE — PROGRESS NOTES
[facility]   Physical Therapy Treatment Note     Name: Skye Marte  Clinic Number: [id number]    Therapy Diagnosis:   No diagnosis found.    Physician: Isidro Paredes MD    Visit Date: 2/4/2025  Physician Orders: PT Eval and Treat  Medical Diagnosis from Referral:   M19.011 (ICD-10-CM) - Arthritis of right acromioclavicular joint   M75.21 (ICD-10-CM) - Biceps tendinitis of right upper extremity   M75.101 (ICD-10-CM) - Tear of right rotator cuff, unspecified tear extent, unspecified whether traumatic   Evaluation Date: 9/3/2024  Authorization Period Expiration: 12/31/2024  Plan of Care Expiration: 02/21/2025   Progress Note Due: 12/31/2024  Visit # / Visits authorized: 7/20  FOTO 3/3 and discharged     Time In: 1702  Time Out: 1803  Total Billable Time: 59 minutes     Procedure by Reggie: 08/28/2024  1. Right shoulder arthroscopic rotator cuff repair.   2. Right shoulder open subpectoral biceps tenodesis  3. Right shoulder arthroscopic distal clavicle excision  4. Right shoulder arthroscopic subacromial decompression.   5. Right shoulder arthroscopic debridement labrum  Post-Op Precautions: Follow medium size rotator cuff repair      Precautions: Standard and post-op    SUBJECTIVE     Pt reports: shoulder feels good, no complaints.     She was compliant with home exercise program.  Response to previous treatment: no adverse effects  Functional change: improved sling management    Pain: 0/10 during AROM flexion  Location: right shoulder      OBJECTIVE     DOS: 8/28/24  POD: 5 months and 3 days as of 1/31    Objective Measures updated at progress report unless specified.     Shoulder Passive Range of Motion: 12/20    Right Left   Flexion    160 deg 180 deg   ER at 0    65 deg  85 deg   ER at 45    87 deg  100+ deg   IR at 45    30 deg 48 deg     Active Range of Motion: 11/29  Shoulder Right Left   Flexion 130 170   Abduction 90 145   Functional ER Back of head C7   Functional  "IR Lateral hip T8     Upper Extremity Strength: 11/29   (R) UE (L) UE   Shoulder flexion: 4-/5 4+/5   Shoulder Abduction: 4-/5 4+/5   Shoulder ER NT 4/5   Shoulder IR NT 5/5   Lower Trap 3-/5 4/5   Middle Trap 3-/5 4/5   Serratus anterior 4-/5 4+/5   Rhomboids 4/5 5/5        Treatment     Skye received the treatments listed below:      THERAPEUTIC EXERCISES to develop strength, endurance, ROM, flexibility, posture, and core stabilization for 14 minutes including:    Lat stretch behind mat 12 x 10"   Supine AA wand flexion to tolerance 3 x 10   Supine wand ER stretch @0deg 12 x 10"   Sleeper stretch 12 x 10"     NP Today:   Lat robot; 20x   Seated abd prop + MHP 7 min  Picnic ER to flexion; 30 x   S/L post cap stretch 3 x 30"   Flexion walkaway stretch 10 x 10"     MANUAL THERAPY TECHNIQUES including Joint mobilizations and Soft tissue Mobilization were applied to R shoulder for 08 minutes.    Assessment of shldr ROM  Oscillations for pain guarding (gr I-II)  Inferior GH mob (gr III-IV)  Posterior GH mobs (III)  PROM within protocol limits  Scap pinning into OH flexion     NEUROMUSCULAR RE-EDUCATION ACTIVITIES to improve Balance, Coordination, Kinesthetic, Sense, Proprioception, and Posture for 17 minutes.  The following were included:     S/L ER to neutral 4# 3 x 10 x 5"   D2 flx GTB 3 x 8 x 3"  Prone T 2# 4 x 6-8    NP Today:  Standing T YTB 3 x 10  Standing D2; YTB; 3 x 12  ER 0-90 deg GTB; 3 x 10  Seated ER 90 deg flex; 0#; 3 x 10   90deg ER OTB 3 x burn  90deg IR GTB 3 x burn  FIR wall slides x 25, cueing for avoidance of protraction/ant shearing  Prone Y 1# 3 x 10  Resisted ER @neutral -> 90deg flx -> 90/90 ER GTB 3 x 10    THERAPEUTIC ACTIVITIES to improve dynamic and functional performance for 20 minutes including:    UBE lvl 7 x 4'/4' for UE/cardiovascular endurance  EOT shallow push ups 4 x 10   carries -> OH press 5# KB bottoms up x 3 turf laps  Standing scaption in front of mirror 3# x " 40hold    NP Today:  Uni wall slide 2# 3 x 12  1/2 kneeling SA landmines 15#bar 3 x 10  Handcuff to OH; GTB 3 x 10      Patient Education and Home Exercises     Home Exercises Provided and Patient Education Provided     Education provided:   - continue HEP    Written Home Exercises Provided: Patient instructed to cont prior HEP. Exercises were reviewed and Skye was able to demonstrate them prior to the end of the session.  Skye demonstrated good understanding of the education provided. See EMR under Patient Instructions for exercises provided during therapy sessions    ASSESSMENT     Skye did well today. Good scap depression noted during end range D2 flexion. Min cueing during progressed prone T's for proper scap position. Fatigue at end of tx during scaption series. Will continue to progress as tolerated. No adverse effects reported p tx.     Skye is progressing well towards her goals.   Pt prognosis is Good.     Pt will continue to benefit from skilled outpatient physical therapy to address the deficits listed in the problem list box on initial evaluation, provide pt/family education and to maximize pt's level of independence in the home and community environment.     Pt's spiritual, cultural and educational needs considered and pt agreeable to plan of care and goals.     Anticipated barriers to physical therapy: none    Goals:   Short Term Goals: 6 weeks  1. Pt will be compliant with HEP 50% of prescribed amount. MET  2. The pt to demo improvement in R shoulder PROM to by 80% of the L (MET  3.  The pt to demo tolerance to being out of the sling for 24 hours with pain <2/10 MET     Long Term Goals: 24 weeks   Pt will be compliant with % of prescribed amount.  (Progressing, not met)  Pt will score 20% improvement on FOTO Shoulder Mobility  (Progressing, not met)  The pt to improvement in AROM of R shoulder within 80% of L shoulder to improve tolerance to OH movement  (Progressing, not met)  The pt to   Demo at least 4+/5 of RTC muscle testing to demo improvement in tolerance to activity (Progressing, not met)  The pt to tolerate lifting 10# overhead to improve tolerance to ADLs and work related activities  (Progressing, not met)  The pt will report full participation in ADLs and IADLs without restrictions related to R Shoulder.   (Progressing, not met)    PLAN     Progress PROM per protocol    Viridiana Haney, ALEXANDRA, DPT, SCS

## 2025-02-07 ENCOUNTER — CLINICAL SUPPORT (OUTPATIENT)
Dept: REHABILITATION | Facility: HOSPITAL | Age: 50
End: 2025-02-07
Payer: COMMERCIAL

## 2025-02-07 DIAGNOSIS — M25.611 DECREASED RANGE OF MOTION OF RIGHT SHOULDER: ICD-10-CM

## 2025-02-07 DIAGNOSIS — R29.898 UPPER EXTREMITY WEAKNESS: ICD-10-CM

## 2025-02-07 DIAGNOSIS — R29.3 POSTURE ABNORMALITY: Primary | ICD-10-CM

## 2025-02-07 PROCEDURE — 97110 THERAPEUTIC EXERCISES: CPT

## 2025-02-07 PROCEDURE — 97530 THERAPEUTIC ACTIVITIES: CPT

## 2025-02-07 PROCEDURE — 97112 NEUROMUSCULAR REEDUCATION: CPT

## 2025-02-07 NOTE — PROGRESS NOTES
[facility]   Physical Therapy Treatment Note     Name: Skye Marte  [facility] Number: [id number]    Therapy Diagnosis:   Encounter Diagnoses   Name Primary?    Posture abnormality Yes    Decreased range of motion of right shoulder     Upper extremity weakness        Physician: Isidro Paredes MD    Visit Date: 2/7/2025  Physician Orders: PT Eval and Treat  Medical Diagnosis from Referral:   M19.011 (ICD-10-CM) - Arthritis of right acromioclavicular joint   M75.21 (ICD-10-CM) - Biceps tendinitis of right upper extremity   M75.101 (ICD-10-CM) - Tear of right rotator cuff, unspecified tear extent, unspecified whether traumatic   Evaluation Date: 9/3/2024  Authorization Period Expiration: 12/31/2024  Plan of Care Expiration: 02/21/2025   Progress Note Due: 12/31/2024  Visit # / Visits authorized: 8/20  FOTO 3/3 and discharged     Time In: 1702  Time Out: 1803  Total Billable Time: 59 minutes     Procedure by Reggie: 08/28/2024  1. Right shoulder arthroscopic rotator cuff repair.   2. Right shoulder open subpectoral biceps tenodesis  3. Right shoulder arthroscopic distal clavicle excision  4. Right shoulder arthroscopic subacromial decompression.   5. Right shoulder arthroscopic debridement labrum  Post-Op Precautions: Follow medium size rotator cuff repair      Precautions: Standard and post-op    SUBJECTIVE     Pt reports: feels a little sore after sleeping on her shoulder     She was compliant with home exercise program.  Response to previous treatment: no adverse effects  Functional change: improved sling management    Pain: 0/10 during AROM flexion  Location: right shoulder      OBJECTIVE     DOS: 8/28/24  POD: 5 months and 10 days as of 2/7    Objective Measures updated at progress report unless specified.     Shoulder Passive Range of Motion: 12/20    Right Left   Flexion    160 deg 180 deg   ER at 0    65 deg  85 deg   ER at 45    87 deg  100+ deg   IR at 45    30 deg 48  "deg     Active Range of Motion: 11/29  Shoulder Right Left   Flexion 130 170   Abduction 90 145   Functional ER Back of head C7   Functional IR Lateral hip T8     Upper Extremity Strength: 11/29   (R) UE (L) UE   Shoulder flexion: 4-/5 4+/5   Shoulder Abduction: 4-/5 4+/5   Shoulder ER NT 4/5   Shoulder IR NT 5/5   Lower Trap 3-/5 4/5   Middle Trap 3-/5 4/5   Serratus anterior 4-/5 4+/5   Rhomboids 4/5 5/5        Treatment     Skye received the treatments listed below:      THERAPEUTIC EXERCISES to develop strength, endurance, ROM, flexibility, posture, and core stabilization for 15 minutes including:    Lat stretch behind mat 12 x 10"   Supine AA wand flexion to tolerance 3 x 10   Supine wand ER stretch @0deg 12 x 10"   Sleeper stretch 12 x 10"     NP Today:   Lat robot; 20x   Seated abd prop + MHP 7 min  Picnic ER to flexion; 30 x   S/L post cap stretch 3 x 30"   Flexion walkaway stretch 10 x 10"     MANUAL THERAPY TECHNIQUES including Joint mobilizations and Soft tissue Mobilization were applied to R shoulder for 00 minutes.    Assessment of shldr ROM  Oscillations for pain guarding (gr I-II)  Inferior GH mob (gr III-IV)  Posterior GH mobs (III)  PROM within protocol limits  Scap pinning into OH flexion     NEUROMUSCULAR RE-EDUCATION ACTIVITIES to improve Balance, Coordination, Kinesthetic, Sense, Proprioception, and Posture for 30 minutes.  The following were included:   ER 0-90 deg GTB; 3 x burn  ER/IR isotonic GTB 3 x 10   S/L ER to neutral 4# 3 x 10 x 5"   D2 flx GTB 3 x 10 x 3"  Prone T 2# 4 x 8- cues to avoid "swinging"     NP Today:  Standing T YTB 3 x 10  Seated ER 90 deg flex; 0#; 3 x 10   90deg ER OTB 3 x burn  90deg IR GTB 3 x burn  FIR wall slides x 25, cueing for avoidance of protraction/ant shearing  Prone Y 1# 3 x 10  Resisted ER @neutral -> 90deg flx -> 90/90 ER GTB 3 x 10    THERAPEUTIC ACTIVITIES to improve dynamic and functional performance for 15 minutes including:  Floor press (on table) 5# " ea; 3 x 10    carries -> OH press x 5; 5# KB bottoms up x 4 turf laps  Standing scaption in front of mirror 3# x 40hold    NP Today:  Uni wall slide 2# 3 x 12  Handcuff to OH; GTB 3 x 10  UBE lvl 7 x 4'/4' for UE/cardiovascular endurance  EOT shallow push ups 4 x 10  1/2 kneeling SA landmines 15#bar 3 x 10    Patient Education and Home Exercises     Home Exercises Provided and Patient Education Provided     Education provided:   - continue HEP    Written Home Exercises Provided: Patient instructed to cont prior HEP. Exercises were reviewed and Skye was able to demonstrate them prior to the end of the session.  Skye demonstrated good understanding of the education provided. See EMR under Patient Instructions for exercises provided during therapy sessions    ASSESSMENT     Skye is continuing to progress well in all areas with good control in loaded strengthening today. She will benefit continued strengthening in periscapular and rotator cuff strengthening.     Skye is progressing well towards her goals.   Pt prognosis is Good.     Pt will continue to benefit from skilled outpatient physical therapy to address the deficits listed in the problem list box on initial evaluation, provide pt/family education and to maximize pt's level of independence in the home and community environment.     Pt's spiritual, cultural and educational needs considered and pt agreeable to plan of care and goals.     Anticipated barriers to physical therapy: none    Goals:   Short Term Goals: 6 weeks  1. Pt will be compliant with HEP 50% of prescribed amount. MET  2. The pt to demo improvement in R shoulder PROM to by 80% of the L (MET  3.  The pt to demo tolerance to being out of the sling for 24 hours with pain <2/10 MET     Long Term Goals: 24 weeks   Pt will be compliant with % of prescribed amount.  (Progressing, not met)  Pt will score 20% improvement on FOTO Shoulder Mobility  (Progressing, not met)  The pt to  improvement in AROM of R shoulder within 80% of L shoulder to improve tolerance to OH movement  (Progressing, not met)  The pt to  Demo at least 4+/5 of RTC muscle testing to demo improvement in tolerance to activity (Progressing, not met)  The pt to tolerate lifting 10# overhead to improve tolerance to ADLs and work related activities  (Progressing, not met)  The pt will report full participation in ADLs and IADLs without restrictions related to R Shoulder.   (Progressing, not met)    PLAN     Progress PROM per protocol    Lelo Everett, PT, DPT, SCS

## 2025-02-11 ENCOUNTER — PATIENT MESSAGE (OUTPATIENT)
Dept: SPORTS MEDICINE | Facility: CLINIC | Age: 50
End: 2025-02-11
Payer: COMMERCIAL

## 2025-02-13 ENCOUNTER — HOSPITAL ENCOUNTER (OUTPATIENT)
Dept: RADIOLOGY | Facility: OTHER | Age: 50
Discharge: HOME | End: 2025-02-13
Attending: OBSTETRICS & GYNECOLOGY
Payer: COMMERCIAL

## 2025-02-13 DIAGNOSIS — Z12.31 ENCOUNTER FOR SCREENING MAMMOGRAM FOR BREAST CANCER: ICD-10-CM

## 2025-02-13 PROCEDURE — 77063 BREAST TOMOSYNTHESIS BI: CPT | Mod: 26,,, | Performed by: RADIOLOGY

## 2025-02-13 PROCEDURE — 77063 BREAST TOMOSYNTHESIS BI: CPT | Mod: TC

## 2025-02-13 PROCEDURE — 77067 SCR MAMMO BI INCL CAD: CPT | Mod: 26,,, | Performed by: RADIOLOGY

## 2025-02-14 ENCOUNTER — CLINICAL SUPPORT (OUTPATIENT)
Dept: REHABILITATION | Facility: HOSPITAL | Age: 50
End: 2025-02-14
Payer: COMMERCIAL

## 2025-02-14 DIAGNOSIS — R29.898 UPPER EXTREMITY WEAKNESS: ICD-10-CM

## 2025-02-14 DIAGNOSIS — M25.611 DECREASED RANGE OF MOTION OF RIGHT SHOULDER: ICD-10-CM

## 2025-02-14 DIAGNOSIS — R29.3 POSTURE ABNORMALITY: Primary | ICD-10-CM

## 2025-02-14 PROCEDURE — 97112 NEUROMUSCULAR REEDUCATION: CPT

## 2025-02-14 PROCEDURE — 97530 THERAPEUTIC ACTIVITIES: CPT

## 2025-02-14 PROCEDURE — 97110 THERAPEUTIC EXERCISES: CPT

## 2025-02-14 NOTE — PROGRESS NOTES
[facility]   Physical Therapy Treatment Note     Name: Skye Marte  [facility] Number: [id number]    Therapy Diagnosis:   Encounter Diagnoses   Name Primary?    Posture abnormality Yes    Decreased range of motion of right shoulder     Upper extremity weakness        Physician: Isidro Paredes MD    Visit Date: 2/14/2025  Physician Orders: PT Eval and Treat  Medical Diagnosis from Referral:   M19.011 (ICD-10-CM) - Arthritis of right acromioclavicular joint   M75.21 (ICD-10-CM) - Biceps tendinitis of right upper extremity   M75.101 (ICD-10-CM) - Tear of right rotator cuff, unspecified tear extent, unspecified whether traumatic   Evaluation Date: 9/3/2024  Authorization Period Expiration: 12/31/2024  Plan of Care Expiration: 02/21/2025   Progress Note Due: 12/31/2024  Visit # / Visits authorized: 9/20  FOTO 3/3 and discharged     Time In: 1100 am  Time Out: 1200 pm  Total Billable Time: 60 minutes     Procedure by Reggie: 08/28/2024  1. Right shoulder arthroscopic rotator cuff repair.   2. Right shoulder open subpectoral biceps tenodesis  3. Right shoulder arthroscopic distal clavicle excision  4. Right shoulder arthroscopic subacromial decompression.   5. Right shoulder arthroscopic debridement labrum  Post-Op Precautions: Follow medium size rotator cuff repair      Precautions: Standard and post-op    SUBJECTIVE     Pt reports: no complaints     She was compliant with home exercise program.  Response to previous treatment: no adverse effects  Functional change: improved sling management    Pain: 0/10 during AROM flexion  Location: right shoulder      OBJECTIVE     DOS: 8/28/24  POD: 5 months and 17 days as of 2/14    Objective Measures updated at progress report unless specified.     Shoulder Passive Range of Motion: 12/20    Right Left   Flexion    160 deg 180 deg   ER at 0    65 deg  85 deg   ER at 45    87 deg  100+ deg   IR at 45    30 deg 48 deg     Active Range of Motion:  "11/29  Shoulder Right Left   Flexion 130 170   Abduction 90 145   Functional ER Back of head C7   Functional IR Lateral hip T8     Upper Extremity Strength: 11/29   (R) UE (L) UE   Shoulder flexion: 4-/5 4+/5   Shoulder Abduction: 4-/5 4+/5   Shoulder ER NT 4/5   Shoulder IR NT 5/5   Lower Trap 3-/5 4/5   Middle Trap 3-/5 4/5   Serratus anterior 4-/5 4+/5   Rhomboids 4/5 5/5        Treatment     Skye received the treatments listed below:      THERAPEUTIC EXERCISES to develop strength, endurance, ROM, flexibility, posture, and core stabilization for 15 minutes including:    Lat stretch behind mat 12 x 10"   Supine AA wand flexion to tolerance 3 x 10   Supine wand ER stretch @0deg 12 x 10"   Sleeper stretch 12 x 10"     NP Today:   Lat robot; 20x   Seated abd prop + MHP 7 min  Picnic ER to flexion; 30 x   S/L post cap stretch 3 x 30"   Flexion walkaway stretch 10 x 10"     MANUAL THERAPY TECHNIQUES including Joint mobilizations and Soft tissue Mobilization were applied to R shoulder for 00 minutes.    Assessment of shldr ROM  Oscillations for pain guarding (gr I-II)  Inferior GH mob (gr III-IV)  Posterior GH mobs (III)  PROM within protocol limits  Scap pinning into OH flexion     NEUROMUSCULAR RE-EDUCATION ACTIVITIES to improve Balance, Coordination, Kinesthetic, Sense, Proprioception, and Posture for 25 minutes.  The following were included:   Prone high row to ER;0# 3 x 10   ER/IR isotonic G/BTB 3 x 10   Wall slide YTB 3 x 12  S/L ER to neutral 4# 3 x 10 x 5"   D2 flx GTB 3 x 10 x 3"      NP Today:  Standing T YTB 3 x 10  Seated ER 90 deg flex; 0#; 3 x 10   90deg ER OTB 3 x burn  90deg IR GTB 3 x burn  FIR wall slides x 25, cueing for avoidance of protraction/ant shearing  Prone Y 1# 3 x 10  ER 0-90 deg GTB; 3 x burn  Prone T 2# 4 x 8- cues to avoid "swinging"   Resisted ER @neutral -> 90deg flx -> 90/90 ER GTB 3 x 10    THERAPEUTIC ACTIVITIES to improve dynamic and functional performance for 20 minutes " including:  Floor press (on table) 5# ea; 3 x 10    carries -> OH press x 5; 5# KB bottoms up x 4 turf laps  Handcuff to OH; GTB 3 x 10    NP Today:  Uni wall slide 2# 3 x 12  UBE lvl 7 x 4'/4' for UE/cardiovascular endurance  EOT shallow push ups 4 x 10  1/2 kneeling SA landmines 15#bar 3 x 10  Standing scaption in front of mirror 3# x 40hold    Patient Education and Home Exercises     Home Exercises Provided and Patient Education Provided     Education provided:   - continue HEP    Written Home Exercises Provided: Patient instructed to cont prior HEP. Exercises were reviewed and Skye was able to demonstrate them prior to the end of the session.  Skye demonstrated good understanding of the education provided. See EMR under Patient Instructions for exercises provided during therapy sessions    ASSESSMENT     Skye continues to progress well in all periscapular and RTC interventions. She struggled with prone high row to external rotation with for maintaining glenohumeral spin but improved with repetitions.     Skye is progressing well towards her goals.   Pt prognosis is Good.     Pt will continue to benefit from skilled outpatient physical therapy to address the deficits listed in the problem list box on initial evaluation, provide pt/family education and to maximize pt's level of independence in the home and community environment.     Pt's spiritual, cultural and educational needs considered and pt agreeable to plan of care and goals.     Anticipated barriers to physical therapy: none    Goals:   Short Term Goals: 6 weeks  1. Pt will be compliant with HEP 50% of prescribed amount. MET  2. The pt to demo improvement in R shoulder PROM to by 80% of the L (MET  3.  The pt to demo tolerance to being out of the sling for 24 hours with pain <2/10 MET     Long Term Goals: 24 weeks   Pt will be compliant with % of prescribed amount.  (Progressing, not met)  Pt will score 20% improvement on FOTO Shoulder  Mobility  (Progressing, not met)  The pt to improvement in AROM of R shoulder within 80% of L shoulder to improve tolerance to OH movement  (Progressing, not met)  The pt to  Demo at least 4+/5 of RTC muscle testing to demo improvement in tolerance to activity (Progressing, not met)  The pt to tolerate lifting 10# overhead to improve tolerance to ADLs and work related activities  (Progressing, not met)  The pt will report full participation in ADLs and IADLs without restrictions related to R Shoulder.   (Progressing, not met)    PLAN     Progress PROM per protocol    Lelo Everett, PT, DPT, SCS

## 2025-02-21 ENCOUNTER — CLINICAL SUPPORT (OUTPATIENT)
Dept: REHABILITATION | Facility: HOSPITAL | Age: 50
End: 2025-02-21
Payer: COMMERCIAL

## 2025-02-21 DIAGNOSIS — R29.898 UPPER EXTREMITY WEAKNESS: ICD-10-CM

## 2025-02-21 DIAGNOSIS — M25.611 DECREASED RANGE OF MOTION OF RIGHT SHOULDER: ICD-10-CM

## 2025-02-21 DIAGNOSIS — R29.3 POSTURE ABNORMALITY: Primary | ICD-10-CM

## 2025-02-21 PROCEDURE — 97530 THERAPEUTIC ACTIVITIES: CPT

## 2025-02-21 PROCEDURE — 97112 NEUROMUSCULAR REEDUCATION: CPT

## 2025-02-21 NOTE — PROGRESS NOTES
[facility]   Physical Therapy Treatment Note     Name: Skye Marte  [facility] Number: [id number]    Therapy Diagnosis:   Encounter Diagnoses   Name Primary?    Posture abnormality Yes    Decreased range of motion of right shoulder     Upper extremity weakness        Physician: Isidro Paredes MD    Visit Date: 2/21/2025  Physician Orders: PT Eval and Treat  Medical Diagnosis from Referral:   M19.011 (ICD-10-CM) - Arthritis of right acromioclavicular joint   M75.21 (ICD-10-CM) - Biceps tendinitis of right upper extremity   M75.101 (ICD-10-CM) - Tear of right rotator cuff, unspecified tear extent, unspecified whether traumatic   Evaluation Date: 9/3/2024  Authorization Period Expiration: 12/31/2024  Plan of Care Expiration: 02/21/2025 to 04/28/205  Progress Note Due: 03/21/2025  Visit # / Visits authorized: 10/20  FOTO 3/3 and discharged     Time In: 1100 am  Time Out: 1200 pm  Total Billable Time: 60 minutes     Procedure by Reggie: 08/28/2024  1. Right shoulder arthroscopic rotator cuff repair.   2. Right shoulder open subpectoral biceps tenodesis  3. Right shoulder arthroscopic distal clavicle excision  4. Right shoulder arthroscopic subacromial decompression.   5. Right shoulder arthroscopic debridement labrum  Post-Op Precautions: Follow medium size rotator cuff repair      Precautions: Standard and post-op    SUBJECTIVE     Pt reports: shoulder has been doing great    She was compliant with home exercise program.  Response to previous treatment: no adverse effects  Functional change: improved sling management    Pain: 0/10 during AROM flexion  Location: right shoulder      OBJECTIVE     DOS: 8/28/24  POD: 5 months and 24 days as of 2/21    Objective Measures updated at progress report unless specified.     Shoulder Passive Range of Motion:     Right Left   Flexion    180 deg 180 deg   ER at 0    80 deg  85 deg   ER at 45    90 deg  100+ deg   IR at 45    45 deg 48 deg  "    Active Range of Motion:   Shoulder Right Left   Flexion 175 175   Abduction 170 170   Functional ER C7 C7   Functional IR L3 T10     Upper Extremity Strength:    (R) UE (L) UE   Shoulder flexion: 5/5 5/5   Shoulder Abduction: 5/5 5/5   Shoulder ER 4+/5 5/5   Shoulder IR 4-/5 5/5   Lower Trap 4-/5 4/5   Middle Trap 5/5 4/5   Serratus anterior 4-/5 4+/5   Rhomboids 5/5 5/5        Treatment     Skye received the treatments listed below:      THERAPEUTIC EXERCISES to develop strength, endurance, ROM, flexibility, posture, and core stabilization for 15 minutes including:  Lat stretch behind mat 12 x 10"   Sleeper stretch 12 x 10"   Reassessment    NP Today:   Flexion walkaway stretch 10 x 10"   Supine AA wand flexion to tolerance 3 x 10   Supine wand ER stretch @0deg 12 x 10"     MANUAL THERAPY TECHNIQUES including Joint mobilizations and Soft tissue Mobilization were applied to R shoulder for 00 minutes.    Assessment of shldr ROM  Oscillations for pain guarding (gr I-II)  Inferior GH mob (gr III-IV)  Posterior GH mobs (III)  PROM within protocol limits  Scap pinning into OH flexion     NEUROMUSCULAR RE-EDUCATION ACTIVITIES to improve Balance, Coordination, Kinesthetic, Sense, Proprioception, and Posture for 45 minutes.  The following were included:   Prone subscap 90-90 3 x 10 x 5"   Prone high row to ER;0# 3 x 10   ER/IR isotonic G/BTB 3 x 10   FIR iso holds 2 x 10 x 10" at various angles  ER 0-90 deg GTB; 3 x burn    NP Today:  Standing T YTB 3 x 10  Seated ER 90 deg flex; 0#; 3 x 10   90deg ER OTB 3 x burn  90deg IR GTB 3 x burn  FIR wall slides x 25, cueing for avoidance of protraction/ant shearing  Prone Y 1# 3 x 10    Prone T 2# 4 x 8- cues to avoid "swinging"   Resisted ER @neutral -> 90deg flx -> 90/90 ER GTB 3 x 10  Wall slide YTB 3 x 12  S/L ER to neutral 4# 3 x 10 x 5"   D2 flx GTB 3 x 10 x 3"    THERAPEUTIC ACTIVITIES to improve dynamic and functional performance for 00 minutes including:      NP " Today:  Uni wall slide 2# 3 x 12  UBE lvl 7 x 4'/4' for UE/cardiovascular endurance  EOT shallow push ups 4 x 10  1/2 kneeling SA landmines 15#bar 3 x 10  Standing scaption in front of mirror 3# x 40hold  Floor press (on table) 5# ea; 3 x 10    carries -> OH press x 5; 5# KB bottoms up x 4 turf laps  Handcuff to OH; GTB 3 x 10    Patient Education and Home Exercises     Home Exercises Provided and Patient Education Provided     Education provided:   - continue HEP    Written Home Exercises Provided: Patient instructed to cont prior HEP. Exercises were reviewed and Skye was able to demonstrate them prior to the end of the session.  Skye demonstrated good understanding of the education provided. See EMR under Patient Instructions for exercises provided during therapy sessions    ASSESSMENT     Skye is nearly 6 months s/p R RCR . She has progressed well and and demonstrates near full range of motion and strength. She continues to lack slight range and strength in Internal Rotation and will benefit continued emphasis on these areas in formal PT to return her for full functional independence.     Skye is progressing well towards her goals.   Pt prognosis is Good.     Pt will continue to benefit from skilled outpatient physical therapy to address the deficits listed in the problem list box on initial evaluation, provide pt/family education and to maximize pt's level of independence in the home and community environment.     Pt's spiritual, cultural and educational needs considered and pt agreeable to plan of care and goals.     Anticipated barriers to physical therapy: none    Goals:   Short Term Goals: 6 weeks  1. Pt will be compliant with HEP 50% of prescribed amount. MET  2. The pt to demo improvement in R shoulder PROM to by 80% of the L (MET  3.  The pt to demo tolerance to being out of the sling for 24 hours with pain <2/10 MET     Long Term Goals: 24 weeks   Pt will be compliant with % of  prescribed amount.  MET  Pt will score 20% improvement on FOTO Shoulder Mobility  MET  The pt to improvement in AROM of R shoulder within 80% of L shoulder to improve tolerance to OH movement  MET  The pt to  Demo at least 4+/5 of RTC muscle testing to demo improvement in tolerance to activity (Progressing, not met)  The pt to tolerate lifting 10# overhead to improve tolerance to ADLs and work related activities  (Progressing, not met)  The pt will report full participation in ADLs and IADLs without restrictions related to R Shoulder.   (Progressing, not met)    PLAN     Progress PROM per protocol    Lelo Everett, PT, DPT, SCS

## 2025-02-26 ENCOUNTER — PATIENT MESSAGE (OUTPATIENT)
Dept: SPORTS MEDICINE | Facility: CLINIC | Age: 50
End: 2025-02-26
Payer: COMMERCIAL

## 2025-02-27 ENCOUNTER — CLINICAL SUPPORT (OUTPATIENT)
Dept: REHABILITATION | Facility: HOSPITAL | Age: 50
End: 2025-02-27
Payer: COMMERCIAL

## 2025-02-27 DIAGNOSIS — R29.898 UPPER EXTREMITY WEAKNESS: ICD-10-CM

## 2025-02-27 DIAGNOSIS — M25.611 DECREASED RANGE OF MOTION OF RIGHT SHOULDER: ICD-10-CM

## 2025-02-27 DIAGNOSIS — R29.3 POSTURE ABNORMALITY: Primary | ICD-10-CM

## 2025-02-27 PROCEDURE — 97112 NEUROMUSCULAR REEDUCATION: CPT

## 2025-02-27 PROCEDURE — 97110 THERAPEUTIC EXERCISES: CPT

## 2025-02-27 PROCEDURE — 97530 THERAPEUTIC ACTIVITIES: CPT

## 2025-02-27 NOTE — PROGRESS NOTES
[facility]   Physical Therapy Treatment Note     Name: Skye Marte  [facility] Number: [id number]    Therapy Diagnosis:   Encounter Diagnoses   Name Primary?    Posture abnormality Yes    Decreased range of motion of right shoulder     Upper extremity weakness        Physician: Isidro Paredes MD    Visit Date: 2/27/2025  Physician Orders: PT Eval and Treat  Medical Diagnosis from Referral:   M19.011 (ICD-10-CM) - Arthritis of right acromioclavicular joint   M75.21 (ICD-10-CM) - Biceps tendinitis of right upper extremity   M75.101 (ICD-10-CM) - Tear of right rotator cuff, unspecified tear extent, unspecified whether traumatic   Evaluation Date: 9/3/2024  Authorization Period Expiration: 12/31/2024  Plan of Care Expiration: 02/21/2025 to 04/28/205  Progress Note Due: 03/21/2025  Visit # / Visits authorized: 10/20  FOTO 3/3 and discharged     Time In: 0505 pm  Time Out: 0600 pm  Total Billable Time: 60 minutes     Procedure by Reggie: 08/28/2024  1. Right shoulder arthroscopic rotator cuff repair.   2. Right shoulder open subpectoral biceps tenodesis  3. Right shoulder arthroscopic distal clavicle excision  4. Right shoulder arthroscopic subacromial decompression.   5. Right shoulder arthroscopic debridement labrum  Post-Op Precautions: Follow medium size rotator cuff repair      Precautions: Standard and post-op    SUBJECTIVE     Pt reports: was sore for 3-4 days after last session     She was compliant with home exercise program.  Response to previous treatment: no adverse effects  Functional change: improved sling management    Pain: 0/10 during AROM flexion  Location: right shoulder      OBJECTIVE     DOS: 8/28/24  POD: 5 months and 30 days as of 2/27     Objective Measures updated at progress report unless specified.     Shoulder Passive Range of Motion:     Right Left   Flexion    180 deg 180 deg   ER at 0    80 deg  85 deg   ER at 45    90 deg  100+ deg   IR at 45    45  "deg 48 deg     Active Range of Motion:   Shoulder Right Left   Flexion 175 175   Abduction 170 170   Functional ER C7 C7   Functional IR L3 T10     Upper Extremity Strength:    (R) UE (L) UE   Shoulder flexion: 5/5 5/5   Shoulder Abduction: 5/5 5/5   Shoulder ER 4+/5 5/5   Shoulder IR 4-/5 5/5   Lower Trap 4-/5 4/5   Middle Trap 5/5 4/5   Serratus anterior 4-/5 4+/5   Rhomboids 5/5 5/5        Treatment     Skye received the treatments listed below:      THERAPEUTIC EXERCISES to develop strength, endurance, ROM, flexibility, posture, and core stabilization for 10 minutes including:  Lat stretch behind mat 12 x 10"   Sleeper stretch 12 x 10"   Reassessment    NP Today:   Flexion walkaway stretch 10 x 10"   Supine AA wand flexion to tolerance 3 x 10   Supine wand ER stretch @0deg 12 x 10"     MANUAL THERAPY TECHNIQUES including Joint mobilizations and Soft tissue Mobilization were applied to R shoulder for 00 minutes.    Assessment of shldr ROM  Oscillations for pain guarding (gr I-II)  Inferior GH mob (gr III-IV)  Posterior GH mobs (III)  PROM within protocol limits  Scap pinning into OH flexion     NEUROMUSCULAR RE-EDUCATION ACTIVITIES to improve Balance, Coordination, Kinesthetic, Sense, Proprioception, and Posture for 30 minutes.  The following were included:   Prone high row to ER;0# 3 x 10   ER/IR isotonic G/BTB 3 x 10   Wall slide YTB 3 x 10  FIR iso holds 1 x 10 x 10" at various angles    NP Today:  FIR wall slides x 25, cueing for avoidance of protraction/ant shearing  Prone Y 1# 3 x 10  Prone T 2# 4 x 8- cues to avoid "swinging"   Resisted ER @neutral -> 90deg flx -> 90/90 ER GTB 3 x 10  S/L ER to neutral 4# 3 x 10 x 5"   D2 flx GTB 3 x 10 x 3"  ER 0-90 deg GTB; 3 x burn    THERAPEUTIC ACTIVITIES to improve dynamic and functional performance for 15 minutes including:  UBE lvl 7 x 4'/4' for UE/cardiovascular endurance   carries 5# and [employer] 15# x 4 turf laps      NP Today:  1/2 kneeling SA " landmines 15#bar 3 x 10  Handcuff to OH; GTB 3 x 10  Standing scaption in front of mirror 3# x 40hold    Patient Education and Home Exercises     Home Exercises Provided and Patient Education Provided     Education provided:   - continue HEP    Written Home Exercises Provided: Patient instructed to cont prior HEP. Exercises were reviewed and Skye was able to demonstrate them prior to the end of the session.  Skye demonstrated good understanding of the education provided. See EMR under Patient Instructions for exercises provided during therapy sessions    ASSESSMENT     Skye presented following recent excess soreness 2/2 increased load. She responded well to increased mobility and functional movements. She will benefit continued functional internal rotation mobility as well as add t/s mobility     Skye is progressing well towards her goals.   Pt prognosis is Good.     Pt will continue to benefit from skilled outpatient physical therapy to address the deficits listed in the problem list box on initial evaluation, provide pt/family education and to maximize pt's level of independence in the home and community environment.     Pt's spiritual, cultural and educational needs considered and pt agreeable to plan of care and goals.     Anticipated barriers to physical therapy: none    Goals:   Short Term Goals: 6 weeks  1. Pt will be compliant with HEP 50% of prescribed amount. MET  2. The pt to demo improvement in R shoulder PROM to by 80% of the L (MET  3.  The pt to demo tolerance to being out of the sling for 24 hours with pain <2/10 MET     Long Term Goals: 24 weeks   Pt will be compliant with % of prescribed amount.  MET  Pt will score 20% improvement on FOTO Shoulder Mobility  MET  The pt to improvement in AROM of R shoulder within 80% of L shoulder to improve tolerance to OH movement  MET  The pt to  Demo at least 4+/5 of RTC muscle testing to demo improvement in tolerance to activity (Progressing, not  met)  The pt to tolerate lifting 10# overhead to improve tolerance to ADLs and work related activities  (Progressing, not met)  The pt will report full participation in ADLs and IADLs without restrictions related to R Shoulder.   (Progressing, not met)    PLAN     Progress PROM per protocol    Lelo Everett, PT, DPT, SCS

## 2025-03-05 ENCOUNTER — CLINICAL SUPPORT (OUTPATIENT)
Dept: REHABILITATION | Facility: HOSPITAL | Age: 50
End: 2025-03-05
Payer: COMMERCIAL

## 2025-03-05 DIAGNOSIS — R29.898 UPPER EXTREMITY WEAKNESS: ICD-10-CM

## 2025-03-05 DIAGNOSIS — M25.611 DECREASED RANGE OF MOTION OF RIGHT SHOULDER: ICD-10-CM

## 2025-03-05 DIAGNOSIS — R29.3 POSTURE ABNORMALITY: Primary | ICD-10-CM

## 2025-03-05 PROCEDURE — 97112 NEUROMUSCULAR REEDUCATION: CPT | Mod: CQ

## 2025-03-05 PROCEDURE — 97530 THERAPEUTIC ACTIVITIES: CPT | Mod: CQ

## 2025-03-05 PROCEDURE — 97110 THERAPEUTIC EXERCISES: CPT | Mod: CQ

## 2025-03-05 PROCEDURE — 97140 MANUAL THERAPY 1/> REGIONS: CPT | Mod: CQ

## 2025-03-05 NOTE — PROGRESS NOTES
[facility]   Physical Therapy Treatment Note     Name: Skye Marte  [facility] Number: [id number]    Therapy Diagnosis:   Encounter Diagnoses   Name Primary?    Posture abnormality Yes    Decreased range of motion of right shoulder     Upper extremity weakness      Physician: Isidro Paredes MD    Visit Date: 3/5/2025  Physician Orders: PT Eval and Treat  Medical Diagnosis from Referral:   M19.011 (ICD-10-CM) - Arthritis of right acromioclavicular joint   M75.21 (ICD-10-CM) - Biceps tendinitis of right upper extremity   M75.101 (ICD-10-CM) - Tear of right rotator cuff, unspecified tear extent, unspecified whether traumatic   Evaluation Date: 9/3/2024  Authorization Period Expiration: 12/31/2024  Plan of Care Expiration: 02/21/2025 to 04/28/205  Progress Note Due: 03/21/2025  Visit # / Visits authorized: 12/20  FOTO 3/3 and discharged     Time In: 1233  Time Out: 1340  Total Billable Time: 65 minutes     Procedure by Reggie: 08/28/2024  1. Right shoulder arthroscopic rotator cuff repair.   2. Right shoulder open subpectoral biceps tenodesis  3. Right shoulder arthroscopic distal clavicle excision  4. Right shoulder arthroscopic subacromial decompression.   5. Right shoulder arthroscopic debridement labrum  Post-Op Precautions: Follow medium size rotator cuff repair      Precautions: Standard and post-op    SUBJECTIVE     Pt reports: no new complaints. Shoulder feels good today.     She was compliant with home exercise program.  Response to previous treatment: no adverse effects  Functional change: improved sling management    Pain: 0/10 during AROM flexion  Location: right shoulder      OBJECTIVE     DOS: 8/28/24  POD: 6 months and 5 days as of 3/4     Objective Measures updated at progress report unless specified.     Shoulder Passive Range of Motion:     Right Left   Flexion    180 deg 180 deg   ER at 0    80 deg  85 deg   ER at 45    90 deg  100+ deg   IR at 45    45 deg 48  "deg     Active Range of Motion:   Shoulder Right Left   Flexion 175 175   Abduction 170 170   Functional ER C7 C7   Functional IR L3 T10     Upper Extremity Strength:    (R) UE (L) UE   Shoulder flexion: 5/5 5/5   Shoulder Abduction: 5/5 5/5   Shoulder ER 4+/5 5/5   Shoulder IR 4-/5 5/5   Lower Trap 4-/5 4/5   Middle Trap 5/5 4/5   Serratus anterior 4-/5 4+/5   Rhomboids 5/5 5/5        Treatment     Skye received the treatments listed below:      THERAPEUTIC EXERCISES to develop strength, endurance, ROM, flexibility, posture, and core stabilization for 14 minutes including:    Flexion walkaway stretch 10 x 10"   Lat stretch behind mat 10 x 10"   Supine AA wand flexion to tolerance 3 x 10   Supine wand ER stretch @0deg 12 x 10" - np  Sleeper stretch 12 x 10"     MANUAL THERAPY TECHNIQUES including Joint mobilizations and Soft tissue Mobilization were applied to R shoulder for 08 minutes.    Assessment of shldr ROM  Oscillations for pain guarding (gr I-II)  Inferior GH mob (gr III-IV)  Posterior GH mobs (III)  PROM within protocol limits  Scap pinning into OH flexion     NEUROMUSCULAR RE-EDUCATION ACTIVITIES to improve Balance, Coordination, Kinesthetic, Sense, Proprioception, and Posture for 20 minutes.  The following were included:     S/L ER 4# 3 x 10 x 5"  SB prone T 1# 3 x 10  SB prone Y 1# 3 x 10  90/90 ER OTB 3 x 10  90/90 IR GTB 3 x 12    NP:  Prone high row to ER;0# 3 x 10   ER/IR isotonic G/BTB 3 x 10   Wall slide YTB 3 x 10  FIR iso holds 1 x 10 x 10" at various angles  FIR wall slides x 25, cueing for avoidance of protraction/ant shearing  Resisted ER @neutral -> 90deg flx -> 90/90 ER GTB 3 x 10  D2 flx GTB 3 x 10 x 3"  ER 0-90 deg GTB; 3 x burn    THERAPEUTIC ACTIVITIES to improve dynamic and functional performance for 23 minutes including:    Standing UBE lvl 7.5 x 4'/4' for UE/cardiovascular endurance    Circuit: 3 rounds   EOT bent over row 15# x 15   carry 5# x 1 turf lap    CC SA forward " punch 7# 3 x 8-12    NP Today:  1/2 kneeling SA landmines 15#bar 3 x 10  Handcuff to OH; GTB 3 x 10  Standing scaption in front of mirror 3# x 40hold      Patient Education and Home Exercises     Education provided:   - continue HEP    Written Home Exercises Provided: Patient instructed to cont prior HEP. Exercises were reviewed and Skye was able to demonstrate them prior to the end of the session.  Skye demonstrated good understanding of the education provided. See EMR under Patient Instructions for exercises provided during therapy sessions    ASSESSMENT     Skye did well today. She was challenged by prescribed theract with rest breaks required. Will continue to progressed as tolerated. No adverse effects reported p tx.     Skye is progressing well towards her goals.   Pt prognosis is Good.     Pt will continue to benefit from skilled outpatient physical therapy to address the deficits listed in the problem list box on initial evaluation, provide pt/family education and to maximize pt's level of independence in the home and community environment.     Pt's spiritual, cultural and educational needs considered and pt agreeable to plan of care and goals.     Anticipated barriers to physical therapy: none    Goals:   Short Term Goals: 6 weeks  1. Pt will be compliant with HEP 50% of prescribed amount. MET  2. The pt to demo improvement in R shoulder PROM to by 80% of the L (MET  3.  The pt to demo tolerance to being out of the sling for 24 hours with pain <2/10 MET     Long Term Goals: 24 weeks   Pt will be compliant with % of prescribed amount.  MET  Pt will score 20% improvement on FOTO Shoulder Mobility  MET  The pt to improvement in AROM of R shoulder within 80% of L shoulder to improve tolerance to OH movement  MET  The pt to  Demo at least 4+/5 of C muscle testing to demo improvement in tolerance to activity (Progressing, not met)  The pt to tolerate lifting 10# overhead to improve tolerance to  ADLs and work related activities  (Progressing, not met)  The pt will report full participation in ADLs and IADLs without restrictions related to R Shoulder.   (Progressing, not met)    PLAN     Progress PROM per protocol    Viridiana Haney PTA, DPT, SCS

## 2025-03-14 ENCOUNTER — CLINICAL SUPPORT (OUTPATIENT)
Dept: REHABILITATION | Facility: HOSPITAL | Age: 50
End: 2025-03-14
Payer: COMMERCIAL

## 2025-03-14 DIAGNOSIS — M25.611 DECREASED RANGE OF MOTION OF RIGHT SHOULDER: ICD-10-CM

## 2025-03-14 DIAGNOSIS — R29.898 UPPER EXTREMITY WEAKNESS: ICD-10-CM

## 2025-03-14 DIAGNOSIS — R29.3 POSTURE ABNORMALITY: Primary | ICD-10-CM

## 2025-03-14 PROCEDURE — 97112 NEUROMUSCULAR REEDUCATION: CPT

## 2025-03-14 PROCEDURE — 97530 THERAPEUTIC ACTIVITIES: CPT

## 2025-03-14 PROCEDURE — 97110 THERAPEUTIC EXERCISES: CPT

## 2025-03-14 NOTE — PROGRESS NOTES
[facility]   Physical Therapy Treatment Note     Name: Skye Marte  [facility] Number: [id number]    Therapy Diagnosis:   Encounter Diagnoses   Name Primary?    Posture abnormality Yes    Decreased range of motion of right shoulder     Upper extremity weakness      Physician: Isidro Paredes MD    Visit Date: 3/14/2025  Physician Orders: PT Eval and Treat  Medical Diagnosis from Referral:   M19.011 (ICD-10-CM) - Arthritis of right acromioclavicular joint   M75.21 (ICD-10-CM) - Biceps tendinitis of right upper extremity   M75.101 (ICD-10-CM) - Tear of right rotator cuff, unspecified tear extent, unspecified whether traumatic   Evaluation Date: 9/3/2024  Authorization Period Expiration: 12/31/2024  Plan of Care Expiration: 02/21/2025 to 04/28/205  Progress Note Due: 03/21/2025  Visit # / Visits authorized: 13/20  FOTO 3/3 and discharged     Time In: 1100 am  Time Out: 1200 pm  Total Billable Time: 60 minutes     Procedure by Reggie: 08/28/2024  1. Right shoulder arthroscopic rotator cuff repair.   2. Right shoulder open subpectoral biceps tenodesis  3. Right shoulder arthroscopic distal clavicle excision  4. Right shoulder arthroscopic subacromial decompression.   5. Right shoulder arthroscopic debridement labrum  Post-Op Precautions: Follow medium size rotator cuff repair      Precautions: Standard and post-op    SUBJECTIVE     Pt reports: had the flu last week but feeling better now    She was compliant with home exercise program.  Response to previous treatment: no adverse effects  Functional change: improved sling management    Pain: 0/10 during AROM flexion  Location: right shoulder      OBJECTIVE     DOS: 8/28/24  POD: 6 months and 14 days as of 3/14     Objective Measures updated at progress report unless specified.     Shoulder Passive Range of Motion:     Right Left   Flexion    180 deg 180 deg   ER at 0    80 deg  85 deg   ER at 45    90 deg  100+ deg   IR at 45    45  "deg 48 deg     Active Range of Motion:   Shoulder Right Left   Flexion 175 175   Abduction 170 170   Functional ER C7 C7   Functional IR L3 T10     Upper Extremity Strength:    (R) UE (L) UE   Shoulder flexion: 5/5 5/5   Shoulder Abduction: 5/5 5/5   Shoulder ER 4+/5 5/5   Shoulder IR 4-/5 5/5   Lower Trap 4-/5 4/5   Middle Trap 5/5 4/5   Serratus anterior 4-/5 4+/5   Rhomboids 5/5 5/5        Treatment     Skye received the treatments listed below:      THERAPEUTIC EXERCISES to develop strength, endurance, ROM, flexibility, posture, and core stabilization for 15 minutes including:    Flexion walkaway stretch 10 x 10"   Lat stretch behind mat 10 x 10"   Supine AA wand flexion to tolerance 3 x 10   Supine wand ER stretch @0deg 12 x 10" - np  Sleeper stretch 12 x 10"     MANUAL THERAPY TECHNIQUES including Joint mobilizations and Soft tissue Mobilization were applied to R shoulder for 00 minutes.    Assessment of shldr ROM  Oscillations for pain guarding (gr I-II)  Inferior GH mob (gr III-IV)  Posterior GH mobs (III)  PROM within protocol limits  Scap pinning into OH flexion     NEUROMUSCULAR RE-EDUCATION ACTIVITIES to improve Balance, Coordination, Kinesthetic, Sense, Proprioception, and Posture for 25 minutes.  The following were included:   S/L ER 4# 3 x 10 x 5"  SB prone T 1# 3 x 8 x 3"   SB prone Y 1# 3 x 8 x 3"     NP Today:  Prone high row to ER;0# 3 x 10   ER/IR isotonic G/BTB 3 x 10   Wall slide YTB 3 x 10  FIR iso holds 1 x 10 x 10" at various angles  FIR wall slides x 25, cueing for avoidance of protraction/ant shearing  Resisted ER @neutral -> 90deg flx -> 90/90 ER GTB 3 x 10  D2 flx GTB 3 x 10 x 3"  ER 0-90 deg GTB; 3 x burn  90/90 ER OTB 3 x 10  90/90 IR GTB 3 x 12    THERAPEUTIC ACTIVITIES to improve dynamic and functional performance for 20 minutes including:    Standing UBE lvl 7.5 x 4'/4' for UE/cardiovascular endurance  1/2 kneeling SA landmines 15#bar 3 x 10    Handcuff to OH; GTB 2 x burn    NP " Today:  Standing scaption in front of mirror 3# x 40hold    Circuit: 3 rounds   EOT bent over row 15# x 15   carry 5# x 1 turf lap    CC SA forward punch 7# 3 x 8-12    Patient Education and Home Exercises     Education provided:   - continue HEP    Written Home Exercises Provided: Patient instructed to cont prior HEP. Exercises were reviewed and Skye was able to demonstrate them prior to the end of the session.  Skye demonstrated good understanding of the education provided. See EMR under Patient Instructions for exercises provided during therapy sessions    ASSESSMENT     Skye is continuing to progress well in rotator cuff and periscapular strength and endurance. She was very challenged by T/Ys on stability ball and will benefit continue progression of this.    Skye is progressing well towards her goals.   Pt prognosis is Good.     Pt will continue to benefit from skilled outpatient physical therapy to address the deficits listed in the problem list box on initial evaluation, provide pt/family education and to maximize pt's level of independence in the home and community environment.     Pt's spiritual, cultural and educational needs considered and pt agreeable to plan of care and goals.     Anticipated barriers to physical therapy: none    Goals:   Short Term Goals: 6 weeks  1. Pt will be compliant with HEP 50% of prescribed amount. MET  2. The pt to demo improvement in R shoulder PROM to by 80% of the L (MET  3.  The pt to demo tolerance to being out of the sling for 24 hours with pain <2/10 MET     Long Term Goals: 24 weeks   Pt will be compliant with % of prescribed amount.  MET  Pt will score 20% improvement on FOTO Shoulder Mobility  MET  The pt to improvement in AROM of R shoulder within 80% of L shoulder to improve tolerance to OH movement  MET  The pt to  Demo at least 4+/5 of C muscle testing to demo improvement in tolerance to activity (Progressing, not met)  The pt to tolerate  lifting 10# overhead to improve tolerance to ADLs and work related activities  (Progressing, not met)  The pt will report full participation in ADLs and IADLs without restrictions related to R Shoulder.   (Progressing, not met)    PLAN     Progress PROM per protocol    Lelo Everett, PT, DPT, SCS

## 2025-03-21 ENCOUNTER — CLINICAL SUPPORT (OUTPATIENT)
Dept: REHABILITATION | Facility: HOSPITAL | Age: 50
End: 2025-03-21
Payer: COMMERCIAL

## 2025-03-21 DIAGNOSIS — R29.898 UPPER EXTREMITY WEAKNESS: ICD-10-CM

## 2025-03-21 DIAGNOSIS — R29.3 POSTURE ABNORMALITY: Primary | ICD-10-CM

## 2025-03-21 DIAGNOSIS — M25.611 DECREASED RANGE OF MOTION OF RIGHT SHOULDER: ICD-10-CM

## 2025-03-21 PROCEDURE — 97140 MANUAL THERAPY 1/> REGIONS: CPT | Performed by: PHYSICAL THERAPIST

## 2025-03-21 PROCEDURE — 97530 THERAPEUTIC ACTIVITIES: CPT | Performed by: PHYSICAL THERAPIST

## 2025-03-21 PROCEDURE — 97112 NEUROMUSCULAR REEDUCATION: CPT | Performed by: PHYSICAL THERAPIST

## 2025-03-21 NOTE — PROGRESS NOTES
[facility]   Physical Therapy Treatment Note     Name: Skye Marte  Clinic Number: [id number]    Therapy Diagnosis:   No diagnosis found.    Physician: Isidro Paredes MD    Visit Date: 3/21/2025  Physician Orders: PT Eval and Treat  Medical Diagnosis from Referral:   M19.011 (ICD-10-CM) - Arthritis of right acromioclavicular joint   M75.21 (ICD-10-CM) - Biceps tendinitis of right upper extremity   M75.101 (ICD-10-CM) - Tear of right rotator cuff, unspecified tear extent, unspecified whether traumatic   Evaluation Date: 9/3/2024  Authorization Period Expiration: 12/31/2024  Plan of Care Expiration: 02/21/2025 to 04/28/205  Progress Note Due: 03/21/2025  Visit # / Visits authorized: 13/20  FOTO 3/3 and discharged     Time In: 1333   Time Out: 1432  Total Billable Time: 46 minutes     Procedure by Reggie: 08/28/2024  1. Right shoulder arthroscopic rotator cuff repair.   2. Right shoulder open subpectoral biceps tenodesis  3. Right shoulder arthroscopic distal clavicle excision  4. Right shoulder arthroscopic subacromial decompression.   5. Right shoulder arthroscopic debridement labrum  Post-Op Precautions: Follow medium size rotator cuff repair      Precautions: Standard and post-op    SUBJECTIVE     Pt reports: Feels 88% recovered from her surgery. Still issues    She was compliant with home exercise program.  Response to previous treatment: no adverse effects  Functional change: improved sling management    Pain: 0/10 during AROM flexion  Location: right shoulder      OBJECTIVE     DOS: 8/28/24  POD: 6 months and 14 days as of 3/14     Objective Measures updated at progress report unless specified.     Shoulder Passive Range of Motion:     Right Left   Flexion    180 deg 180 deg   ER at 0    80 deg  85 deg   ER at 45    90 deg  100+ deg   IR at 45    45 deg 48 deg     Active Range of Motion:   Shoulder Right Left   Flexion 175 175   Abduction 170 170   Functional ER C7 C7  "  Functional IR L3 T10     Upper Extremity Strength:    (R) UE (L) UE   Shoulder flexion: 5/5 5/5   Shoulder Abduction: 5/5 5/5   Shoulder ER 4+/5 5/5   Shoulder IR 4-/5 5/5   Lower Trap 4-/5 4/5   Middle Trap 5/5 4/5   Serratus anterior 4-/5 4+/5   Rhomboids 5/5 5/5        Treatment     Skye received the treatments listed below:      THERAPEUTIC EXERCISES to develop strength, endurance, ROM, flexibility, posture, and core stabilization for 00 minutes including:    Flexion walkaway stretch 10 x 10"   Lat stretch behind mat 10 x 10"   Supine AA wand flexion to tolerance 3 x 10   Supine wand ER stretch @0deg 12 x 10" - np  Sleeper stretch 12 x 10"     MANUAL THERAPY TECHNIQUES including Joint mobilizations and Soft tissue Mobilization were applied to R shoulder for 08 minutes.    Cross body MET  Shoulder IR MET    NEUROMUSCULAR RE-EDUCATION ACTIVITIES to improve Balance, Coordination, Kinesthetic, Sense, Proprioception, and Posture for 30 minutes.  The following were included:   Self sleeper str MET  Supine IR on table with cues to avid anterior shear 2 x 10, 5" hold 2lbs  Eccentric IR in supine BTB. 3 x 8  Prone T and Y with focus on control, 5" hold 2 x 8. Manual assist for end range.    NP Today:  Prone high row to ER;0# 3 x 10   ER/IR isotonic G/BTB 3 x 10   Wall slide YTB 3 x 10  FIR iso holds 1 x 10 x 10" at various angles  FIR wall slides x 25, cueing for avoidance of protraction/ant shearing  Resisted ER @neutral -> 90deg flx -> 90/90 ER GTB 3 x 10  D2 flx GTB 3 x 10 x 3"  ER 0-90 deg GTB; 3 x burn  90/90 ER OTB 3 x 10  90/90 IR GTB 3 x 12    THERAPEUTIC ACTIVITIES to improve dynamic and functional performance for 8 minutes including:  Functional IR on wall 3 x 8 - 10      NP Today:  Standing scaption in front of mirror 3# x 40hold  Standing UBE lvl 7.5 x 4'/4' for UE/cardiovascular endurance  1/2 kneeling SA landmines 15#bar 3 x 10    Handcuff to OH; GTB 2 x burn  Circuit: 3 rounds   EOT bent over row 15# x " 15   carry 5# x 1 turf lap    CC SA forward punch 7# 3 x 8-12    Patient Education and Home Exercises     Education provided:   - continue HEP    Written Home Exercises Provided: Patient instructed to cont prior HEP. Exercises were reviewed and Skye was able to demonstrate them prior to the end of the session.  Skye demonstrated good understanding of the education provided. See EMR under Patient Instructions for exercises provided during therapy sessions    ASSESSMENT     Presents with most deficiencies in cross body mobility, posterior cuff length, and IR ROM. Worked on these throughout the session and saw improvement. She still can continue to benefit from treatment to address these issues. Updated HEP to include supine IR, IR on wall, and eccentric ER in supine.    Skye is progressing well towards her goals.   Pt prognosis is Good.     Pt will continue to benefit from skilled outpatient physical therapy to address the deficits listed in the problem list box on initial evaluation, provide pt/family education and to maximize pt's level of independence in the home and community environment.     Pt's spiritual, cultural and educational needs considered and pt agreeable to plan of care and goals.     Anticipated barriers to physical therapy: none    Goals:   Short Term Goals: 6 weeks  1. Pt will be compliant with HEP 50% of prescribed amount. MET  2. The pt to demo improvement in R shoulder PROM to by 80% of the L (MET  3.  The pt to demo tolerance to being out of the sling for 24 hours with pain <2/10 MET     Long Term Goals: 24 weeks   Pt will be compliant with % of prescribed amount.  MET  Pt will score 20% improvement on FOTO Shoulder Mobility  MET  The pt to improvement in AROM of R shoulder within 80% of L shoulder to improve tolerance to OH movement  MET  The pt to  Demo at least 4+/5 of RTC muscle testing to demo improvement in tolerance to activity (Progressing, not met)  The pt to tolerate  lifting 10# overhead to improve tolerance to ADLs and work related activities  (Progressing, not met)  The pt will report full participation in ADLs and IADLs without restrictions related to R Shoulder.   (Progressing, not met)    PLAN     Progress PROM per protocol    Clive Null, PT, DPT, DPT, OCS

## 2025-03-27 ENCOUNTER — OFFICE VISIT (OUTPATIENT)
Dept: SPORTS MEDICINE | Facility: CLINIC | Age: 50
End: 2025-03-27
Payer: COMMERCIAL

## 2025-03-27 VITALS
HEIGHT: 62 IN | SYSTOLIC BLOOD PRESSURE: 109 MMHG | BODY MASS INDEX: 23.43 KG/M2 | HEART RATE: 76 BPM | WEIGHT: 127.31 LBS | DIASTOLIC BLOOD PRESSURE: 69 MMHG

## 2025-03-27 DIAGNOSIS — Z98.890 S/P ROTATOR CUFF REPAIR: Primary | ICD-10-CM

## 2025-03-27 PROCEDURE — 3078F DIAST BP <80 MM HG: CPT | Mod: CPTII,S$GLB,, | Performed by: ORTHOPAEDIC SURGERY

## 2025-03-27 PROCEDURE — 1159F MED LIST DOCD IN RCRD: CPT | Mod: CPTII,S$GLB,, | Performed by: ORTHOPAEDIC SURGERY

## 2025-03-27 PROCEDURE — 3008F BODY MASS INDEX DOCD: CPT | Mod: CPTII,S$GLB,, | Performed by: ORTHOPAEDIC SURGERY

## 2025-03-27 PROCEDURE — 99999 PR PBB SHADOW E&M-EST. PATIENT-LVL IV: CPT | Mod: PBBFAC,,, | Performed by: ORTHOPAEDIC SURGERY

## 2025-03-27 PROCEDURE — 3074F SYST BP LT 130 MM HG: CPT | Mod: CPTII,S$GLB,, | Performed by: ORTHOPAEDIC SURGERY

## 2025-03-27 PROCEDURE — 99214 OFFICE O/P EST MOD 30 MIN: CPT | Mod: S$GLB,,, | Performed by: ORTHOPAEDIC SURGERY

## 2025-03-27 NOTE — PROGRESS NOTES
CC: Right shoulder post op 7 months    Patient is here for her 7 months post op appointment s/p below and is doing well.     DATE OF SURGERY:  8/28/2024      PREOPERATIVE DIAGNOSES:   1. Right shoulder rotator cuff tear.   2. Right shoulder biceps tendinopathy  3. Right shoulder AC joint arthritis  4. Right shoulder labral tear     POSTOPERATIVE DIAGNOSES:   1. Right shoulder rotator cuff tear.   2. Right shoulder biceps tendinopathy  3. Right shoulder AC joint arthritis  4. Right shoulder labral tear     PROCEDURE:   1. Right shoulder arthroscopic rotator cuff repair.   2. Right shoulder open subpectoral biceps tenodesis  3. Right shoulder arthroscopic distal clavicle excision  4. Right shoulder arthroscopic subacromial decompression.   5. Right shoulder arthroscopic debridement labrum     SURGEON: Isidro Paredes M.D.     Pain well tolerated on pain medication  Sling in place  No issues reported      Past Medical History:   Diagnosis Date    Hypothyroidism     Thyroid nodule        Past Surgical History:   Procedure Laterality Date    ARTHROSCOPIC DEBRIDEMENT OF ROTATOR CUFF Right 8/28/2024    Procedure: DEBRIDEMENT, ROTATOR CUFF, ARTHROSCOPIC--POLAR CARE;  Surgeon: Isidro Paredes MD;  Location: [facility] OR;  Service: Orthopedics;  Laterality: Right;  regional with catheter    ARTHROSCOPIC REPAIR OF ROTATOR CUFF OF SHOULDER  8/28/2024    Procedure: REPAIR, ROTATOR CUFF, ARTHROSCOPIC;  Surgeon: Isidro Paredes MD;  Location: [facility] OR;  Service: Orthopedics;;    ARTHROSCOPY OF SHOULDER WITH DECOMPRESSION OF SUBACROMIAL SPACE Right 8/28/2024    Procedure: ARTHROSCOPY, SHOULDER, WITH SUBACROMIAL SPACE DECOMPRESSION;  Surgeon: Isidro Paredes MD;  Location: [facility] OR;  Service: Orthopedics;  Laterality: Right;    ARTHROSCOPY OF SHOULDER WITH REMOVAL OF DISTAL CLAVICLE Right 8/28/2024    Procedure: ARTHROSCOPY, SHOULDER, WITH DISTAL CLAVICLE EXCISION;  Surgeon: Isidro Paredes MD;  Location: [facility] OR;   Service: Orthopedics;  Laterality: Right;    COLONOSCOPY      TENODESIS, BICEPS, OPEN Right 8/28/2024    Procedure: TENODESIS, BICEPS, OPEN;  Surgeon: Isidro Paredes MD;  Location: [facility] OR;  Service: Orthopedics;  Laterality: Right;    TONSILLECTOMY         Family History   Problem Relation Name Age of Onset    Breast cancer Paternal Grandmother      Colon cancer Neg Hx      Ovarian cancer Neg Hx           Current Outpatient Medications:     calcium/folic ac/multivit-min (MULTIVITAMIN-MIN-CALCIUM-FA ORAL), Take 1 capsule by mouth once daily., Disp: , Rfl:     cetirizine (ZYRTEC) 10 MG tablet, Take 10 mg by mouth once daily., Disp: , Rfl:     estradioL (ESTRACE) 1 MG tablet, Take 1 mg by mouth once daily., Disp: , Rfl:     ferrous sulfate (FEOSOL) Tab tablet, Take 1 tablet by mouth daily with breakfast., Disp: , Rfl:     hydroxychloroquine (PLAQUENIL) 200 mg tablet, , Disp: , Rfl: 0    levothyroxine (SYNTHROID) 75 MCG tablet, Take 75 mcg by mouth once daily., Disp: , Rfl: 0    omeprazole (PRILOSEC) 40 MG capsule, Take 40 mg by mouth every morning., Disp: , Rfl:     vitamin D (VITAMIN D3) 1000 units Tab, Take 1,000 Units by mouth once daily., Disp: , Rfl:     zinc sulfate (ZINC-15 ORAL), Take by mouth., Disp: , Rfl:     aspirin 81 MG Chew, Chew and swallow 1 tablet (81 mg total) by mouth 2 (two) times a day for 14 days, Disp: 28 tablet, Rfl: 0    BIFIDOBACTERIUM ANIMALIS ORAL, Take 1 tablet by mouth once daily. (Patient not taking: Reported on 3/27/2025), Disp: , Rfl:     hydrOXYzine pamoate (VISTARIL) 25 MG Cap, take 1 capsule by mouth four times a day (Patient not taking: Reported on 3/27/2025), Disp: , Rfl: 0    levocetirizine (XYZAL) 5 MG tablet, Take 5 mg by mouth nightly as needed for Allergies. (Patient not taking: Reported on 3/27/2025), Disp: , Rfl: 0    meloxicam (MOBIC) 7.5 MG tablet, Take 1 tablet (7.5 mg total) by mouth once daily. (Patient not taking: Reported on 3/27/2025), Disp: 30 tablet, Rfl:  3    oxyCODONE-acetaminophen (PERCOCET)  mg per tablet, Take 1 tablet by mouth every 6 (six) hours as needed for Pain. (Patient not taking: Reported on 3/27/2025), Disp: 28 tablet, Rfl: 0    prednisoLONE acetate (PRED FORTE) 1 % DrpS, , Disp: , Rfl: 0    progesterone (PROMETRIUM) 200 MG capsule, Take 200 mg by mouth every evening. (Patient not taking: Reported on 3/27/2025), Disp: , Rfl:     promethazine (PHENERGAN) 25 MG tablet, Take 1 tablet (25 mg total) by mouth every 6 (six) hours as needed for Nausea. (Patient not taking: Reported on 3/27/2025), Disp: 20 tablet, Rfl: 0    traMADoL (ULTRAM) 50 mg tablet, Take 1 tablet (50 mg total) by mouth every 6 (six) hours as needed for Pain. (Patient not taking: Reported on 3/27/2025), Disp: 20 tablet, Rfl: 0    triamcinolone acetonide 0.1% (KENALOG) 0.1 % cream, Apply topically 2 (two) times daily., Disp: 45 g, Rfl: 2    turmeric-ging-olive-oreg-capry 100 mg-150 mg- 50 mg-150 mg Cap, Take 1 tablet by mouth once daily. (Patient not taking: Reported on 3/27/2025), Disp: , Rfl:     Review of patient's allergies indicates:  No Known Allergies       REVIEW OF SYSTEMS:  Constitution: Negative. Negative for chills, fever and night sweats.   HENT: Negative for congestion and headaches.    Eyes: Negative for blurred vision, left vision loss and right vision loss.   Cardiovascular: Negative for chest pain and syncope.   Respiratory: Negative for cough and shortness of breath.    Endocrine: Negative for polydipsia, polyphagia and polyuria.   Hematologic/Lymphatic: Negative for bleeding problem. Does not bruise/bleed easily.   Skin: Negative for dry skin, itching and rash.   Musculoskeletal: Negative for falls.  Positive for right shoulder pain and muscle weakness.   Gastrointestinal: Negative for abdominal pain and bowel incontinence.   Genitourinary: Negative for bladder incontinence and nocturia.   Neurological: Negative for disturbances in coordination, loss of balance and  "seizures.   Psychiatric/Behavioral: Negative for depression. The patient does not have insomnia.    Allergic/Immunologic: Negative for hives and persistent infections.      PHYSICAL EXAMINATION:  Vitals:  /69 (BP Location: Right arm, Patient Position: Sitting)   Pulse 76   Ht 5' 1.5" (1.562 m)   Wt 57.7 kg (127 lb 5.1 oz)   LMP 11/28/2019   BMI 23.67 kg/m²    General: The patient is alert and oriented x 3.  Mood is pleasant.  Observation of ears, eyes and nose reveal no gross abnormalities.  No labored breathing observed.  Gait is coordinated. Patient can toe walk and heel walk without difficulty.      RIGHT SHOULDER / UPPER EXTREMITY EXAM    OBSERVATION:     Swelling  none  Deformity  none   Discoloration  none   Scapular winging none   Scars   none  Atrophy  none    TENDERNESS / CREPITUS (T/C):          T/C      T/C   Clavicle   -/-  SUPRAspinatus    -/-     AC Jt.    -/-  INFRAspinatus  -/-    SC Jt.    -/-  Deltoid    -/-      G. Tuberosity  -/-  LH BICEP groove  -/-   Acromion:  -/-  Midline Neck   -/-     Scapular Spine -/-  Trapezium   -/-   SMA Scapula  -/-  GH jt. line - post  -/-     Scapulothoracic  -/-         ROM: (* = with pain)  Left shoulder   Right shoulder        AROM (PROM)   AROM (PROM)   FE    170° (175°)     150° (155°)     ER at 0°    60°  (65°)    50°  (55°)   ER at 90° ABD  90°  (90°)    90°  (90°)   IR at 90°  ABD   NA  (40°)     NA  (40°)      IR (spine level)   T10     L2    STRENGTH: (* = with pain) Left shoulder   Right shoulder   SCAPTION   5/5    4+/5    IR    5/5    4/5   ER    5/5    4+/5   BICEPS   5/5    4+/5   Deltoid    5/5    4+/5     SIGNS:  Painful side       NEER   -    OGILS  neg    MEDINA   -    SPEEDS  neg     DROP ARM   -   BELLY PRESS neg   Superior escape none    LIFT-OFF  neg   X-Body ADD    neg    MOVING VALGUS neg        STABILITY TESTING    Left shoulder   Right shoulder    Translation     Anterior  up face     up face    Posterior  up " face    up face    Sulcus   < 10mm    < 10 mm     Signs   Apprehension   neg      neg       Relocation   no change     no change      Jerk test  neg     neg    EXTREMITY NEURO-VASCULAR EXAM:    Sensation grossly intact to light touch all dermatomal regions.    DTR 2+ Biceps, Triceps, BR and Negative Kwames sign   Grossly intact motor function at Elbow, Wrist and Hand   Distal pulses radial and ulnar 2+, brisk cap refill, symmetric.      NECK:  Painless FROM and spinous processes non-tender. Negative Spurlings sign.      OTHER FINDINGS:      Imaging:  No imaging done at this visit.           ASSESSMENT:   Right shoulder pain:  1. S/P rotator cuff repair          PLAN:      1. Continue PT    2. F/u in 3 months.      All questions were answered, patient will contact us for questions or concerns in the interim.

## 2025-03-28 ENCOUNTER — CLINICAL SUPPORT (OUTPATIENT)
Dept: REHABILITATION | Facility: HOSPITAL | Age: 50
End: 2025-03-28
Payer: COMMERCIAL

## 2025-03-28 DIAGNOSIS — R29.3 POSTURE ABNORMALITY: Primary | ICD-10-CM

## 2025-03-28 DIAGNOSIS — R29.898 UPPER EXTREMITY WEAKNESS: ICD-10-CM

## 2025-03-28 DIAGNOSIS — M25.611 DECREASED RANGE OF MOTION OF RIGHT SHOULDER: ICD-10-CM

## 2025-03-28 PROCEDURE — 97140 MANUAL THERAPY 1/> REGIONS: CPT | Mod: CQ

## 2025-03-28 PROCEDURE — 97110 THERAPEUTIC EXERCISES: CPT | Mod: CQ

## 2025-03-28 PROCEDURE — 97530 THERAPEUTIC ACTIVITIES: CPT | Mod: CQ

## 2025-03-28 PROCEDURE — 97112 NEUROMUSCULAR REEDUCATION: CPT | Mod: CQ

## 2025-03-28 NOTE — PROGRESS NOTES
[facility]   Physical Therapy Treatment Note     Name: Skye Marte  [facility] Number: [id number]    Therapy Diagnosis:   Encounter Diagnoses   Name Primary?    Posture abnormality Yes    Decreased range of motion of right shoulder     Upper extremity weakness        Physician: Isidro Paredes MD    Visit Date: 3/28/2025  Physician Orders: PT Eval and Treat  Medical Diagnosis from Referral:   M19.011 (ICD-10-CM) - Arthritis of right acromioclavicular joint   M75.21 (ICD-10-CM) - Biceps tendinitis of right upper extremity   M75.101 (ICD-10-CM) - Tear of right rotator cuff, unspecified tear extent, unspecified whether traumatic   Evaluation Date: 9/3/2024  Authorization Period Expiration: 12/31/2024  Plan of Care Expiration: 02/21/2025 to 04/28/205  Progress Note Due: 03/21/2025  Visit # / Visits authorized: 15/20  FOTO 3/3 and discharged     Time In: 0803  Time Out: 0910  Total Billable Time: 60 minutes     Procedure by Reggie: 08/28/2024  1. Right shoulder arthroscopic rotator cuff repair.   2. Right shoulder open subpectoral biceps tenodesis  3. Right shoulder arthroscopic distal clavicle excision  4. Right shoulder arthroscopic subacromial decompression.   5. Right shoulder arthroscopic debridement labrum  Post-Op Precautions: Follow medium size rotator cuff repair      Precautions: Standard and post-op    SUBJECTIVE     Pt reports: no new complaints. Continued stiffness reaching behind her back.     She was compliant with home exercise program.  Response to previous treatment: no adverse effects  Functional change: improved sling management    Pain: 0/10 during AROM flexion  Location: right shoulder      OBJECTIVE     DOS: 8/28/24  POD: 7 months as of 3/28    Objective Measures updated at progress report unless specified.     Shoulder Passive Range of Motion:     Right Left   Flexion    180 deg 180 deg   ER at 0    80 deg  85 deg   ER at 45    90 deg  100+ deg   IR at 45     "45 deg 48 deg     Active Range of Motion:   Shoulder Right Left   Flexion 175 175   Abduction 170 170   Functional ER C7 C7   Functional IR L3 T10     Upper Extremity Strength:    (R) UE (L) UE   Shoulder flexion: 5/5 5/5   Shoulder Abduction: 5/5 5/5   Shoulder ER 4+/5 5/5   Shoulder IR 4-/5 5/5   Lower Trap 4-/5 4/5   Middle Trap 5/5 4/5   Serratus anterior 4-/5 4+/5   Rhomboids 5/5 5/5        Treatment     Skye received the treatments listed below:      THERAPEUTIC EXERCISES to develop strength, endurance, ROM, flexibility, posture, and core stabilization for 20 minutes including:    Flexion walkaway stretch 10 x 10" - np  Lat stretch behind mat 10 x 10"   Supine AA wand flexion to tolerance 3 x 10   Supine wand ER stretch @0deg 12 x 10"   Sleeper stretch 12 x 10"   FIR wall slides 20 x 5"   FIR towel stretch 10 x 10" - pt inquired about this stretch    MANUAL THERAPY TECHNIQUES including Joint mobilizations and Soft tissue Mobilization were applied to R shoulder for 08 minutes.    Assessment of shldr ROM  Inferior, posterior GH mobs (gr III-IV)  PROM in all planes to tolerance  Scap pinning into OH flexion  Cross body MET  Shoulder IR MET    NEUROMUSCULAR RE-EDUCATION ACTIVITIES to improve Balance, Coordination, Kinesthetic, Sense, Proprioception, and Posture for 15 minutes.  The following were included:     S/L ER 4# 3 x 8 x 5"   Prone T 1# 2 x 10 x 3"   Prone Y 1# 2 x 10 x 3"     NP Today:  Supine IR on table with cues to avid anterior shear 2 x 10, 5" hold 2lbs  Eccentric IR in supine BTB. 3 x 8  Prone high row to ER;0# 3 x 10   Wall slide YTB 3 x 10  Resisted ER @neutral -> 90deg flx -> 90/90 ER GTB 3 x 10  D2 flx GTB 3 x 10 x 3"  ER 0-90 deg GTB; 3 x burn  90/90 ER OTB 3 x 10  90/90 IR GTB 3 x 12    THERAPEUTIC ACTIVITIES to improve dynamic and functional performance for 17 minutes including:    Standing UBE lvl 7.5 x 4'/4' for UE/cardiovascular endurance    Circuit: 3 rounds   EOT bent over row 10# x " 15   carry -> OH press 5# x 1 turf lap    NP:  Standing scaption in front of mirror 3# x 40hold  1/2 kneeling SA landmines 15#bar 3 x 10  Handcuff to OH; GTB 2 x burn  CC SA forward punch 7# 3 x 8-12      Patient Education and Home Exercises     Education provided:   - continue HEP    Written Home Exercises Provided: Patient instructed to cont prior HEP. Exercises were reviewed and Skye was able to demonstrate them prior to the end of the session.  Skye demonstrated good understanding of the education provided. See EMR under Patient Instructions for exercises provided during therapy sessions    ASSESSMENT     Skye did well today. Continued posterior capsular restrictions and FIR deficits; however, her FIR improved within session. She was challenged by progressed S/L ER and theract circuit at end of tx. Will continue to progress as tolerated. No adverse effects reported p tx.     Skye is progressing well towards her goals.   Pt prognosis is Good.     Pt will continue to benefit from skilled outpatient physical therapy to address the deficits listed in the problem list box on initial evaluation, provide pt/family education and to maximize pt's level of independence in the home and community environment.     Pt's spiritual, cultural and educational needs considered and pt agreeable to plan of care and goals.     Anticipated barriers to physical therapy: none    Goals:   Short Term Goals: 6 weeks  1. Pt will be compliant with HEP 50% of prescribed amount. MET  2. The pt to demo improvement in R shoulder PROM to by 80% of the L (MET  3.  The pt to demo tolerance to being out of the sling for 24 hours with pain <2/10 MET     Long Term Goals: 24 weeks   Pt will be compliant with % of prescribed amount.  MET  Pt will score 20% improvement on FOTO Shoulder Mobility  MET  The pt to improvement in AROM of R shoulder within 80% of L shoulder to improve tolerance to OH movement  MET  The pt to  Demo at least  4+/5 of RTC muscle testing to demo improvement in tolerance to activity (Progressing, not met)  The pt to tolerate lifting 10# overhead to improve tolerance to ADLs and work related activities  (Progressing, not met)  The pt will report full participation in ADLs and IADLs without restrictions related to R Shoulder.   (Progressing, not met)    PLAN     Progress PROM per protocol    Viridiana Haney, PTA

## 2025-04-04 ENCOUNTER — CLINICAL SUPPORT (OUTPATIENT)
Dept: REHABILITATION | Facility: HOSPITAL | Age: 50
End: 2025-04-04
Payer: COMMERCIAL

## 2025-04-04 ENCOUNTER — TELEPHONE (OUTPATIENT)
Dept: INFECTIOUS DISEASES | Facility: CLINIC | Age: 50
End: 2025-04-04
Payer: COMMERCIAL

## 2025-04-04 DIAGNOSIS — M25.611 DECREASED RANGE OF MOTION OF RIGHT SHOULDER: ICD-10-CM

## 2025-04-04 DIAGNOSIS — R29.898 UPPER EXTREMITY WEAKNESS: ICD-10-CM

## 2025-04-04 DIAGNOSIS — R29.3 POSTURE ABNORMALITY: Primary | ICD-10-CM

## 2025-04-04 PROCEDURE — 97140 MANUAL THERAPY 1/> REGIONS: CPT | Mod: CQ

## 2025-04-04 PROCEDURE — 97110 THERAPEUTIC EXERCISES: CPT | Mod: CQ

## 2025-04-04 PROCEDURE — 97530 THERAPEUTIC ACTIVITIES: CPT | Mod: CQ

## 2025-04-04 PROCEDURE — 97112 NEUROMUSCULAR REEDUCATION: CPT | Mod: CQ

## 2025-04-04 NOTE — TELEPHONE ENCOUNTER
Spoke to pt, explained vaccine prices and CPT codes.    ----- Message from Rtuh sent at 4/4/2025  3:33 PM CDT -----  Regarding: Appt Consult  Contact: Skye DAVILA/ADVISORYName of Caller: Dank Preference: [phone] (home) Nature of Call: Pt would like to speak with someone about the potential fees for her appt on [facility] in order to prepare properly for it.

## 2025-04-04 NOTE — PROGRESS NOTES
[facility]   Physical Therapy Treatment Note     Name: Skye Marte  [facility] Number: [id number]    Therapy Diagnosis:   Encounter Diagnoses   Name Primary?    Posture abnormality Yes    Decreased range of motion of right shoulder     Upper extremity weakness      Physician: Isidro Paredes MD    Visit Date: 4/4/2025  Physician Orders: PT Eval and Treat  Medical Diagnosis from Referral:   M19.011 (ICD-10-CM) - Arthritis of right acromioclavicular joint   M75.21 (ICD-10-CM) - Biceps tendinitis of right upper extremity   M75.101 (ICD-10-CM) - Tear of right rotator cuff, unspecified tear extent, unspecified whether traumatic   Evaluation Date: 9/3/2024  Authorization Period Expiration: 12/31/2024  Plan of Care Expiration: 02/21/2025 to 04/28/205  Progress Note Due: 03/21/2025  Visit # / Visits authorized: 16/20  FOTO 3/3 and discharged     Time In: 0802  Time Out: 0908  Total Billable Time: 62 minutes     Procedure by Reggie: 08/28/2024  1. Right shoulder arthroscopic rotator cuff repair.   2. Right shoulder open subpectoral biceps tenodesis  3. Right shoulder arthroscopic distal clavicle excision  4. Right shoulder arthroscopic subacromial decompression.   5. Right shoulder arthroscopic debridement labrum  Post-Op Precautions: Follow medium size rotator cuff repair      Precautions: Standard and post-op    SUBJECTIVE     Pt reports: no new complaints.     She was compliant with home exercise program.  Response to previous treatment: no adverse effects  Functional change: improved sling management    Pain: 0/10 during AROM flexion  Location: right shoulder      OBJECTIVE     DOS: 8/28/24  POD: 7 months as of 3/28    Objective Measures updated at progress report unless specified.     Shoulder Passive Range of Motion:     Right Left   Flexion    180 deg 180 deg   ER at 0    80 deg  85 deg   ER at 45    90 deg  100+ deg   IR at 45    45 deg 48 deg     Active Range of Motion:  "  Shoulder Right Left   Flexion 175 175   Abduction 170 170   Functional ER C7 C7   Functional IR L3 T10     Upper Extremity Strength:    (R) UE (L) UE   Shoulder flexion: 5/5 5/5   Shoulder Abduction: 5/5 5/5   Shoulder ER 4+/5 5/5   Shoulder IR 4-/5 5/5   Lower Trap 4-/5 4/5   Middle Trap 5/5 4/5   Serratus anterior 4-/5 4+/5   Rhomboids 5/5 5/5        Treatment     Skye received the treatments listed below:      THERAPEUTIC EXERCISES to develop strength, endurance, ROM, flexibility, posture, and core stabilization for 12 minutes including:    Lat stretch behind mat 10 x 10"   Supine AA wand flexion to tolerance x 30  Supine wand ER stretch @0deg 10 x 10"   Sleeper stretch 12 x 10"     NP:  Flexion walkaway stretch 10 x 10"   FIR wall slides 20 x 5"   FIR towel stretch 10 x 10" - pt inquired about this stretch    MANUAL THERAPY TECHNIQUES including Joint mobilizations and Soft tissue Mobilization were applied to R shoulder for 08 minutes.    Assessment of shldr ROM  Inferior, posterior GH mobs (gr III-IV)  PROM in all planes to tolerance  Scap pinning into OH flexion  Cross body MET  Shoulder IR MET    NEUROMUSCULAR RE-EDUCATION ACTIVITIES to improve Balance, Coordination, Kinesthetic, Sense, Proprioception, and Posture for 24 minutes.  The following were included:     S/L ER 4# 3 x 8 x 5"   Prone T 1# 2 x 10 x 3"   Prone Y 1# 2 x 10 x 3"   90/90 ER OTB 3 x 10  90/90 IR GTB 3 x 12    NP Today:  Supine IR on table with cues to avid anterior shear 2 x 10, 5" hold 2lbs  Eccentric IR in supine BTB. 3 x 8  Prone high row to ER;0# 3 x 10   Wall slide YTB 3 x 10  Resisted ER @neutral -> 90deg flx -> 90/90 ER GTB 3 x 10  ER 0-90 deg GTB; 3 x burn    THERAPEUTIC ACTIVITIES to improve dynamic and functional performance for 18 minutes including:    Standing UBE lvl 7.5 x 4'/4' for UE/cardiovascular endurance  Standing scaption in front of mirror 4# x 40hold  D2 flx 3# 3 x 5-8    NP:  Circuit: 3 rounds   EOT bent over row " 10# x 15   carry -> OH press 5# x 1 turf lap  1/2 kneeling SA landmines 15#bar 3 x 10  Handcuff to OH; GTB 2 x burn  CC SA forward punch 7# 3 x 8-12      Patient Education and Home Exercises     Education provided:   - continue HEP    Written Home Exercises Provided: Patient instructed to cont prior HEP. Exercises were reviewed and Skye was able to demonstrate them prior to the end of the session.  Skye demonstrated good understanding of the education provided. See EMR under Patient Instructions for exercises provided during therapy sessions    ASSESSMENT     Skye did well today. Good postural control noted during prone T/Y's, 90/90 ER/IR, and progressed D2 flx; fatigue reported during the latter. Will continue to progress as tolerated. No adverse effects reported p tx.     Skye is progressing well towards her goals.   Pt prognosis is Good.     Pt will continue to benefit from skilled outpatient physical therapy to address the deficits listed in the problem list box on initial evaluation, provide pt/family education and to maximize pt's level of independence in the home and community environment.     Pt's spiritual, cultural and educational needs considered and pt agreeable to plan of care and goals.     Anticipated barriers to physical therapy: none    Goals:   Short Term Goals: 6 weeks  1. Pt will be compliant with HEP 50% of prescribed amount. MET  2. The pt to demo improvement in R shoulder PROM to by 80% of the L (MET  3.  The pt to demo tolerance to being out of the sling for 24 hours with pain <2/10 MET     Long Term Goals: 24 weeks   Pt will be compliant with % of prescribed amount.  MET  Pt will score 20% improvement on FOTO Shoulder Mobility  MET  The pt to improvement in AROM of R shoulder within 80% of L shoulder to improve tolerance to OH movement  MET  The pt to  Demo at least 4+/5 of RTC muscle testing to demo improvement in tolerance to activity (Progressing, not met)  The pt to  tolerate lifting 10# overhead to improve tolerance to ADLs and work related activities  (Progressing, not met)  The pt will report full participation in ADLs and IADLs without restrictions related to R Shoulder.   (Progressing, not met)    PLAN     Progress PROM per protocol    Viridiana Haney, PTA

## 2025-04-10 ENCOUNTER — PATIENT MESSAGE (OUTPATIENT)
Dept: REHABILITATION | Facility: HOSPITAL | Age: 50
End: 2025-04-10
Payer: COMMERCIAL

## 2025-04-11 ENCOUNTER — CLINICAL SUPPORT (OUTPATIENT)
Dept: REHABILITATION | Facility: HOSPITAL | Age: 50
End: 2025-04-11
Payer: COMMERCIAL

## 2025-04-11 DIAGNOSIS — R29.898 UPPER EXTREMITY WEAKNESS: ICD-10-CM

## 2025-04-11 DIAGNOSIS — R29.3 POSTURE ABNORMALITY: Primary | ICD-10-CM

## 2025-04-11 DIAGNOSIS — M25.611 DECREASED RANGE OF MOTION OF RIGHT SHOULDER: ICD-10-CM

## 2025-04-11 PROCEDURE — 97112 NEUROMUSCULAR REEDUCATION: CPT | Performed by: PHYSICAL THERAPIST

## 2025-04-11 PROCEDURE — 97140 MANUAL THERAPY 1/> REGIONS: CPT | Performed by: PHYSICAL THERAPIST

## 2025-04-11 PROCEDURE — 97530 THERAPEUTIC ACTIVITIES: CPT | Performed by: PHYSICAL THERAPIST

## 2025-04-12 NOTE — PROGRESS NOTES
[facility]   Physical Therapy Treatment Note     Name: Skye Marte  Clinic Number: [id number]    Therapy Diagnosis:   No diagnosis found.    Physician: Isidro Paredes MD    Visit Date: 4/11/2025  Physician Orders: PT Eval and Treat  Medical Diagnosis from Referral:   M19.011 (ICD-10-CM) - Arthritis of right acromioclavicular joint   M75.21 (ICD-10-CM) - Biceps tendinitis of right upper extremity   M75.101 (ICD-10-CM) - Tear of right rotator cuff, unspecified tear extent, unspecified whether traumatic   Evaluation Date: 9/3/2024  Authorization Period Expiration: 12/31/2024  Plan of Care Expiration: 02/21/2025 to 04/28/205  Progress Note Due: 03/21/2025  Visit # / Visits authorized: 17/20  FOTO 3/3 and discharged     Time In: 1425  Time Out: 1540  Total Billable Time: 42 minutes     Procedure by Reggie: 08/28/2024  1. Right shoulder arthroscopic rotator cuff repair.   2. Right shoulder open subpectoral biceps tenodesis  3. Right shoulder arthroscopic distal clavicle excision  4. Right shoulder arthroscopic subacromial decompression.   5. Right shoulder arthroscopic debridement labrum  Post-Op Precautions: Follow medium size rotator cuff repair      Precautions: Standard and post-op    SUBJECTIVE     Pt reports: no new complaints.     She was compliant with home exercise program.  Response to previous treatment: no adverse effects  Functional change: improved sling management    Pain: 0/10 during AROM flexion  Location: right shoulder      OBJECTIVE     DOS: 8/28/24  POD: 7 months as of 3/28    Objective Measures updated at progress report unless specified.         Shoulder Passive Range of Motion:     Right Left   Flexion    160 deg 180 deg   ER at 0    80 deg  85 deg   ER at 90    90 deg  100+ deg   IR at 90    30 deg 48 deg     Active Range of Motion:   Shoulder Right Left   Flexion 175 175   Abduction 170 170   Functional ER C7 C7   Functional IR L3 T10     Upper  "Extremity Strength:    (R) UE (L) UE   Shoulder flexion: 5/5 5/5   Shoulder Abduction: 5/5 5/5   Shoulder ER 4+/5 5/5   Shoulder IR 4-/5 5/5   Lower Trap 4-/5 4/5   Middle Trap 5/5 4/5   Serratus anterior 4-/5 4+/5   Rhomboids 5/5 5/5      Insufficient scapular posterior tilt with shoulder flexion and ER. Early and excessive scapular abduction.     Poor posterior capsule mobility. Length deficits in the posterior cuff, lat, and pec minor muscles.    Treatment     Skye received the treatments listed below:      THERAPEUTIC EXERCISES to develop strength, endurance, ROM, flexibility, posture, and core stabilization for 6 minutes including:    Lat stretch behind mat 10 x 10"   Sleeper stretch 5 x 30"     NP:  Flexion walkaway stretch 10 x 10"   FIR wall slides 20 x 5"   FIR towel stretch 10 x 10" - pt inquired about this stretch    MANUAL THERAPY TECHNIQUES including Joint mobilizations and Soft tissue Mobilization were applied to R shoulder for 08 minutes.    Assessment of shldr ROM, joint mobility, and strength as above  Inferior, posterior GH mobs (gr III-IV)  PROM in all planes to tolerance  Scap pinning into OH flexion  Cross body MET  Shoulder IR MET    NEUROMUSCULAR RE-EDUCATION ACTIVITIES to improve Balance, Coordination, Kinesthetic, Sense, Proprioception, and Posture for 24 minutes.  The following were included:     Supine IR on table with cues to avid anterior shear 3 x 10, 5" hold 2lbs  Eccentric IR in supine BlackTB. 3 x 8  Prone T and Y lvl 1 with focus on posterior tilt, decreased UT activation 3 x burn ea      NP Today:  Supine IR on table with cues to avid anterior shear 2 x 10, 5" hold 2lbs  Eccentric IR in supine BTB. 3 x 8  Prone high row to ER;0# 3 x 10   Wall slide YTB 3 x 10  Resisted ER @neutral -> 90deg flx -> 90/90 ER GTB 3 x 10  ER 0-90 deg GTB; 3 x burn    THERAPEUTIC ACTIVITIES to improve dynamic and functional performance for 10 minutes including:    Standing scaption in front of mirror " 4# x 40hold  Standing scaption in front of mirror full range for functional reach training 20x  Wall slide to pull off for functional reach training, 20x focus on posterior tilt and decreased UT acitvation    NP:  Circuit: 3 rounds   EOT bent over row 10# x 15   carry -> OH press 5# x 1 turf lap  1/2 kneeling SA landmines 15#bar 3 x 10  Handcuff to OH; GTB 2 x burn  CC SA forward punch 7# 3 x 8-12      Patient Education and Home Exercises     Education provided:   - continue HEP    Written Home Exercises Provided: Patient instructed to cont prior HEP. Exercises were reviewed and Skye was able to demonstrate them prior to the end of the session.  Skye demonstrated good understanding of the education provided. See EMR under Patient Instructions for exercises provided during therapy sessions    ASSESSMENT     Primary limitations include glenohumeral mobility (posterior glide), muscle length deficits, and insufficient mid/low trapezius strength. Most of the session was focused on shoulder mobility and retraining into the new range. Did well and we updated her home exercise plan.    Skey is progressing well towards her goals.   Pt prognosis is Good.     Pt will continue to benefit from skilled outpatient physical therapy to address the deficits listed in the problem list box on initial evaluation, provide pt/family education and to maximize pt's level of independence in the home and community environment.     Pt's spiritual, cultural and educational needs considered and pt agreeable to plan of care and goals.     Anticipated barriers to physical therapy: none    Goals:   Short Term Goals: 6 weeks  1. Pt will be compliant with HEP 50% of prescribed amount. MET  2. The pt to demo improvement in R shoulder PROM to by 80% of the L (MET  3.  The pt to demo tolerance to being out of the sling for 24 hours with pain <2/10 MET     Long Term Goals: 24 weeks   Pt will be compliant with % of prescribed amount.   MET  Pt will score 20% improvement on FOTO Shoulder Mobility  MET  The pt to improvement in AROM of R shoulder within 80% of L shoulder to improve tolerance to OH movement  MET  The pt to  Demo at least 4+/5 of RTC muscle testing to demo improvement in tolerance to activity (Progressing, not met)  The pt to tolerate lifting 10# overhead to improve tolerance to ADLs and work related activities  (Progressing, not met)  The pt will report full participation in ADLs and IADLs without restrictions related to R Shoulder.   (Progressing, not met)    PLAN     Focus on full shoulder mobility, motor training in new range. Gross strengthening to sub scap, trapezius, serratus anterior.    Clive Null, PT, DPT

## 2025-04-16 ENCOUNTER — CLINICAL SUPPORT (OUTPATIENT)
Dept: REHABILITATION | Facility: HOSPITAL | Age: 50
End: 2025-04-16
Payer: COMMERCIAL

## 2025-04-16 DIAGNOSIS — M25.611 DECREASED RANGE OF MOTION OF RIGHT SHOULDER: ICD-10-CM

## 2025-04-16 DIAGNOSIS — R29.3 POSTURE ABNORMALITY: Primary | ICD-10-CM

## 2025-04-16 DIAGNOSIS — R29.898 UPPER EXTREMITY WEAKNESS: ICD-10-CM

## 2025-04-16 PROCEDURE — 97140 MANUAL THERAPY 1/> REGIONS: CPT | Mod: CQ

## 2025-04-16 PROCEDURE — 97112 NEUROMUSCULAR REEDUCATION: CPT | Mod: CQ

## 2025-04-16 PROCEDURE — 97110 THERAPEUTIC EXERCISES: CPT | Mod: CQ

## 2025-04-16 NOTE — PROGRESS NOTES
[facility]   Physical Therapy Treatment Note     Name: Skye Marte  [facility] Number: [id number]    Therapy Diagnosis:   Encounter Diagnoses   Name Primary?    Posture abnormality Yes    Decreased range of motion of right shoulder     Upper extremity weakness        Physician: Isidro Paredes MD    Visit Date: 4/16/2025  Physician Orders: PT Eval and Treat  Medical Diagnosis from Referral:   M19.011 (ICD-10-CM) - Arthritis of right acromioclavicular joint   M75.21 (ICD-10-CM) - Biceps tendinitis of right upper extremity   M75.101 (ICD-10-CM) - Tear of right rotator cuff, unspecified tear extent, unspecified whether traumatic   Evaluation Date: 9/3/2024  Authorization Period Expiration: 12/31/2024  Plan of Care Expiration: 02/21/2025 to 04/28/205  Progress Note Due: 03/21/2025  Visit # / Visits authorized: 18/20  FOTO 3/3 and discharged     Time In: 1700  Time Out: 1810  Total Billable Time: 60 minutes     Procedure by Reggie: 08/28/2024  1. Right shoulder arthroscopic rotator cuff repair.   2. Right shoulder open subpectoral biceps tenodesis  3. Right shoulder arthroscopic distal clavicle excision  4. Right shoulder arthroscopic subacromial decompression.   5. Right shoulder arthroscopic debridement labrum  Post-Op Precautions: Follow medium size rotator cuff repair      Precautions: Standard and post-op    SUBJECTIVE     Pt reports: no new complaints. She was frustrated by how difficult it was to not use her UT last session.     She was compliant with home exercise program.  Response to previous treatment: no adverse effects  Functional change: improved sling management    Pain: 0/10 during AROM flexion  Location: right shoulder      OBJECTIVE     DOS: 8/28/24  POD: 7 months, 19 days as of 4/16    Objective Measures updated at progress report unless specified.     Shoulder Passive Range of Motion:     Right Left   Flexion    160 deg 180 deg   ER at 0    80 deg  85 deg   ER at  "90    90 deg  100+ deg   IR at 90    30 deg 48 deg     Active Range of Motion:   Shoulder Right Left   Flexion 175 175   Abduction 170 170   Functional ER C7 C7   Functional IR L3 T10     Upper Extremity Strength:    (R) UE (L) UE   Shoulder flexion: 5/5 5/5   Shoulder Abduction: 5/5 5/5   Shoulder ER 4+/5 5/5   Shoulder IR 4-/5 5/5   Lower Trap 4-/5 4/5   Middle Trap 5/5 4/5   Serratus anterior 4-/5 4+/5   Rhomboids 5/5 5/5      Insufficient scapular posterior tilt with shoulder flexion and ER. Early and excessive scapular abduction.     Poor posterior capsule mobility. Length deficits in the posterior cuff, lat, and pec minor muscles.    Treatment     Skye received the treatments listed below:      THERAPEUTIC EXERCISES to develop strength, endurance, ROM, flexibility, posture, and core stabilization for 10 minutes including:    Flexion walkaway stretch 10 x 10"   Lat stretch behind mat 10 x 10"   Sleeper stretch 5 x 30"     MANUAL THERAPY TECHNIQUES including Joint mobilizations and Soft tissue Mobilization were applied to R shoulder for 08 minutes.    Assessment of shldr ROM, joint mobility, and strength as above  Inferior, posterior GH mobs (gr III-IV)  PROM in all planes to tolerance  Scap pinning into OH flexion  Cross body MET  Shoulder IR MET    NEUROMUSCULAR RE-EDUCATION ACTIVITIES to improve Balance, Coordination, Kinesthetic, Sense, Proprioception, and Posture for 34 minutes.  The following were included:     Supine IR GTB 3 x 8, verbal/tactile cueing to avoid ant shear  Prone T 3 x 8 x 5", emphasis on scap retraction/depression  Prone LT OH ER 1# 2 x 8, emphasis on scap depression  Standing Y against wall 2 x 8, emphasis on scap depression     NP Today:  Prone high row to ER;0# 3 x 10   Wall slide YTB 3 x 10  Resisted ER @neutral -> 90deg flx -> 90/90 ER GTB 3 x 10  ER 0-90 deg GTB; 3 x burn    THERAPEUTIC ACTIVITIES to improve dynamic and functional performance for 08 minutes including:    UBE lvl 8 " x 4'/4' for UE/cardiovascular endurance    NP:  Standing scaption in front of mirror 4# x 40hold  Standing scaption in front of mirror full range for functional reach training 20x  Circuit: 3 rounds   EOT bent over row 10# x 15   carry -> OH press 5# x 1 turf lap  1/2 kneeling SA landmines 15#bar 3 x 10  Handcuff to OH; GTB 2 x burn  CC SA forward punch 7# 3 x 8-12      Patient Education and Home Exercises     Education provided:   - continue HEP    Written Home Exercises Provided: Patient instructed to cont prior HEP. Exercises were reviewed and Skye was able to demonstrate them prior to the end of the session.  Skye demonstrated good understanding of the education provided. See EMR under Patient Instructions for exercises provided during therapy sessions    ASSESSMENT     Skye did well today. She was very challenged by neuro re-ed exercises with cueing required as listed above; max fatigue during supine IR. Pt has difficulty avoiding UT dominance during MT/LT exercises. Ongoing posterior capsular restrictions with limited IR both behind the back and 90/90. Overall she is progressing very well at this time. No adverse effects reported p tx.     Skye is progressing well towards her goals.   Pt prognosis is Good.     Pt will continue to benefit from skilled outpatient physical therapy to address the deficits listed in the problem list box on initial evaluation, provide pt/family education and to maximize pt's level of independence in the home and community environment.     Pt's spiritual, cultural and educational needs considered and pt agreeable to plan of care and goals.     Anticipated barriers to physical therapy: none    Goals:   Short Term Goals: 6 weeks  1. Pt will be compliant with HEP 50% of prescribed amount. MET  2. The pt to demo improvement in R shoulder PROM to by 80% of the L (MET  3.  The pt to demo tolerance to being out of the sling for 24 hours with pain <2/10 MET     Long Term Goals:  24 weeks   Pt will be compliant with % of prescribed amount.  MET  Pt will score 20% improvement on FOTO Shoulder Mobility  MET  The pt to improvement in AROM of R shoulder within 80% of L shoulder to improve tolerance to OH movement  MET  The pt to  Demo at least 4+/5 of RTC muscle testing to demo improvement in tolerance to activity (Progressing, not met)  The pt to tolerate lifting 10# overhead to improve tolerance to ADLs and work related activities  (Progressing, not met)  The pt will report full participation in ADLs and IADLs without restrictions related to R Shoulder.   (Progressing, not met)    PLAN     Focus on full shoulder mobility, motor training in new range. Gross strengthening to sub scap, trapezius, serratus anterior.    Viridiana Haney, PTA

## 2025-04-25 ENCOUNTER — CLINICAL SUPPORT (OUTPATIENT)
Dept: REHABILITATION | Facility: HOSPITAL | Age: 50
End: 2025-04-25
Payer: COMMERCIAL

## 2025-04-25 DIAGNOSIS — R29.898 UPPER EXTREMITY WEAKNESS: ICD-10-CM

## 2025-04-25 DIAGNOSIS — M25.611 DECREASED RANGE OF MOTION OF RIGHT SHOULDER: ICD-10-CM

## 2025-04-25 DIAGNOSIS — R29.3 POSTURE ABNORMALITY: Primary | ICD-10-CM

## 2025-04-25 PROCEDURE — 97530 THERAPEUTIC ACTIVITIES: CPT | Performed by: PHYSICAL THERAPIST

## 2025-04-25 PROCEDURE — 97112 NEUROMUSCULAR REEDUCATION: CPT | Performed by: PHYSICAL THERAPIST

## 2025-04-25 PROCEDURE — 97110 THERAPEUTIC EXERCISES: CPT | Performed by: PHYSICAL THERAPIST

## 2025-04-25 PROCEDURE — 97140 MANUAL THERAPY 1/> REGIONS: CPT | Performed by: PHYSICAL THERAPIST

## 2025-04-25 NOTE — PROGRESS NOTES
[facility]   Physical Therapy Treatment Note     Name: Skye Marte  Clinic Number: [id number]    Therapy Diagnosis:   No diagnosis found.      Physician: Isidro Paredes MD    Visit Date: 4/25/2025  Physician Orders: PT Eval and Treat  Medical Diagnosis from Referral:   M19.011 (ICD-10-CM) - Arthritis of right acromioclavicular joint   M75.21 (ICD-10-CM) - Biceps tendinitis of right upper extremity   M75.101 (ICD-10-CM) - Tear of right rotator cuff, unspecified tear extent, unspecified whether traumatic   Evaluation Date: 9/3/2024  Authorization Period Expiration: 12/31/2024  Plan of Care Expiration: 02/21/2025 to 04/28/205  Progress Note Due: 03/21/2025  Visit # / Visits authorized: 19/20  FOTO 3/3 and discharged     Time In: 0659  Time Out: 0805  Total Billable Time: 60 minutes     Procedure by Reggie: 08/28/2024  1. Right shoulder arthroscopic rotator cuff repair.   2. Right shoulder open subpectoral biceps tenodesis  3. Right shoulder arthroscopic distal clavicle excision  4. Right shoulder arthroscopic subacromial decompression.   5. Right shoulder arthroscopic debridement labrum  Post-Op Precautions: Follow medium size rotator cuff repair      Precautions: Standard and post-op    SUBJECTIVE     Pt reports: No pain unless she is specifically stretching her shoulder.    She was compliant with home exercise program.  Response to previous treatment: no adverse effects  Functional change: improved sling management    Pain: 0/10 during AROM flexion  Location: right shoulder      OBJECTIVE     DOS: 8/28/24  POD: 7 months, 19 days as of 4/16    Objective Measures updated at progress report unless specified.     Shoulder Passive Range of Motion:     Right Left   Flexion    160 deg 180 deg   ER at 0    80 deg  85 deg   ER at 90    90 deg  100+ deg   IR at 90    30 deg 48 deg     Active Range of Motion:   Shoulder Right Left   Flexion 175 175   Abduction 170 170   Functional ER  "C7 C7   Functional IR L3 T10     Upper Extremity Strength:    (R) UE (L) UE   Shoulder flexion: 5/5 5/5   Shoulder Abduction: 5/5 5/5   Shoulder ER 4+/5 5/5   Shoulder IR 4-/5 5/5   Lower Trap 4-/5 4/5   Middle Trap 5/5 4/5   Serratus anterior 4-/5 4+/5   Rhomboids 5/5 5/5      Insufficient scapular posterior tilt with shoulder flexion and ER. Early and excessive scapular abduction.     Poor posterior capsule mobility. Length deficits in the posterior cuff, lat, and pec minor muscles.    Treatment     Skye received the treatments listed below:      THERAPEUTIC EXERCISES to develop strength, endurance, ROM, flexibility, posture, and core stabilization for 10 minutes including:    Flexion walkaway stretch 10 x 10"   Lat stretch behind mat 10 x 10"   Sleeper stretch 5 x 30"     MANUAL THERAPY TECHNIQUES including Joint mobilizations and Soft tissue Mobilization were applied to R shoulder for 08 minutes.    Assessment of shldr ROM, joint mobility, and strength as above  Inferior, posterior GH mobs (gr III-IV)  PROM in all planes to tolerance  Scap pinning into OH flexion  Cross body MET  Shoulder IR MET    NEUROMUSCULAR RE-EDUCATION ACTIVITIES to improve Balance, Coordination, Kinesthetic, Sense, Proprioception, and Posture for 34 minutes.  The following were included:     Supine IR Black TB 3 x 8, verbal/tactile cueing to avoid ant shear  Prone T 3 x 8 x 5", emphasis on scap retraction/depression  Prone Y with manual cues  Standing Y against wall 2 x 8, emphasis on scap depression     NP Today:  Prone high row to ER;0# 3 x 10   Wall slide YTB 3 x 10  Resisted ER @neutral -> 90deg flx -> 90/90 ER GTB 3 x 10  ER 0-90 deg GTB; 3 x burn    THERAPEUTIC ACTIVITIES to improve dynamic and functional performance for 08 minutes including:    Standing wall slide to posterior tilt with cues for anterior reach    NP:  Standing scaption in front of mirror 4# x 40hold  Standing scaption in front of mirror full range for functional " reach training 20x  Circuit: 3 rounds   EOT bent over row 10# x 15   carry -> OH press 5# x 1 turf lap  1/2 kneeling SA landmines 15#bar 3 x 10  Handcuff to OH; GTB 2 x burn  CC SA forward punch 7# 3 x 8-12      Patient Education and Home Exercises     Education provided:   - continue HEP    Written Home Exercises Provided: Patient instructed to cont prior HEP. Exercises were reviewed and Skye was able to demonstrate them prior to the end of the session.  Skye demonstrated good understanding of the education provided. See EMR under Patient Instructions for exercises provided during therapy sessions    ASSESSMENT     Shows progression with ROM and periscapular control. Still some upward rotation deficits both passively and actively. Can address with mobilization and self exercise. Has 1 more visit authorized and POC is expiring. Discuss possible transition to HEP with patient.    Skye is progressing well towards her goals.   Pt prognosis is Good.     Pt will continue to benefit from skilled outpatient physical therapy to address the deficits listed in the problem list box on initial evaluation, provide pt/family education and to maximize pt's level of independence in the home and community environment.     Pt's spiritual, cultural and educational needs considered and pt agreeable to plan of care and goals.     Anticipated barriers to physical therapy: none    Goals:   Short Term Goals: 6 weeks  1. Pt will be compliant with HEP 50% of prescribed amount. MET  2. The pt to demo improvement in R shoulder PROM to by 80% of the L (MET  3.  The pt to demo tolerance to being out of the sling for 24 hours with pain <2/10 MET     Long Term Goals: 24 weeks   Pt will be compliant with % of prescribed amount.  MET  Pt will score 20% improvement on FOTO Shoulder Mobility  MET  The pt to improvement in AROM of R shoulder within 80% of L shoulder to improve tolerance to OH movement  MET  The pt to  Demo at least  4+/5 of RTC muscle testing to demo improvement in tolerance to activity (Progressing, not met)  The pt to tolerate lifting 10# overhead to improve tolerance to ADLs and work related activities  (Progressing, not met)  The pt will report full participation in ADLs and IADLs without restrictions related to R Shoulder.   (Progressing, not met)    PLAN     Focus on full shoulder mobility, motor training in new range. Gross strengthening to sub scap, trapezius, serratus anterior.    Clive Null, PT, DPT

## 2025-04-29 ENCOUNTER — PATIENT MESSAGE (OUTPATIENT)
Dept: REHABILITATION | Facility: HOSPITAL | Age: 50
End: 2025-04-29
Payer: COMMERCIAL

## 2025-05-08 ENCOUNTER — CLINICAL SUPPORT (OUTPATIENT)
Dept: REHABILITATION | Facility: HOSPITAL | Age: 50
End: 2025-05-08
Payer: COMMERCIAL

## 2025-05-08 DIAGNOSIS — M25.611 DECREASED RANGE OF MOTION OF RIGHT SHOULDER: ICD-10-CM

## 2025-05-08 DIAGNOSIS — R29.3 POSTURE ABNORMALITY: Primary | ICD-10-CM

## 2025-05-08 DIAGNOSIS — R29.898 UPPER EXTREMITY WEAKNESS: ICD-10-CM

## 2025-05-08 PROCEDURE — 97110 THERAPEUTIC EXERCISES: CPT | Mod: CQ

## 2025-05-08 PROCEDURE — 97140 MANUAL THERAPY 1/> REGIONS: CPT | Mod: CQ

## 2025-05-08 PROCEDURE — 97112 NEUROMUSCULAR REEDUCATION: CPT | Mod: CQ

## 2025-05-08 PROCEDURE — 97530 THERAPEUTIC ACTIVITIES: CPT | Mod: CQ

## 2025-05-08 NOTE — PROGRESS NOTES
[facility]   Physical Therapy Treatment Note     Name: Skye Maret  [facility] Number: [id number]    Therapy Diagnosis:   Encounter Diagnoses   Name Primary?    Posture abnormality Yes    Decreased range of motion of right shoulder     Upper extremity weakness      Physician: Isidro Paredes MD    Visit Date: 5/8/2025  Physician Orders: PT Eval and Treat  Medical Diagnosis from Referral:   M19.011 (ICD-10-CM) - Arthritis of right acromioclavicular joint   M75.21 (ICD-10-CM) - Biceps tendinitis of right upper extremity   M75.101 (ICD-10-CM) - Tear of right rotator cuff, unspecified tear extent, unspecified whether traumatic   Evaluation Date: 9/3/2024  Authorization Period Expiration: 12/31/2024  Plan of Care Expiration: 02/21/2025 to 04/28/205  Progress Note Due: 03/21/2025  Visit # / Visits authorized: 20/30  FOTO 3/3 and discharged     Time In: 1703  Time Out: 1805  Total Billable Time: 56 minutes     Procedure by Reggie: 08/28/2024  1. Right shoulder arthroscopic rotator cuff repair.   2. Right shoulder open subpectoral biceps tenodesis  3. Right shoulder arthroscopic distal clavicle excision  4. Right shoulder arthroscopic subacromial decompression.   5. Right shoulder arthroscopic debridement labrum  Post-Op Precautions: Follow medium size rotator cuff repair      Precautions: Standard and post-op    SUBJECTIVE     Pt reports: no new complaints. She's been slacking lately with her HEP.     She was compliant with home exercise program.  Response to previous treatment: no adverse effects  Functional change: improved sling management    Pain: 0/10 during AROM flexion  Location: right shoulder      OBJECTIVE     DOS: 8/28/24  POD: 8 months, 10 days as of 5/8    Objective Measures updated at progress report unless specified.     Shoulder Passive Range of Motion:     Right Left   Flexion    160 deg 180 deg   ER at 0    80 deg  85 deg   ER at 90    90 deg  100+ deg   IR at 90     "30 deg 48 deg     Active Range of Motion:   Shoulder Right Left   Flexion 175 175   Abduction 170 170   Functional ER C7 C7   Functional IR L3 T10     Upper Extremity Strength:    (R) UE (L) UE   Shoulder flexion: 5/5 5/5   Shoulder Abduction: 5/5 5/5   Shoulder ER 4+/5 5/5   Shoulder IR 4-/5 5/5   Lower Trap 4-/5 4/5   Middle Trap 5/5 4/5   Serratus anterior 4-/5 4+/5   Rhomboids 5/5 5/5      Insufficient scapular posterior tilt with shoulder flexion and ER. Early and excessive scapular abduction.     Poor posterior capsule mobility. Length deficits in the posterior cuff, lat, and pec minor muscles.    Treatment     Skye received the treatments listed below:      THERAPEUTIC EXERCISES to develop strength, endurance, ROM, flexibility, posture, and core stabilization for 14 minutes including:    Lat stretch behind mat 10 x 10"   Supine AA dowel flexion to tolerance x 30  Sleeper stretch 5 x 30"     MANUAL THERAPY TECHNIQUES including Joint mobilizations and Soft tissue Mobilization were applied to R shoulder for 08 minutes.    Assessment of shldr ROM, joint mobility, and strength as above  Inferior, posterior GH mobs (gr III-IV)  PROM in all planes to tolerance  Scap pinning into OH flexion  Cross body MET  Shoulder IR MET    NEUROMUSCULAR RE-EDUCATION ACTIVITIES to improve Balance, Coordination, Kinesthetic, Sense, Proprioception, and Posture for 20 minutes.  The following were included:     Supine IR Black TB 3 x 8, verbal/tactile cueing to avoid ant shear  Prone T 1# 3 x 8   Prone Y 1# 3 x 8  Prone swimmers 3 x 6    NP Today:  Prone high row to ER;0# 3 x 10   Wall slide YTB 3 x 10  Resisted ER @neutral -> 90deg flx -> 90/90 ER GTB 3 x 10  ER 0-90 deg GTB; 3 x burn  Standing Y against wall 2 x 8, emphasis on scap depression     THERAPEUTIC ACTIVITIES to improve dynamic and functional performance for 14 minutes including:    UBE lvl 7 x 4'/4' for UE/cardiovascular endurance  Standing scaption in front of mirror 4# " x 40hold    NP:  Standing wall slide to posterior tilt with cues for anterior reach  Standing scaption in front of mirror full range for functional reach training 20x  Circuit: 3 rounds   EOT bent over row 10# x 15   carry -> OH press 5# x 1 turf lap  1/2 kneeling SA landmines 15#bar 3 x 10  Handcuff to OH; GTB 2 x burn  CC SA forward punch 7# 3 x 8-12      Patient Education and Home Exercises     Education provided:   - continue HEP    Written Home Exercises Provided: Patient instructed to cont prior HEP. Exercises were reviewed and Skye was able to demonstrate them prior to the end of the session.  Skye demonstrated good understanding of the education provided. See EMR under Patient Instructions for exercises provided during therapy sessions    ASSESSMENT     Skye did well today. She is showing improvement in FIR with time. Pt was challenged by progressed prone exercises with rest breaks required. Pt requested more PT before transitioning to formal HEP. No adverse effects reported p tx.     Skye is progressing well towards her goals.   Pt prognosis is Good.     Pt will continue to benefit from skilled outpatient physical therapy to address the deficits listed in the problem list box on initial evaluation, provide pt/family education and to maximize pt's level of independence in the home and community environment.     Pt's spiritual, cultural and educational needs considered and pt agreeable to plan of care and goals.     Anticipated barriers to physical therapy: none    Goals:   Short Term Goals: 6 weeks  1. Pt will be compliant with HEP 50% of prescribed amount. MET  2. The pt to demo improvement in R shoulder PROM to by 80% of the L (MET  3.  The pt to demo tolerance to being out of the sling for 24 hours with pain <2/10 MET     Long Term Goals: 24 weeks   Pt will be compliant with % of prescribed amount.  MET  Pt will score 20% improvement on FOTO Shoulder Mobility  MET  The pt to  improvement in AROM of R shoulder within 80% of L shoulder to improve tolerance to OH movement  MET  The pt to  Demo at least 4+/5 of RTC muscle testing to demo improvement in tolerance to activity (Progressing, not met)  The pt to tolerate lifting 10# overhead to improve tolerance to ADLs and work related activities  (Progressing, not met)  The pt will report full participation in ADLs and IADLs without restrictions related to R Shoulder.   (Progressing, not met)    PLAN     Focus on full shoulder mobility, motor training in new range. Gross strengthening to sub scap, trapezius, serratus anterior.    Viridiana Haney, PTA

## 2025-05-16 ENCOUNTER — OFFICE VISIT (OUTPATIENT)
Dept: INFECTIOUS DISEASES | Facility: CLINIC | Age: 50
End: 2025-05-16

## 2025-05-16 VITALS
TEMPERATURE: 99 F | BODY MASS INDEX: 22.8 KG/M2 | HEIGHT: 62 IN | SYSTOLIC BLOOD PRESSURE: 104 MMHG | WEIGHT: 123.88 LBS | DIASTOLIC BLOOD PRESSURE: 68 MMHG | HEART RATE: 72 BPM

## 2025-05-16 DIAGNOSIS — Z71.85 VACCINE COUNSELING: ICD-10-CM

## 2025-05-16 DIAGNOSIS — Z71.84 COUNSELING ABOUT TRAVEL: ICD-10-CM

## 2025-05-16 DIAGNOSIS — Z29.89 NEED FOR MALARIA PROPHYLAXIS: Primary | ICD-10-CM

## 2025-05-16 PROCEDURE — 99999 PR PBB SHADOW E&M-EST. PATIENT-LVL V: CPT | Mod: PBBFAC,,, | Performed by: STUDENT IN AN ORGANIZED HEALTH CARE EDUCATION/TRAINING PROGRAM

## 2025-05-16 RX ORDER — ATOVAQUONE AND PROGUANIL HYDROCHLORIDE 250; 100 MG/1; MG/1
1 TABLET, FILM COATED ORAL DAILY
Qty: 21 TABLET | Refills: 0 | Status: SHIPPED | OUTPATIENT
Start: 2025-07-02 | End: 2025-07-23

## 2025-05-16 RX ORDER — AZITHROMYCIN 500 MG/1
1000 TABLET, FILM COATED ORAL ONCE AS NEEDED
Qty: 2 TABLET | Refills: 0 | Status: SHIPPED | OUTPATIENT
Start: 2025-05-16 | End: 2025-05-16

## 2025-05-16 NOTE — PATIENT INSTRUCTIONS
Malaria prophylaxis:  - Malarone, take one pill daily  Start 2 days prior to entering affected area, continue daily during travel, end 7 days after leaving affected area  -purchase insect repellent containing DEET (25-35%; higher strengths last longer, but >35% will damage synthetic materials) and apply premethrin spray on clothing according to repellent label instructions.    Traveler's diarrhea  -buy hand  and use prior to eating and after using the bathroom  - bring imodium with you (available over the counter. If diarrhea severe, lasting longer than 4 days or blood in stool, take the antibiotic as we discussed. [ Azithromycin 1000 mg, take 2, 500mg pills once if needed for severe diarrhea]      To view your immunizations given in [place], register for an account at: [email or web address]    Enroll in Smart Traveler Enrollment Program: https://step.state.gov/step/

## 2025-05-16 NOTE — PROGRESS NOTES
INFECTIOUS DISEASE TRAVEL CLINIC  05/16/2025     Subjective:      Chief Complaint:   Chief Complaint   Patient presents with    Travel Consult       History of Present Illness    Skye Marte is a 49 y.o. female  who presents today for routine pretravel consultation.  The patient will be traveling to [place] and [facility] on 7/4-7/16.   The patient will be lodging at a resorts/hotels.  The patient has travelled to the following other countries in the past; [place], [place].  The patient reports that they did receive all their childhood vaccinations. The purpose of this trip is vacation. Pt reports that she got her second dose of twinrix and MMR on 5/15 at [facility]. She had lab work recently that showed she was not immune to MMR. She will be going to [facility] near [facility] and [facility].     The patient reports the following medication allergies; none.  The patient reports the following food allergies; none .       Review of patient's allergies indicates:  No Known Allergies      Objective:   VS (24h):   Vitals:    05/16/25 0854   BP: 104/68   Pulse: 72   Temp: 98.8 °F (37.1 °C)       General: Afebrile, alert, comfortable, no acute distress.   Pulmonary: Non labored  Skin: No jaundice, rashes, or visible lesions.   Neurological:  Alert and oriented x 4.       There is no immunization history for the selected administration types on file for this patient.    Assessment:     Pre-Travel clinic assessment  Vaccine counseling    1. Need for malaria prophylaxis  atovaquone-proguaniL (MALARONE) 250-100 mg Tab      2. Counseling about travel  azithromycin (ZITHROMAX) 500 MG tablet    poliovirus vaccine 40-8-32 unit/0.5 mL suspension 0.5 mL      3. Vaccine counseling  poliovirus vaccine 40-8-32 unit/0.5 mL suspension 0.5 mL          Plan:     The patient was provided with an extensive travel guidance packet which provides travel information specific to the patients  itinerary.     The patient was counseled on:  - Dietary precautions.  - Personal protective measures to prevent insect-borne diseases (e.g., malaria, dengue)  - Precautions to prevent exposure to rabies and seek treatment for possible exposures  - Precautions against sun exposure  - Personal and travel safety      Infectious Disease risks:      Mosquito Borne pathogens:  Reviewed basic mosquito avoidance precautions including wearing long sleeve clothing and insect repellant to prevent insect-borne diseases (e.g., malaria, dengue, zika).The patient was also instructed to purchase insect repellent containing DEET (25-35%; higher strengths last longer, but >35% will damage synthetic materials) and apply premethrin spray on clothing according to repellent label instructions.     An anti-malarial agent was malarone prescribed for malaria prophylaxis and possible side effects were reviewed.     Atovaquone-proguanil 250-100 mg PO qdaily, start 2 days before expected exposure and end 7 days after last day of exposure.    Food Borne pathogens:  Reviewed basic hand, food and water sanitation precautions.  Patient instructed to take hand  on their trip. The patient was instructed to purchase Imodium over the counter to take in case diarrhea (without blood or fever) develops.  An antibiotic was ordered for treatment if severe or bloody diarrhea develops and the patient was instructed on use and possible side effects.       Rabies: Discussed the risk of rabies and precautions to prevent exposure. Patient is aware to seek prompt medical care should they be exposed.       Vaccinations:  The patient's immunization history was reviewed and, based on the patient's itinerary, the following immunizations were ordered:   Advised pt to get typhoid IM she states she will get this at [facility]  Polio schedule for next week      The patient was encouraged to contact us about any problems that may develop after immunization and  possible side effects were reviewed. The patient was instructed to contact us if problems develop after travel.        30 minutes of total time spent on the encounter, which includes face to face time and non-face to face time preparing to see the patient (eg, review of tests), obtaining and/or reviewing separately obtained history, documenting clinical information in the electronic or other health record, independently interpreting results (not separately reported) and communicating results to the patient/family/caregiver, or care coordination (not separately reported).       Jessika Ruiz MD  Infectious Disease

## 2025-05-23 ENCOUNTER — CLINICAL SUPPORT (OUTPATIENT)
Dept: REHABILITATION | Facility: HOSPITAL | Age: 50
End: 2025-05-23
Payer: COMMERCIAL

## 2025-05-23 DIAGNOSIS — R29.898 UPPER EXTREMITY WEAKNESS: ICD-10-CM

## 2025-05-23 DIAGNOSIS — M25.611 DECREASED RANGE OF MOTION OF RIGHT SHOULDER: ICD-10-CM

## 2025-05-23 DIAGNOSIS — R29.3 POSTURE ABNORMALITY: Primary | ICD-10-CM

## 2025-05-23 PROCEDURE — 97140 MANUAL THERAPY 1/> REGIONS: CPT | Performed by: PHYSICAL THERAPIST

## 2025-05-23 PROCEDURE — 97530 THERAPEUTIC ACTIVITIES: CPT | Performed by: PHYSICAL THERAPIST

## 2025-05-23 PROCEDURE — 97112 NEUROMUSCULAR REEDUCATION: CPT | Performed by: PHYSICAL THERAPIST

## 2025-05-23 PROCEDURE — 97110 THERAPEUTIC EXERCISES: CPT | Performed by: PHYSICAL THERAPIST

## 2025-05-23 NOTE — PROGRESS NOTES
[facility]   Physical Therapy Treatment Note     Name: Skye Marte  Clinic Number: [id number]    Therapy Diagnosis:   No diagnosis found.    Physician: Isidro Paredes MD    Visit Date: 5/23/2025  Physician Orders: PT Eval and Treat  Medical Diagnosis from Referral:   M19.011 (ICD-10-CM) - Arthritis of right acromioclavicular joint   M75.21 (ICD-10-CM) - Biceps tendinitis of right upper extremity   M75.101 (ICD-10-CM) - Tear of right rotator cuff, unspecified tear extent, unspecified whether traumatic   Evaluation Date: 9/3/2024  Authorization Period Expiration: 12/31/2024  Plan of Care Expiration: 02/21/2025 to 04/28/205  Progress Note Due: 03/21/2025  Visit # / Visits authorized: 20/30  FOTO 3/3 and discharged     Time In: 0702  Time Out: 0805  Total Billable Time: 38 minutes     Procedure by Reggie: 08/28/2024  1. Right shoulder arthroscopic rotator cuff repair.   2. Right shoulder open subpectoral biceps tenodesis  3. Right shoulder arthroscopic distal clavicle excision  4. Right shoulder arthroscopic subacromial decompression.   5. Right shoulder arthroscopic debridement labrum  Post-Op Precautions: Follow medium size rotator cuff repair      Precautions: Standard and post-op    SUBJECTIVE     Pt reports: Only completed HEP a few times the past few weeks.    She was compliant with home exercise program.  Response to previous treatment: no adverse effects  Functional change: improved sling management    Pain: 0/10 during AROM flexion  Location: right shoulder      OBJECTIVE     DOS: 8/28/24  POD: 8 months, 10 days as of 5/8    Objective Measures updated at progress report unless specified.     Shoulder Passive Range of Motion:     Right Left   Flexion    160 deg 180 deg   ER at 0    80 deg  85 deg   ER at 90    90 deg  100+ deg   IR at 90    30 deg 48 deg     Active Range of Motion:   Shoulder Right Left   Flexion 175 175   Abduction 170 170   Functional ER C7 C7  "  Functional IR L3 T10     Upper Extremity Strength:    (R) UE (L) UE   Shoulder flexion: 5/5 5/5   Shoulder Abduction: 5/5 5/5   Shoulder ER 4+/5 5/5   Shoulder IR 4-/5 5/5   Lower Trap 4-/5 4/5   Middle Trap 5/5 4/5   Serratus anterior 4-/5 4+/5   Rhomboids 5/5 5/5      Insufficient scapular posterior tilt with shoulder flexion and ER. Early and excessive scapular abduction.     Poor posterior capsule mobility. Length deficits in the posterior cuff, lat, and pec minor muscles.    Treatment     Skye received the treatments listed below:      THERAPEUTIC EXERCISES to develop strength, endurance, ROM, flexibility, posture, and core stabilization for 8 minutes including:    Sleeper stretch 5 x 30"     MANUAL THERAPY TECHNIQUES including Joint mobilizations and Soft tissue Mobilization were applied to R shoulder for 08 minutes.    Assessment of shldr ROM, joint mobility, and strength as above  Inferior, posterior GH mobs (gr III-IV)  PROM in all planes to tolerance  Scap pinning into OH flexion  Cross body MET  Shoulder IR MET    NEUROMUSCULAR RE-EDUCATION ACTIVITIES to improve Balance, Coordination, Kinesthetic, Sense, Proprioception, and Posture for 8 minutes.  The following were included:   Standing touch down with YTB 3 x 10      NP  Supine IR Black TB 3 x 8, verbal/tactile cueing to avoid ant shear  Prone T 1# 3 x 8   Prone Y 1# 3 x 8  Prone swimmers 3 x 6    NP Today:  Prone high row to ER;0# 3 x 10   Wall slide YTB 3 x 10  Resisted ER @neutral -> 90deg flx -> 90/90 ER GTB 3 x 10  ER 0-90 deg GTB; 3 x burn  Standing Y against wall 2 x 8, emphasis on scap depression     THERAPEUTIC ACTIVITIES to improve dynamic and functional performance for 12 minutes including:    Downward dog for SA activation 4 x 6  Bear crawls 4 x 6    NP:  Standing wall slide to posterior tilt with cues for anterior reach  Standing scaption in front of mirror full range for functional reach training 20x  Circuit: 3 rounds   EOT bent over " row 10# x 15   carry -> OH press 5# x 1 turf lap  1/2 kneeling SA landmines 15#bar 3 x 10  Handcuff to OH; GTB 2 x burn  CC SA forward punch 7# 3 x 8-12      Patient Education and Home Exercises     Education provided:   - continue HEP    Written Home Exercises Provided: Patient instructed to cont prior HEP. Exercises were reviewed and Skye was able to demonstrate them prior to the end of the session.  Skye demonstrated good understanding of the education provided. See EMR under Patient Instructions for exercises provided during therapy sessions    ASSESSMENT     Main limitation was SA strength. Updated HEP for exercises to complete this without additional equipment. Requires cuing for correct technique. Hopefully will see better compliance with HEP.     Skye is progressing well towards her goals.   Pt prognosis is Good.     Pt will continue to benefit from skilled outpatient physical therapy to address the deficits listed in the problem list box on initial evaluation, provide pt/family education and to maximize pt's level of independence in the home and community environment.     Pt's spiritual, cultural and educational needs considered and pt agreeable to plan of care and goals.     Anticipated barriers to physical therapy: none    Goals:   Short Term Goals: 6 weeks  1. Pt will be compliant with HEP 50% of prescribed amount. MET  2. The pt to demo improvement in R shoulder PROM to by 80% of the L (MET  3.  The pt to demo tolerance to being out of the sling for 24 hours with pain <2/10 MET     Long Term Goals: 24 weeks   Pt will be compliant with % of prescribed amount.  MET  Pt will score 20% improvement on FOTO Shoulder Mobility  MET  The pt to improvement in AROM of R shoulder within 80% of L shoulder to improve tolerance to OH movement  MET  The pt to  Demo at least 4+/5 of RTC muscle testing to demo improvement in tolerance to activity (Progressing, not met)  The pt to tolerate lifting 10#  overhead to improve tolerance to ADLs and work related activities  (Progressing, not met)  The pt will report full participation in ADLs and IADLs without restrictions related to R Shoulder.   (Progressing, not met)    PLAN     Focus on full shoulder mobility, motor training in new range. Gross strengthening to sub scap, trapezius, serratus anterior.    Clive Null, PT, DPT

## 2025-06-06 ENCOUNTER — CLINICAL SUPPORT (OUTPATIENT)
Dept: REHABILITATION | Facility: HOSPITAL | Age: 50
End: 2025-06-06
Payer: COMMERCIAL

## 2025-06-06 DIAGNOSIS — R29.3 POSTURE ABNORMALITY: Primary | ICD-10-CM

## 2025-06-06 DIAGNOSIS — R29.898 UPPER EXTREMITY WEAKNESS: ICD-10-CM

## 2025-06-06 DIAGNOSIS — M25.611 DECREASED RANGE OF MOTION OF RIGHT SHOULDER: ICD-10-CM

## 2025-06-06 PROCEDURE — 97140 MANUAL THERAPY 1/> REGIONS: CPT | Performed by: PHYSICAL THERAPIST

## 2025-06-06 PROCEDURE — 97530 THERAPEUTIC ACTIVITIES: CPT | Performed by: PHYSICAL THERAPIST

## 2025-06-06 PROCEDURE — 97112 NEUROMUSCULAR REEDUCATION: CPT | Performed by: PHYSICAL THERAPIST

## 2025-06-06 PROCEDURE — 97110 THERAPEUTIC EXERCISES: CPT | Performed by: PHYSICAL THERAPIST

## 2025-06-08 NOTE — PROGRESS NOTES
[facility]   Physical Therapy Treatment Note     Name: Skye Marte  Clinic Number: [id number]    Therapy Diagnosis:   No diagnosis found.    Physician: Isidro Paredes MD    Visit Date: 6/6/2025  Physician Orders: PT Eval and Treat  Medical Diagnosis from Referral:   M19.011 (ICD-10-CM) - Arthritis of right acromioclavicular joint   M75.21 (ICD-10-CM) - Biceps tendinitis of right upper extremity   M75.101 (ICD-10-CM) - Tear of right rotator cuff, unspecified tear extent, unspecified whether traumatic   Evaluation Date: 9/3/2024  Authorization Period Expiration: 12/31/2024  Plan of Care Expiration: 02/21/2025 to 04/28/205  Progress Note Due: 03/21/2025  Visit # / Visits authorized: 20/30  FOTO 3/3 and discharged     Time In: 0707  Time Out: 55581  Total Billable Time: 38 minutes     Procedure by Reggie: 08/28/2024  1. Right shoulder arthroscopic rotator cuff repair.   2. Right shoulder open subpectoral biceps tenodesis  3. Right shoulder arthroscopic distal clavicle excision  4. Right shoulder arthroscopic subacromial decompression.   5. Right shoulder arthroscopic debridement labrum  Post-Op Precautions: Follow medium size rotator cuff repair      Precautions: Standard and post-op    SUBJECTIVE     Pt reports: HEP has been progressing well. Minimal issues with ADLs. Believes that she is ready to transition to an HEP based plan.     She was compliant with home exercise program.  Response to previous treatment: no adverse effects  Functional change: improved sling management    Pain: 0/10 during AROM flexion  Location: right shoulder      OBJECTIVE     DOS: 8/28/24  POD: 8 months, 10 days as of 5/8    Objective Measures updated at progress report unless specified.     Shoulder Passive Range of Motion:     Right Left   Flexion    160 deg 180 deg   ER at 0    80 deg  85 deg   ER at 90    90 deg  100+ deg   IR at 90    30 deg 48 deg     Active Range of Motion:   Shoulder Right  "Left   Flexion 175 175   Abduction 170 170   Functional ER C7 C7   Functional IR L3 T10     Upper Extremity Strength:    (R) UE (L) UE   Shoulder flexion: 5/5 5/5   Shoulder Abduction: 5/5 5/5   Shoulder ER 4+/5 5/5   Shoulder IR 4-/5 5/5   Lower Trap 4-/5 4/5   Middle Trap 5/5 4/5   Serratus anterior 4-/5 4+/5   Rhomboids 5/5 5/5      Insufficient scapular posterior tilt with shoulder flexion and ER. Early and excessive scapular abduction.     Poor posterior capsule mobility. Length deficits in the posterior cuff, lat, and pec minor muscles.    Treatment     Skye received the treatments listed below:      THERAPEUTIC EXERCISES to develop strength, endurance, ROM, flexibility, posture, and core stabilization for 8 minutes including:    Sleeper stretch 5 x 30"     MANUAL THERAPY TECHNIQUES including Joint mobilizations and Soft tissue Mobilization were applied to R shoulder for 08 minutes.    Assessment of shldr ROM, joint mobility, and strength as above  Inferior, posterior GH mobs (gr III-IV)  PROM in all planes to tolerance  Scap pinning into OH flexion  Cross body MET  Shoulder IR MET    NEUROMUSCULAR RE-EDUCATION ACTIVITIES to improve Balance, Coordination, Kinesthetic, Sense, Proprioception, and Posture for 8 minutes.  The following were included:   Standing touch down with YTB 3 x 10  IR on wall 3 x 10      NP  Supine IR Black TB 3 x 8, verbal/tactile cueing to avoid ant shear  Prone T 1# 3 x 8   Prone Y 1# 3 x 8  Prone swimmers 3 x 6    NP Today:  Prone high row to ER;0# 3 x 10   Wall slide YTB 3 x 10  Resisted ER @neutral -> 90deg flx -> 90/90 ER GTB 3 x 10  ER 0-90 deg GTB; 3 x burn  Standing Y against wall 2 x 8, emphasis on scap depression     THERAPEUTIC ACTIVITIES to improve dynamic and functional performance for 12 minutes including:    Downward dog for SA activation 4 x 6  Bear crawls 4 x 6  Reverse Bear Crawls    NP:  Standing wall slide to posterior tilt with cues for anterior reach  Standing " scaption in front of mirror full range for functional reach training 20x  Circuit: 3 rounds   EOT bent over row 10# x 15   carry -> OH press 5# x 1 turf lap  1/2 kneeling SA landmines 15#bar 3 x 10  Handcuff to OH; GTB 2 x burn  CC SA forward punch 7# 3 x 8-12      Patient Education and Home Exercises     Education provided:   - continue HEP    Written Home Exercises Provided: Patient instructed to cont prior HEP. Exercises were reviewed and Skye was able to demonstrate them prior to the end of the session.  Skye demonstrated good understanding of the education provided. See EMR under Patient Instructions for exercises provided during therapy sessions    ASSESSMENT     Progressing well. Ready to trail self management with HEP. Will follow up as needed.     Skye is progressing well towards her goals.   Pt prognosis is Good.     Pt will continue to benefit from skilled outpatient physical therapy to address the deficits listed in the problem list box on initial evaluation, provide pt/family education and to maximize pt's level of independence in the home and community environment.     Pt's spiritual, cultural and educational needs considered and pt agreeable to plan of care and goals.     Anticipated barriers to physical therapy: none    Goals:   Short Term Goals: 6 weeks  1. Pt will be compliant with HEP 50% of prescribed amount. MET  2. The pt to demo improvement in R shoulder PROM to by 80% of the L (MET  3.  The pt to demo tolerance to being out of the sling for 24 hours with pain <2/10 MET     Long Term Goals: 24 weeks   Pt will be compliant with % of prescribed amount.  MET  Pt will score 20% improvement on FOTO Shoulder Mobility  MET  The pt to improvement in AROM of R shoulder within 80% of L shoulder to improve tolerance to OH movement  MET  The pt to  Demo at least 4+/5 of RTC muscle testing to demo improvement in tolerance to activity (Progressing, not met)  The pt to tolerate lifting 10#  overhead to improve tolerance to ADLs and work related activities  (met)  The pt will report full participation in ADLs and IADLs without restrictions related to R Shoulder.   (Met)    PLAN     Transition to self management with HEP. Focus on full shoulder mobility, motor training in new range. Gross strengthening to sub scap, trapezius, serratus anterior. Follow up as needed.     Clive Null, PT, DPT

## (undated) DEVICE — UNDERGLOVES BIOGEL PI SIZE 8

## (undated) DEVICE — BNDG COFLEX FOAM LF2 ST 4X5YD

## (undated) DEVICE — GLOVE SENSICARE PI SURG 7

## (undated) DEVICE — Device

## (undated) DEVICE — COVER CAMERA OPERATING ROOM

## (undated) DEVICE — NDL SUTURE FLEXIBLE

## (undated) DEVICE — SUT MCRYL PLUS 4-0 PS2 27IN

## (undated) DEVICE — PAD ELECTRODE STER 1.5X3

## (undated) DEVICE — GLOVE SENSICARE PI GRN 7

## (undated) DEVICE — KIT SHOULDER POSITIONER SPIDER

## (undated) DEVICE — PENCIL SMK EVAC CONNECTOR 10FT

## (undated) DEVICE — SOL NACL IRR 3000ML

## (undated) DEVICE — BURR OVAL CUTTING 6 MM

## (undated) DEVICE — PAD DERMAPROX THCK 11X15X1IN

## (undated) DEVICE — KIT TRIMANO CHIN

## (undated) DEVICE — CLOSURE SKIN STERI STRIP 1/2X4

## (undated) DEVICE — ELECTRODE REM PLYHSV RETURN 9

## (undated) DEVICE — GRASPER SUTURE 45 DEGREE

## (undated) DEVICE — CANNULA DRY DOC 7 X 85

## (undated) DEVICE — DRESSING AQUACEL AG SILVER 4X4

## (undated) DEVICE — SOL IRR SOD CHL .9% POUR

## (undated) DEVICE — SUT VICRYL PLUS 3-0 SH 18IN

## (undated) DEVICE — ADHESIVE MASTISOL VIAL 48/BX

## (undated) DEVICE — SUT SUTURETAPE TIGERLINK 1.3MM

## (undated) DEVICE — ADHESIVE DERMABOND ADVANCED

## (undated) DEVICE — DRAPE STERI U-SHAPED 47X51IN

## (undated) DEVICE — BLADE SHAVER LANZA 4.2X13CM

## (undated) DEVICE — SUT PROLENE 0 MO6 30IN BLUE

## (undated) DEVICE — SUPPORT SLING SHOT II MEDIUM

## (undated) DEVICE — SUT MONOCRYL 4-0 PS-2

## (undated) DEVICE — TUBING SUC UNIV W/CONN 12FT

## (undated) DEVICE — YANKAUER OPEN TIP W/O VENT

## (undated) DEVICE — DRAPE SHOULDER BEACH CHAIR

## (undated) DEVICE — BNDG COFLEX FOAM LF2 ST 6X5YD

## (undated) DEVICE — GLOVE BIOGEL SKINSENSE PI 8.0

## (undated) DEVICE — GOWN ECLIPSE REINF LV4 XLNG XL

## (undated) DEVICE — ELECTRODE 90 DEGREE ANGLE

## (undated) DEVICE — COVER LIGHT HANDLE 80/CA

## (undated) DEVICE — DRAPE STERI-DRAPE 1000 17X11IN